# Patient Record
Sex: FEMALE | Race: WHITE | NOT HISPANIC OR LATINO | Employment: OTHER | URBAN - METROPOLITAN AREA
[De-identification: names, ages, dates, MRNs, and addresses within clinical notes are randomized per-mention and may not be internally consistent; named-entity substitution may affect disease eponyms.]

---

## 2017-01-16 ENCOUNTER — GENERIC CONVERSION - ENCOUNTER (OUTPATIENT)
Dept: OTHER | Facility: OTHER | Age: 62
End: 2017-01-16

## 2017-01-20 ENCOUNTER — ALLSCRIPTS OFFICE VISIT (OUTPATIENT)
Dept: OTHER | Facility: OTHER | Age: 62
End: 2017-01-20

## 2017-01-20 DIAGNOSIS — E78.5 HYPERLIPIDEMIA: ICD-10-CM

## 2017-01-20 DIAGNOSIS — K21.9 GASTRO-ESOPHAGEAL REFLUX DISEASE WITHOUT ESOPHAGITIS: ICD-10-CM

## 2017-01-20 DIAGNOSIS — M15.0 PRIMARY GENERALIZED (OSTEO)ARTHRITIS: ICD-10-CM

## 2017-01-20 DIAGNOSIS — Z51.81 ENCOUNTER FOR THERAPEUTIC DRUG LEVEL MONITORING: ICD-10-CM

## 2017-01-20 DIAGNOSIS — M48.50XA COLLAPSED VERTEBRA, NOT ELSEWHERE CLASSIFIED, SITE UNSPECIFIED, INITIAL ENCOUNTER FOR FRACTURE (HCC): ICD-10-CM

## 2017-01-20 DIAGNOSIS — M81.0 AGE-RELATED OSTEOPOROSIS WITHOUT CURRENT PATHOLOGICAL FRACTURE: ICD-10-CM

## 2017-01-23 ENCOUNTER — GENERIC CONVERSION - ENCOUNTER (OUTPATIENT)
Dept: OTHER | Facility: OTHER | Age: 62
End: 2017-01-23

## 2017-01-26 ENCOUNTER — HOSPITAL ENCOUNTER (OUTPATIENT)
Dept: RADIOLOGY | Facility: MEDICAL CENTER | Age: 62
Discharge: HOME | End: 2017-01-26
Payer: COMMERCIAL

## 2017-01-26 DIAGNOSIS — Z12.31 OTHER SCREENING MAMMOGRAM: ICD-10-CM

## 2017-01-26 PROCEDURE — G0202 SCR MAMMO BI INCL CAD: HCPCS

## 2017-01-27 ENCOUNTER — TRANSCRIBE ORDERS (OUTPATIENT)
Dept: ADMINISTRATIVE | Facility: HOSPITAL | Age: 62
End: 2017-01-27

## 2017-01-27 DIAGNOSIS — Z12.31 SCREENING MAMMOGRAM FOR HIGH-RISK PATIENT: Primary | ICD-10-CM

## 2017-03-06 ENCOUNTER — GENERIC CONVERSION - ENCOUNTER (OUTPATIENT)
Dept: OTHER | Facility: OTHER | Age: 62
End: 2017-03-06

## 2017-03-20 ENCOUNTER — GENERIC CONVERSION - ENCOUNTER (OUTPATIENT)
Dept: OTHER | Facility: OTHER | Age: 62
End: 2017-03-20

## 2017-03-27 ENCOUNTER — GENERIC CONVERSION - ENCOUNTER (OUTPATIENT)
Dept: OTHER | Facility: OTHER | Age: 62
End: 2017-03-27

## 2017-04-03 ENCOUNTER — GENERIC CONVERSION - ENCOUNTER (OUTPATIENT)
Dept: OTHER | Facility: OTHER | Age: 62
End: 2017-04-03

## 2017-05-01 ENCOUNTER — GENERIC CONVERSION - ENCOUNTER (OUTPATIENT)
Dept: OTHER | Facility: OTHER | Age: 62
End: 2017-05-01

## 2017-05-22 ENCOUNTER — GENERIC CONVERSION - ENCOUNTER (OUTPATIENT)
Dept: OTHER | Facility: OTHER | Age: 62
End: 2017-05-22

## 2017-06-12 ENCOUNTER — GENERIC CONVERSION - ENCOUNTER (OUTPATIENT)
Dept: OTHER | Facility: OTHER | Age: 62
End: 2017-06-12

## 2017-07-20 ENCOUNTER — ALLSCRIPTS OFFICE VISIT (OUTPATIENT)
Dept: OTHER | Facility: OTHER | Age: 62
End: 2017-07-20

## 2017-07-20 DIAGNOSIS — M15.0 PRIMARY GENERALIZED (OSTEO)ARTHRITIS: ICD-10-CM

## 2017-07-20 DIAGNOSIS — M81.0 AGE-RELATED OSTEOPOROSIS WITHOUT CURRENT PATHOLOGICAL FRACTURE: ICD-10-CM

## 2017-07-20 DIAGNOSIS — M48.50XA COLLAPSED VERTEBRA, NOT ELSEWHERE CLASSIFIED, SITE UNSPECIFIED, INITIAL ENCOUNTER FOR FRACTURE (HCC): ICD-10-CM

## 2017-07-24 ENCOUNTER — GENERIC CONVERSION - ENCOUNTER (OUTPATIENT)
Dept: OTHER | Facility: OTHER | Age: 62
End: 2017-07-24

## 2017-08-01 ENCOUNTER — HOSPITAL ENCOUNTER (OUTPATIENT)
Dept: RADIOLOGY | Facility: MEDICAL CENTER | Age: 62
Discharge: HOME | End: 2017-08-01
Payer: COMMERCIAL

## 2017-08-01 DIAGNOSIS — M81.0 AGE-RELATED OSTEOPOROSIS WITHOUT CURRENT PATHOLOGICAL FRACTURE: ICD-10-CM

## 2017-08-01 DIAGNOSIS — M48.50XA COLLAPSED VERTEBRA, NOT ELSEWHERE CLASSIFIED, SITE UNSPECIFIED, INITIAL ENCOUNTER FOR FRACTURE (HCC): ICD-10-CM

## 2017-08-01 DIAGNOSIS — M15.0 PRIMARY GENERALIZED (OSTEO)ARTHRITIS: ICD-10-CM

## 2017-08-01 PROCEDURE — 77080 DXA BONE DENSITY AXIAL: CPT

## 2017-08-10 ENCOUNTER — ALLSCRIPTS OFFICE VISIT (OUTPATIENT)
Dept: OTHER | Facility: OTHER | Age: 62
End: 2017-08-10

## 2017-08-17 ENCOUNTER — ALLSCRIPTS OFFICE VISIT (OUTPATIENT)
Dept: OTHER | Facility: OTHER | Age: 62
End: 2017-08-17

## 2017-08-17 PROCEDURE — 87624 HPV HI-RISK TYP POOLED RSLT: CPT | Performed by: FAMILY MEDICINE

## 2017-08-17 PROCEDURE — G0145 SCR C/V CYTO,THINLAYER,RESCR: HCPCS | Performed by: FAMILY MEDICINE

## 2017-08-19 ENCOUNTER — LAB REQUISITION (OUTPATIENT)
Dept: LAB | Facility: HOSPITAL | Age: 62
End: 2017-08-19
Payer: COMMERCIAL

## 2017-08-19 DIAGNOSIS — Z01.419 ENCOUNTER FOR GYNECOLOGICAL EXAMINATION WITHOUT ABNORMAL FINDING: ICD-10-CM

## 2017-08-23 LAB
HPV RRNA GENITAL QL NAA+PROBE: NORMAL
LAB AP GYN PRIMARY INTERPRETATION: NORMAL
Lab: NORMAL

## 2017-08-31 ENCOUNTER — ALLSCRIPTS OFFICE VISIT (OUTPATIENT)
Dept: OTHER | Facility: OTHER | Age: 62
End: 2017-08-31

## 2017-09-18 ENCOUNTER — GENERIC CONVERSION - ENCOUNTER (OUTPATIENT)
Dept: OTHER | Facility: OTHER | Age: 62
End: 2017-09-18

## 2017-11-20 ENCOUNTER — GENERIC CONVERSION - ENCOUNTER (OUTPATIENT)
Dept: OTHER | Facility: OTHER | Age: 62
End: 2017-11-20

## 2017-11-23 DIAGNOSIS — E55.9 VITAMIN D DEFICIENCY: ICD-10-CM

## 2017-11-23 DIAGNOSIS — E78.5 HYPERLIPIDEMIA: ICD-10-CM

## 2017-11-24 ENCOUNTER — GENERIC CONVERSION - ENCOUNTER (OUTPATIENT)
Dept: OTHER | Facility: OTHER | Age: 62
End: 2017-11-24

## 2017-11-25 LAB
25(OH)D3 SERPL-MCNC: 52 NG/ML (ref 30–100)
A/G RATIO (HISTORICAL): 1.6 (ref 1.2–2.2)
ALBUMIN SERPL BCP-MCNC: 4.1 G/DL (ref 3.6–4.8)
ALP SERPL-CCNC: 64 IU/L (ref 39–117)
ALT SERPL W P-5'-P-CCNC: 15 IU/L (ref 0–32)
AST SERPL W P-5'-P-CCNC: 18 IU/L (ref 0–40)
BASOPHILS # BLD AUTO: 0.1 X10E3/UL (ref 0–0.2)
BASOPHILS # BLD AUTO: 2 %
BILIRUB SERPL-MCNC: 0.3 MG/DL (ref 0–1.2)
BUN SERPL-MCNC: 15 MG/DL (ref 8–27)
BUN/CREA RATIO (HISTORICAL): 16 (ref 12–28)
CALCIUM SERPL-MCNC: 9.5 MG/DL (ref 8.7–10.3)
CHLORIDE SERPL-SCNC: 104 MMOL/L (ref 96–106)
CHOLEST SERPL-MCNC: 201 MG/DL (ref 100–199)
CO2 SERPL-SCNC: 24 MMOL/L (ref 18–29)
CREAT SERPL-MCNC: 0.96 MG/DL (ref 0.57–1)
DEPRECATED RDW RBC AUTO: 13.4 % (ref 12.3–15.4)
EGFR AFRICAN AMERICAN (HISTORICAL): 73 ML/MIN/1.73
EGFR-AMERICAN CALC (HISTORICAL): 63 ML/MIN/1.73
EOSINOPHIL # BLD AUTO: 0.5 X10E3/UL (ref 0–0.4)
EOSINOPHIL # BLD AUTO: 7 %
GLUCOSE SERPL-MCNC: 87 MG/DL (ref 65–99)
HCT VFR BLD AUTO: 39 % (ref 34–46.6)
HDLC SERPL-MCNC: 53 MG/DL
HGB BLD-MCNC: 12.6 G/DL (ref 11.1–15.9)
IMM.GRANULOCYTES (CD4/8) (HISTORICAL): 0 %
IMM.GRANULOCYTES (CD4/8) (HISTORICAL): 0 X10E3/UL (ref 0–0.1)
LDLC SERPL CALC-MCNC: 124 MG/DL (ref 0–99)
LYMPHOCYTES # BLD AUTO: 2.5 X10E3/UL (ref 0.7–3.1)
LYMPHOCYTES # BLD AUTO: 32 %
MCH RBC QN AUTO: 29.3 PG (ref 26.6–33)
MCHC RBC AUTO-ENTMCNC: 32.3 G/DL (ref 31.5–35.7)
MCV RBC AUTO: 91 FL (ref 79–97)
MONOCYTES # BLD AUTO: 0.9 X10E3/UL (ref 0.1–0.9)
MONOCYTES (HISTORICAL): 12 %
NEUTROPHILS # BLD AUTO: 3.8 X10E3/UL (ref 1.4–7)
NEUTROPHILS # BLD AUTO: 47 %
PLATELET # BLD AUTO: 458 X10E3/UL (ref 150–379)
POTASSIUM SERPL-SCNC: 4.9 MMOL/L (ref 3.5–5.2)
RBC (HISTORICAL): 4.3 X10E6/UL (ref 3.77–5.28)
SODIUM SERPL-SCNC: 140 MMOL/L (ref 134–144)
TOT. GLOBULIN, SERUM (HISTORICAL): 2.5 G/DL (ref 1.5–4.5)
TOTAL PROTEIN (HISTORICAL): 6.6 G/DL (ref 6–8.5)
TRIGL SERPL-MCNC: 120 MG/DL (ref 0–149)
TSH SERPL DL<=0.05 MIU/L-ACNC: 2.64 UIU/ML (ref 0.45–4.5)
VLDLC SERPL CALC-MCNC: 24 MG/DL (ref 5–40)
WBC # BLD AUTO: 7.9 X10E3/UL (ref 3.4–10.8)

## 2017-12-16 ENCOUNTER — OFFICE VISIT (OUTPATIENT)
Dept: URGENT CARE | Facility: MEDICAL CENTER | Age: 62
End: 2017-12-16
Payer: COMMERCIAL

## 2017-12-16 PROCEDURE — 99213 OFFICE O/P EST LOW 20 MIN: CPT

## 2017-12-19 NOTE — PROGRESS NOTES
Assessment  1  Acute URI (465 9) (J06 9)    Plan  Acute URI    · Bromfed DM 30-2-10 MG/5ML Oral Syrup; TAKE 10 ML Every 6 hours    Discussion/Summary  Discussion Summary:   Take bromfed as directed at bedtimeTake claritin as directed during the dayIncrease fluid intakeUse humidifier in bedroom  Medication Side Effects Reviewed: Possible side effects of new medications were reviewed with the patient/guardian today  Understands and agrees with treatment plan: The treatment plan was reviewed with the patient/guardian  The patient/guardian understands and agrees with the treatment plan   Counseling Documentation With Imm: The patient was counseled regarding diagnostic results,-- instructions for management,-- risk factor reductions,-- prognosis,-- patient and family education,-- risks and benefits of treatment options,-- importance of compliance with treatment  Follow Up Instructions: Follow Up with your Primary Care Provider in 1-2 days  If your symptoms worsen, go to the nearest [place] [address] Emergency Department  Chief Complaint  1  Cough  Chief Complaint Free Text Note Form: Pt with several week history of cough and cold symptom  No fever      History of Present Illness  Hospital Based Practices Required Assessment:  Pain Assessment  the patient states they have pain  The pain is located in the chest  The patient describes the pain as tightness  (on a scale of 0 to 10, the patient rates the pain at 5 )   Prefered Language is  english  Primary Language is  english  Readiness To Learn: Receptive  Barriers To Learning: none  Preferred Learning: verbal   Upper Respiratory Infection (Brief): The patient is being seen for an initial evaluation of this episode of an upper respiratory infection    Symptoms: sneezing,-- nasal congestion,-- runny nose,-- scratchy throat,-- productive cough,-- colored sputum-- and-- facial pressure, but-- no sore throat,-- no dry cough,-- no clear sputum,-- no facial pain-- and-- no hoarseness  Review of Systems  Focused-Female:  Constitutional: No fever, no chills, feels well, no tiredness, no recent weight gain or loss  ENT: as noted in HPI  Cardiovascular: no complaints of slow or fast heart rate, no chest pain, no palpitations, no leg claudication or lower extremity edema  Respiratory: no complaints of shortness of breath, no wheezing, no dyspnea on exertion, no orthopnea or PND  Breasts: no complaints of breast pain, breast lump or nipple discharge  Gastrointestinal: no complaints of abdominal pain, no constipation, no nausea or diarrhea, no vomiting, no bloody stools  Genitourinary: no complaints of dysuria, no incontinence, no pelvic pain, no dysmenorrhea, no vaginal discharge or abnormal vaginal bleeding  Musculoskeletal: no complaints of arthralgia, no myalgia, no joint swelling or stiffness, no limb pain or swelling  Integumentary: no complaints of skin rash or lesion, no itching or dry skin, no skin wounds  Neurological: no complaints of headache, no confusion, no numbness or tingling, no dizziness or fainting  Active Problems  1  Abnormal blood chemistry (790 6) (R79 9)   2  Acute exacerbation of chronic low back pain (724 2,338 19,338 29) (M54 5,G89 29)   3  Acute lumbar back pain (724 2) (M54 5)   4  Acute rhinitis (460) (J00)   5  Adjustment disorder with anxiety (309 24) (F43 22)   6  Backache (724 5) (M54 9)   7  Bilateral ovarian cysts (620 2) (N83 201,N83 202)   8  Bronchitis (490) (J40)   9  Calf pain (729 5) (M79 669)   10  Compression fracture of spine (805 8) (M48 50XA)   11  Degenerative lumbar disc (722 52) (M51 36)   12  Dysfunction of Eustachian tube, unspecified laterality (381 81) (H69 80)   13  Encounter for monitoring teriparatide therapy (V58 83,V58 69) (Z51 81,Z79 899)   14  Esophageal reflux (530 81) (K21 9)   15  Flu vaccine need (V04 81) (Z23)   16  Hyperlipidemia (272 4) (E78 5)   17   Insomnia (780 52) (G47 00)   18  Jaw pain (784 92) (R68 84)   19  Joint pain (719 40) (M25 50)   20  Knee injury, right, initial encounter (959 7) (S89 91XA)   21  Lumbar arthropathy (721 3) (M46 96)   22  Mammogram abnormal (793 80) (R92 8)   23  Need for shingles vaccine (V04 89) (Z23)   24  Osteoporosis (733 00) (M81 0)   25  Other acute sinusitis (461 8) (J01 80)   26  Primary generalized (osteo)arthritis (715 09) (M15 0)   27  Sacroiliitis (720 2) (M46 1)   28  Thrombocytosis (238 71) (D47 3)   29  Trochanteric bursitis of right hip (726 5) (M70 61)   30  Vaginitis, atrophic (627 3) (N95 2)   31  Visit for routine gyn exam (V72 31) (Z01 419)   32  Vitamin D deficiency (268 9) (E55 9)   33  Weight loss, non-intentional (783 21) (R63 4)    Past Medical History  1  History of Age At First Period 14 Years Old (Menarche)   2  History of Age At First Pregnancy 28 Years Old   3  History of Arthritis (V13 4)   4  History of ASCUS favor benign (796 9)   5  History of Breast cancer screening (V76 10) (Z12 39)   6  History of Colonoscopy (Fiberoptic)   7  History of Encounter for screening colonoscopy (V76 51) (Z12 11)   8  History of Encounter for screening mammogram for malignant neoplasm of breast (V76 12) (Z12 31)   9  History Of 2  Previous Pregnancies (V61 5)   10  History of gastroesophageal reflux (GERD) (V12 79) (Z87 19)   11  History of Menopause (627 2) (Z78 0)   12  History of Ovarian cyst (620 2) (N83 20)   13  History of Postmenopausal bleeding (627 1) (N95 0)   14  History of Previous Pregnancies Resulted In 2  Live Birth(S)   15  History of Rheumatic fever (390) (I00)  Active Problems And Past Medical History Reviewed: The active problems and past medical history were reviewed and updated today  Family History  Mother    1  Family history of Arthritis (V17 7)   2  Family history of Diabetes Mellitus (V18 0)   3  Family history of Family Health Status Of Mother - Alive   4  Family history of osteoporosis (V17 81) (Z82 62)  Father    5  Family history of Diabetes Mellitus (V18 0)   6  Family history of Family Health Status Of Father - Alive   7  Family history of Heart Disease (V17 49)   8  Family history of Prostate Cancer (V16 42)  Family History    9  Denied: Family history of colitis   10  Denied: Family history of Crohn's disease   6  Denied: Family history of psoriasis   12  Denied: Family history of rheumatoid arthritis   13  Denied: Family history of systemic lupus erythematosus  Family History Reviewed: The family history was reviewed and updated today  Social History   · Denied: History of Being A Social Drinker   · Caffeine Use   · Never a smoker   · Occasional alcohol use   · Denied: History of Tobacco Use   · Two children  Social History Reviewed: The social history was reviewed and updated today  Surgical History  1  History of Arthrodesis Thumb Carpometacarpal Joint   2  History of Diagnostic Esophagogastroduodenoscopy   3  History of Dilation And Curettage Of Cervical Stump   4  History of Ear Surgery   5  History of Endometrial Biopsy By Suction   6  History of Knee Arthroscopy With Medial Meniscectomy   7  History of Knee Surgery   8  History of Tubal Ligation  Surgical History Reviewed: The surgical history was reviewed and updated today  Current Meds   1  Calcium 600 MG Oral Tablet; TAKE 1 TABLET DAILY; Therapy: 45Nrk2500 to (Evaluate:75Nxc4980); Last Rx:55Qxb7606 Ordered   2  GNP Calcium 600 +D TABS; Take 1 tablet daily; Therapy: (Recorded:04Jun2015) to Recorded   3  Ibuprofen 200 MG Oral Tablet; TAKE 2 TABLET Daily PRN pain; Therapy: (0660 303 88 06) to Recorded   4  Omeprazole 40 MG Oral Capsule Delayed Release; Take 1 capsule by mouth  daily; Therapy: 79Dhf6489 to (Evaluate:38Xmi5274)  Requested for: 15FQC3108; Last Rx:14Nov2017 Ordered   5  Premarin 0 625 MG/GM Vaginal Cream; INSERT 1 GRAM INTRAVAGINALLY  DAILY;  Therapy: 21Apr2011 to (Kane Watkins)  Requested for: 62GUN5252; Last Rx:24Hdx8731 Ordered   6  Vitamin D 2000 UNIT Oral Tablet; Take 1 tablet daily; Therapy: (Recorded:86Mky8430) to Recorded  Medication List Reviewed: The medication list was reviewed and updated today  Allergies  1  Penicillins    Vitals  Signs   Recorded: 09PBN1492 01:27PM   Temperature: 98 4 F  Heart Rate: 85  Respiration: 18  Systolic: 008  Diastolic: 86  O2 Saturation: 100  Pain Scale: 5    Physical Exam   Constitutional  General appearance: No acute distress, well appearing and well nourished  Eyes  Conjunctiva and lids: No swelling, erythema or discharge  Pupils and irises: Equal, round and reactive to light  Ears, Nose, Mouth, and Throat  External inspection of ears and nose: Normal    Otoscopic examination: Tympanic membranes translucent with normal light reflex  Canals patent without erythema  Nasal mucosa, septum, and turbinates: Normal without edema or erythema  Oropharynx: Abnormal  -- Mild erythema posterior pharynx, +PND  Pulmonary  Respiratory effort: No increased work of breathing or signs of respiratory distress  Auscultation of lungs: Clear to auscultation  Cardiovascular  Palpation of heart: Normal PMI, no thrills  Auscultation of heart: Normal rate and rhythm, normal S1 and S2, without murmurs     Examination of extremities for edema and/or varicosities: Normal    Psychiatric  Orientation to person, place, and time: Normal    Mood and affect: Normal        Future Appointments    Date/Time Provider Specialty Site   02/28/2018 03:00 PM Zuleika Leung, [facility] Rheumatology [address]   Electronically signed by : Manpreet Fontanez [facility]; Dec 16 2017  1:44PM EST                       (Author)    Electronically signed by : NIDA Barber ; Dec 18 2017  3:36PM EST                       (Co-author)

## 2018-01-13 VITALS
HEIGHT: 65 IN | RESPIRATION RATE: 16 BRPM | WEIGHT: 132.38 LBS | SYSTOLIC BLOOD PRESSURE: 118 MMHG | BODY MASS INDEX: 22.06 KG/M2 | DIASTOLIC BLOOD PRESSURE: 72 MMHG | HEART RATE: 90 BPM

## 2018-01-13 NOTE — PROGRESS NOTES
Assessment    1  Osteoporosis (733 00) (M81 0)   2  Compression fracture of spine (805 8) (M48 50XA)   3  Primary generalized (osteo)arthritis (715 09) (M15 0)   4  Encounter for monitoring teriparatide therapy (V58 83,V58 69) (Z51 81,Z79 899)   5  Esophageal reflux (530 81) (K21 9)   6  Hyperlipidemia (272 4) (E78 5)    Plan    1  Calcium 600 MG Oral Tablet; TAKE 1 TABLET DAILY    2  (1) CBC/PLT/DIFF; Status:Active; Requested for:22Rka5631;    3  (1) COMPREHENSIVE METABOLIC PANEL; Status:Active; Requested for:69Jhx3900;    4  (1) PTH N-TERMINAL (INTACT); Status:Active; Requested for:66Bej2917;    5  (1) TSH; Status:Active; Requested for:94Rsg9305;    6  (1) VITAMIN D 25-HYDROXY; Status:Active; Requested for:45Far7309;    7  Follow-up visit in 6 months Evaluation and Treatment  Follow-up  Status: Complete    Done: 36TEP9846    8  Forteo 600 MCG/2 4ML Subcutaneous Solution; Inject subcutaneously 20mcg   (0 08ml) every day (Discard 28 days afterfirst use)    9  Call [phone] if: The pain seems worse ; Status:Complete;   Done: 67ZAJ3526   10  Call [phone] if: You have questions or concerns about your problem ;    Status:Complete;   Done: 78HWT7149   11  Call [phone] if: Your ankle swelling is getting worse ; Status:Complete;   Done:    36PFB2375    12  BD Pen Needle Short U/F 31G X 8 MM Miscellaneous; Use 1 needle daily as    direcdted to inject Forteo    Discussion/Summary    This is a 16-year-old female presenting today for follow-up for osteoporosis  Patient states that she's been doing pretty good since last appointment  She did undergo right knee meniscal repair with a physician out of [facility]  She states that the surgery went well however she is currently having right knee discomfort and is receiving cortisone injections for this  She states that the injections do provide relief however will need a knee replacement eventually   The patient states that she has no other joint pain at this time however does notice swelling in both ankles mostly related to the warmer weather  She has no morning stiffness and denies any difficulty with sleep  She occasionally has nonrestorative sleep  She's been injecting Forteo daily for the last year and continues to utilize vitamin D  The patient states that she has stopped calcium however and is uncertain why  In addition she is active and also completed physical therapy for her knee as well  On physical exam, patient does not have any synovitis  She does have tenderness with palpation of the lumbar spine as well as bilateral greater trochanteric bursae  She also experiences some discomfort with palpation of the right CMC  Patient does have some restriction of the right knee with range of motion secondary to pain as well as crepitus in bilateral knees  Patient's labs including a CBC, CMP, TSH, and parathyroid hormone were all within normal range  At this time patient's history and physical is most consistent with osteoporosis which appears to be stable at this time  Patient will continue with Forteo injections daily  Patient will also continue with vitamin D and was encouraged to restart her calcium supplement  In addition she will remain active and continue with exercise on a routine basis  Patient will continue to follow-up with her orthopedic for further cortisone injections into the right knee  We will see patient back in 6 months time and we'll repeat a CBC, CMP, TSH, PTH, and vitamin D level  Patient will call in the interim if she has any further questions or concerns  The patient, patient's family was counseled regarding diagnostic results, instructions for management, prognosis, patient and family education, risks and benefits of treatment options, importance of compliance with treatment  Chief Complaint  F/U OP   Patient is here today for follow up of chronic conditions described in HPI        History of Present Illness  Patient is in the office today for follow up for OP  Feeling pretty good  Right knee surgery with Dr Lydia Delatorre for torn meniscus  Went well but will need a knee replacement, receiving cortisone injections at this time  injections are helping  No other joint pain  Swelling in both ankles with the heat more so  No AM stiffness  No difficulty sleep  +Occasional non restorative sleep  No fatigue  Still do Forteo x 1 year  Vitamin D 2000 IU  Not taking Ca at this time  Activity not consistent basis and did PT for knee  RAPID3: 4 7 /30      Review of Systems    Constitutional: no fever, no recent weight gain, fatigue, no chills, no recent weight loss and no anorexia  HEENT: no blurred vision, no double vision, no amaurosis fugax, no dryness of the eyes, no eye pain, no erythema eye(s), no dryness mouth, no mouth sores, not feeling congested and no sore throat  Cardiovascular: swelling in the arms or legs, but no chest pain or pressure and no dyspnea on exertion  Respiratory: no unusual or persistent cough, no shortness of breath and no pleurisy  Gastrointestinal: no abdominal pain, no nausea, no vomiting, no heartburn, no diarrhea, no constipation, no melena and no BRBPR  Genitourinary: No foamy urine, but no dysuria and no hematuria  Musculoskeletal: as noted in HPI  Integumentary no rash, no Raynaud's, no alopecia, no nail changes and no photosensitivity  Endocrine no polyuria and no polydipsia  Hematologic/Lymphatic: no unusual bleeding and no tendency for easy bruising  Neurological: no headache, no vertigo or dizziness, no tingling and no weakness  Psychiatric: no insomnia and no non-restorative sleep  Active Problems    1  Abnormal blood chemistry (790 6) (R79 9)   2  Acute exacerbation of chronic low back pain (724 2,338 19,338 29) (M54 5,G89 29)   3  Acute lumbar back pain (724 2) (M54 5)   4  Acute rhinitis (460) (J00)   5  Adjustment disorder with anxiety (309 24) (F43 22)   6   Backache (724 5) (M54 9) 7  Bilateral ovarian cysts (620 2) (N83 20)   8  Bronchitis (490) (J40)   9  Calf pain (729 5) (M79 669)   10  Compression fracture of spine (805 8) (M48 50XA)   11  Degenerative lumbar disc (722 52) (M51 36)   12  Dysfunction of eustachian tube, unspecified laterality (381 81) (H69 80)   13  Encounter for monitoring teriparatide therapy (V58 83,V58 69) (Z51 81,Z79 899)   14  Esophageal reflux (530 81) (K21 9)   15  Hyperlipidemia (272 4) (E78 5)   16  Insomnia (780 52) (G47 00)   17  Jaw pain (784 92) (R68 84)   18  Joint pain (719 40) (M25 50)   19  Knee injury, right, initial encounter (959 7) (S89 91XA)   20  Lumbar arthropathy (721 3) (M46 96)   21  Mammogram abnormal (793 80) (R92 8)   22  Osteoporosis (733 00) (M81 0)   23  Other acute sinusitis (461 8) (J01 80)   24  Primary generalized (osteo)arthritis (715 09) (M15 0)   25  Sacroiliitis (720 2) (M46 1)   26  Thrombocytosis (238 71) (D47 3)   27  Vaginitis, atrophic (627 3) (N95 2)   28  Visit for routine gyn exam (V72 31) (Z01 419)   29  Weight loss, non-intentional (783 21) (R63 4)    Past Medical History    1  History of Age At First Period 14 Years Old (Menarche)   2  History of Age At First Pregnancy 28 Years Old   3  History of Arthritis (V13 4)   4  History of ASCUS favor benign (796 9)   5  History of Colonoscopy (Fiberoptic)   6  History of Encounter for screening colonoscopy (V76 51) (Z12 11)   7  History of Encounter for screening mammogram for malignant neoplasm of breast   (V76 12) (Z12 31)   8  History Of 2  Previous Pregnancies (V61 5)   9  History of gastroesophageal reflux (GERD) (V12 79) (Z87 19)   10  History of Menopause (627 2) (Z78 0)   11  History of Ovarian cyst (620 2) (N83 20)   12  History of Postmenopausal bleeding (627 1) (N95 0)   13  History of Previous Pregnancies Resulted In 2  Live Birth(S)   14  History of Rheumatic fever (390) (I00)    The active problems and past medical history were reviewed and updated today  Surgical History    1  History of Arthrodesis Thumb Carpometacarpal Joint   2  History of Diagnostic Esophagogastroduodenoscopy   3  History of Dilation And Curettage Of Cervical Stump   4  History of Ear Surgery   5  History of Endometrial Biopsy By Suction   6  History of Knee Arthroscopy With Medial Meniscectomy   7  History of Knee Surgery   8  History of Tubal Ligation    The surgical history was reviewed and updated today  Family History  Mother    1  Family history of Arthritis (V17 7)   2  Family history of Diabetes Mellitus (V18 0)   3  Family history of Family Health Status Of Mother - Alive   4  Family history of osteoporosis (V17 81) (Z82 62)  Father    5  Family history of Diabetes Mellitus (V18 0)   6  Family history of Family Health Status Of Father - Alive   7  Family history of Heart Disease (V17 49)   8  Family history of Prostate Cancer (V16 42)  Family History    9  Denied: Family history of colitis   10  Denied: Family history of Crohn's disease   6  Denied: Family history of psoriasis   12  Denied: Family history of rheumatoid arthritis   13  Denied: Family history of systemic lupus erythematosus    The family history was reviewed and updated today  Social History    · Denied: History of Being A Social Drinker   · Caffeine Use   · Never A Smoker   · Occasional alcohol use   · Denied: History of Tobacco Use   · Two children  The social history was reviewed and updated today  The social history was reviewed and is unchanged  Current Meds   1  BD Pen Needle Short U/F 31G X 8 MM Miscellaneous; Use 1 needle daily as direcdted to   inject Forteo; Therapy: 11KTQ0319 to (Velia Lat)  Requested for: 78NMY3049; Last   Rx:06Jan2016 Ordered   2  Forteo 600 MCG/2 4ML Subcutaneous Solution; Inject subcutaneously 20mcg (0 08ml)   every day (Discard 28 days afterfirst use); Therapy: 34EUY6185 to (Shilpa Fruit)  Requested for: 18Apr2016; Last   Rx:07Apr2016 Ordered   3   [address] Calcium 600 +D TABS; Take 1 tablet daily; Therapy: (Recorded:2015) to Recorded   4  Ibuprofen 200 MG Oral Tablet; TAKE 2 TABLET Daily PRN pain; Therapy: (Rodo Aranda) to Recorded   5  OxyCODONE HCl - 5 MG Oral Tablet; TAKE 1 TABLET EVERY 4 HOURS AS NEEDED   FOR PAIN;   Therapy: (Recorded:2016) to Recorded   6  Premarin 0 625 MG/GM Vaginal Cream; INSERT 1 GRAM INTRAVAGINALLY  DAILY; Therapy: 2011 to (Last P57LVU5842)  Requested for: 43PJC7090 Ordered   7  PriLOSEC 40 MG Oral Capsule Delayed Release; TAKE 1 CAPSULE DAILY; Therapy: (Recorded:2015) to Recorded   8  Vitamin D 2000 UNIT Oral Tablet; Take 1 tablet daily; Therapy: (Recorded:42Vjy1806) to Recorded    The medication list was reviewed and updated today  Immunizations  Tdap --- [place]: 02-May-2013     Allergies    1  Penicillins    Vitals  Signs   Recorded: 64ARJ4243 68:51AG   Systolic: 751  Diastolic: 82  Heart Rate: 80  Weight: 136 lb   BMI Calculated: 22 63  BSA Calculated: 1 68    Physical Exam    Constitutional   General appearance: No acute distress, well appearing and well nourished  Eyes   Conjunctiva and lids: No swelling, erythema or discharge  Pupils and irises: Equal, round and reactive to light  Ears, Nose, Mouth, and Throat   External inspection of ears and nose: Normal     Oropharynx: Normal with no erythema, edema, exudate lesions, or ulcers  Pulmonary   Respiratory effort: No increased work of breathing or signs of respiratory distress  Auscultation of lungs: Clear to auscultation  Cardiovascular   Auscultation of heart: Normal rate and rhythm, normal S1 and S2, without murmurs  Examination of extremities for edema and/or varicosities: Normal     Lymphatic   Palpation of lymph nodes in neck: No lymphadenopathy  Psychiatric   Orientation to person, place, and time: Normal     Mood and affect: Normal       Right thumb CMC tenderness  Right knee restricted ROM     Right hip tenderness  Left hip tenderness  Right Upper Extremity: Normal ROM  Right Hand: Right Hand Appearance: Blair's node at the all PIPs digit and Heberden's nodule at the all DIPs digit  Right Wrist: Right Elbow: Right Shoulder:   Left Upper Extremity: Normal ROM  Left Hand: Appearance: all PIPs PIP Blair's node(s) and all DIPs digit(s) Heberden's Node(s)  Left Wrist: Left Elbow: Left Shoulder:   Right Lower Extremity: Abnormal ROM  +Discomfort with extension of right knee  Left Lower Extremity: Normal examination  Normal ROM  Musculoskeletal - Joints, bones, and muscles: Abnormal  Palpation - lower mid-lumbar tenderness and bilateral ankle crepitus  Health Management  History of Encounter for screening colonoscopy   COLONOSCOPY; every 10 years; Last 52CQJ0838; Next Due: 24GAH1633; Active    Attending Note  Collaborating Physician: I did not interview and examine the patient, I discussed the case with the Advanced Practitioner and reviewed the note and I agree with the Advanced Practitioner note  Future Appointments    Date/Time Provider Specialty Site   08/04/2016 06:30 PM TAHMINA Bernard   [address] [facility] [employer]   01/20/2017 03:20 PM YAA Bey [facility]umatology [facility] [address] [facility]     Signatures   Electronically signed by : YAA Castellon; Jul 22 2016  3:49PM EST                       (Author)    Electronically signed by : Jessica Aquino DO; Jul 22 2016  3:56PM EST                       (Author)

## 2018-01-13 NOTE — PROGRESS NOTES
Assessment    1  Never a smoker   2  Encounter for preventive health examination (V70 0) (Z00 00)   3  Hyperlipidemia (272 4) (E78 5)   4  Bilateral ovarian cysts (620 2) (N83 20)   5  Osteoporosis (733 00) (M81 0)   6  Visit for routine gyn exam (V72 31) (Z01 419)    Plan  Visit for routine gyn exam    · (LC) Pap Lb, rfx HPV ASCU; Status:Active - Retrospective By Protocol Authorization; Requested for:[id number]; Check pelvic US, mammogram, BW  Rx for Zostavax  Discussion/Summary    1  -updated immunizations  Mammogram, BW, colonoscopy up to date  2  History of ovarian cysts-annual US was advised  3  Osteoporosis-per rheum; on Forteo  4  Routine Gyn exam  5  Dyspareunia-does well with Premarin cream       History of Present Illness  , Adult Female: The patient is being seen for a gynecology evaluation  General Health:   Lifestyle:  She consumes a diverse and healthy diet  (Eats a lot of fruits and vegeatbles, whole grains  Dietary calcium several daily  Not much caffeine  )   She does not have any weight concerns  She exercises regularly  [facility] daily with the dog and swims  Goes about 30 minutes  Has trouble with exercise due to her knee pain  )   She does not use tobacco  She consumes alcohol  She reports occasional alcohol use  Screening:   HPI: Ganesh Carmona had knee surgery and is getting hydrocortisone shots  Will probably need a knee replacement   notices her head shakes a little bit  No hand tremor  Father was very shaky  Review of Systems    Genitourinary: Ues Premarin cream 1 gram twice a week  Helps pain with intercourse , but no dysuria, no pelvic pain, no incontinence and no unexplained vaginal bleeding  Over the past 2 weeks, how often have you been bothered by the following problems? 1 ) Little interest or pleasure in doing things? Not at all    2 ) Feeling down, depressed or hopeless? Not at all    3 ) Trouble falling asleep or sleeping too much?  Not at all    4 ) Feeling tired or having little energy? Not at all    5 ) Poor appetite or overeating? Not at all    6 ) Feeling bad about yourself, or that you are a failure, or have let yourself or your family down? Not at all    7 ) Trouble concentrating on things, such as reading a newspaper or watching television? Not at all    8 ) Moving or speaking so slowly that other people could have noticed, or the opposite, moving or speaking faster than usual? Not at all    9 ) Thoughts that you would be better off dead or of hurting yourself in some way? Not at all  Score 0      Active Problems    1  Abnormal blood chemistry (790 6) (R79 9)   2  Acute exacerbation of chronic low back pain (724 2,338 19,338 29) (M54 5,G89 29)   3  Acute lumbar back pain (724 2) (M54 5)   4  Acute rhinitis (460) (J00)   5  Adjustment disorder with anxiety (309 24) (F43 22)   6  Backache (724 5) (M54 9)   7  Bilateral ovarian cysts (620 2) (N83 20)   8  Bronchitis (490) (J40)   9  Calf pain (729 5) (M79 669)   10  Compression fracture of spine (805 8) (M48 50XA)   11  Degenerative lumbar disc (722 52) (M51 36)   12  Dysfunction of eustachian tube, unspecified laterality (381 81) (H69 80)   13  Encounter for monitoring teriparatide therapy (V58 83,V58 69) (Z51 81,Z79 899)   14  Esophageal reflux (530 81) (K21 9)   15  Hyperlipidemia (272 4) (E78 5)   16  Insomnia (780 52) (G47 00)   17  Jaw pain (784 92) (R68 84)   18  Joint pain (719 40) (M25 50)   19  Knee injury, right, initial encounter (959 7) (S89 91XA)   20  Lumbar arthropathy (721 3) (M46 96)   21  Mammogram abnormal (793 80) (R92 8)   22  Osteoporosis (733 00) (M81 0)   23  Other acute sinusitis (461 8) (J01 80)   24  Primary generalized (osteo)arthritis (715 09) (M15 0)   25  Sacroiliitis (720 2) (M46 1)   26  Thrombocytosis (238 71) (D47 3)   27  Vaginitis, atrophic (627 3) (N95 2)   28  Visit for routine gyn exam (V72 31) (Z01 419)   29   Weight loss, non-intentional (783 21) (R63 4)    Past Medical History    · History of Age At First Period 14 Years Old (Menarche)   · History of Age At First Pregnancy 28 Years Old   · History of Arthritis (V13 4)   · History of ASCUS favor benign (796 9)   · History of Colonoscopy (Fiberoptic)   · History of Encounter for screening colonoscopy (V76 51) (Z12 11)   · History of Encounter for screening mammogram for malignant neoplasm of breast  (V76 12) (Z12 31)   · History Of 2  Previous Pregnancies (V61 5)   · History of gastroesophageal reflux (GERD) (V12 79) (Z87 19)   · History of Menopause (627 2) (Z78 0)   · History of Ovarian cyst (620 2) (N83 20)   · History of Postmenopausal bleeding (627 1) (N95 0)   · History of Previous Pregnancies Resulted In 2  Live Birth(S)   · History of Rheumatic fever (390) (I00)    Surgical History    · History of Arthrodesis Thumb Carpometacarpal Joint   · History of Diagnostic Esophagogastroduodenoscopy   · History of Dilation And Curettage Of Cervical Stump   · History of Ear Surgery   · History of Endometrial Biopsy By Suction   · History of Knee Arthroscopy With Medial Meniscectomy   · History of Knee Surgery   · History of Tubal Ligation    Family History  Mother    · Family history of Arthritis (V17 7)   · Family history of Diabetes Mellitus (V18 0)   · Family history of Family Health Status Of Mother - Alive   · Family history of osteoporosis (V17 81) (Z80 61)  Father    · Family history of Diabetes Mellitus (V18 0)   · Family history of Family Health Status Of Father - Alive   · Family history of Heart Disease (V17 49)   · Family history of Prostate Cancer (V16 42)  Family History    · Denied: Family history of colitis   · Denied: Family history of Crohn's disease   · Denied: Family history of psoriasis   · Denied: Family history of rheumatoid arthritis   · Denied: Family history of systemic lupus erythematosus    Social History    · Denied: History of Being A Social Drinker   · Caffeine Use   · Never a smoker   · Occasional alcohol use   · 2 drinks/month   · Denied: History of Tobacco Use   · Two children    Current Meds   1  BD Pen Needle Short U/F 31G X 8 MM Miscellaneous; Use 1 needle daily as direcdted to   inject Forteo; Therapy: 71GRK7219 to (Josie Maurer)  Requested for: 44Kmo4254; Last   Rx:06Jan2016 Ordered   2  Calcium 600 MG Oral Tablet; TAKE 1 TABLET DAILY; Therapy: 22Jul2016 to (Evaluate:16Fky3588); Last Rx:22Jul2016 Ordered   3  Forteo 600 MCG/2 4ML Subcutaneous Solution; Inject subcutaneously 20mcg (0 08ml)   every day (Discard 28 days afterfirst use); Therapy: 32MCV4576 to (Jun Brooke)  Requested for: 22Jul2016; Last   Rx:07Apr2016 Ordered   4  GNP Calcium 600 +D TABS; Take 1 tablet daily; Therapy: (Recorded:04Jun2015) to Recorded   5  Ibuprofen 200 MG Oral Tablet; TAKE 2 TABLET Daily PRN pain; Therapy: (Leonardo Vital) to Recorded   6  OxyCODONE HCl - 5 MG Oral Tablet; TAKE 1 TABLET EVERY 4 HOURS AS NEEDED   FOR PAIN;   Therapy: (Recorded:22Jan2016) to Recorded   7  Premarin 0 625 MG/GM Vaginal Cream; INSERT 1 GRAM INTRAVAGINALLY  DAILY; Therapy: 21Apr2011 to (Last RD:42VTT7864)  Requested for: 46GAA2489 Ordered   8  PriLOSEC 40 MG Oral Capsule Delayed Release; TAKE 1 CAPSULE DAILY; Therapy: (Recorded:04Jun2015) to Recorded   9  Vitamin D 2000 UNIT Oral Tablet; Take 1 tablet daily; Therapy: (Recorded:37Gkw7806) to Recorded    Allergies    1  Penicillins    Vitals   Recorded: 79TNA5225 70:40KQ   Systolic 663   Diastolic 60   Heart Rate 80   Respiration 18   LMP May-1999   Height 5 ft 5 8 in   Weight 139 lb 4 00 oz   BMI Calculated 22 61   BSA Calculated 1 71     Physical Exam    Constitutional   General appearance: No acute distress, well appearing and well nourished  Neck   Neck: Supple, symmetric, trachea midline, no masses  Thyroid: Normal, no thyromegaly  Pulmonary   Respiratory effort: No increased work of breathing or signs of respiratory distress      Auscultation of lungs: Clear to auscultation  Cardiovascular   Auscultation of heart: Normal rate and rhythm, normal S1 and S2, no murmurs  Carotid pulses: 2+ bilaterally  Abdominal aorta: Normal     Femoral pulses: 2+ bilaterally  Pedal pulses: 2+ bilaterally  Peripheral vascular exam: Normal     Examination of extremities for edema and/or varicosities: Normal     Genitourinary   External genitalia and vagina: Normal, no lesions appreciated  Urethra: Normal, no discharge  Cervix: Normal, no lesions  Uterus: Normal size, no tenderness, no masses  Adnexa/Parametria: Normal, no masses or tenderness  Lymphatic   Palpation of lymph nodes in neck: No lymphadenopathy  Palpation of lymph nodes in groin: No lymphadenopathy  Musculoskeletal   Gait and station: Normal     Psychiatric   Mood and affect: Normal        Health Management  History of Encounter for screening colonoscopy   COLONOSCOPY; every 10 years; Last 10LNY1079; Next Due: 85ASS2173;  Active    Future Appointments    Date/Time Provider Specialty Site   01/20/2017 03:20 PM YAA Boggs [facility]     Signatures   Electronically signed by : TAHMINA Rodrigez ; Aug  4 2016  7:04PM EST                       (Author)

## 2018-01-14 NOTE — PROGRESS NOTES
Assessment    1  Hyperlipidemia (272 4) (E78 5)   2  Encounter for preventive health examination (V70 0) (Z00 00)   3  Osteoporosis (733 00) (M81 0)   4  Thrombocytosis (238 71) (D47 3)    Plan  Hyperlipidemia    · (1) CBC/PLT/DIFF; Status:Active - Retrospective Authorization; Requested  for:23Nov2017;    · (1) COMPREHENSIVE METABOLIC PANEL; Status:Active - Retrospective Authorization; Requested for:23Nov2017;    · (1) LIPID PANEL, FASTING; Status:Active - Retrospective Authorization; Requested  for:23Nov2017;    · (1) TSH; Status:Active - Retrospective Authorization; Requested for:23Nov2017;   PMH: Encounter for screening mammogram for malignant neoplasm of breast    · * MAMMO SCREENING BILATERAL W CAD; Status:Active - Retrospective By Protocol  Authorization; Requested NJV:[id number];   Vitamin D deficiency    · (1) VITAMIN D 25-HYDROXY; Status:Active - Retrospective Authorization; Requested  for:23Nov2017;     BW in November  RV for Gyn exam      Discussion/Summary    1  HM-up to date  Will RV for Gyn exam as she forgot to take her antibiotic prophylaxis  2  Osteoporosis-  3  Thrombocytosis-has had a workup which was negative  4  S/P Right TKR     History of Present Illness  HM, Adult Female: The patient is being seen for a health maintenance evaluation  General Health: The patient's health since the last visit is described as good  She complains of vision problems  Vision care includes an eye examination within the last year and Dr Jennifer Olivia  Lifestyle:  She consumes a diverse and healthy diet  (Her diet is good  Eats fruits and vegetables , whole grains  Caffeine 0-1 daily  )   She does not have any weight concerns  (She lost weight from her surgery, job les, etc)   She exercises regularly  (Walking regularly)   She consumes alcohol  She reports occasional alcohol use  Screening:   HPI: Ana Price got a new job working at [employer] in [employer]  Full time Just started this week    She is feeling well  She had her right TKR and it went well  She saw hematology preop for her thrombocytosis  Concerned about hypercoagulable state  and risk for DVT  SAUL mutation negative  No treatment or f/u needed  She is seeing rheumatology for her osteoporosis   Took Forteo for 2 years  Had recent Dexa scan  Active Problems    1  Abnormal blood chemistry (790 6) (R79 9)   2  Acute exacerbation of chronic low back pain (724 2,338 19,338 29) (M54 5,G89 29)   3  Acute lumbar back pain (724 2) (M54 5)   4  Acute rhinitis (460) (J00)   5  Adjustment disorder with anxiety (309 24) (F43 22)   6  Backache (724 5) (M54 9)   7  Bilateral ovarian cysts (620 2) (N83 201,N83 202)   8  Bronchitis (490) (J40)   9  Calf pain (729 5) (M79 669)   10  Compression fracture of spine (805 8) (M48 50XA)   11  Degenerative lumbar disc (722 52) (M51 36)   12  Dysfunction of eustachian tube, unspecified laterality (381 81) (H69 80)   13  Encounter for monitoring teriparatide therapy (V58 83,V58 69) (Z51 81,Z79 899)   14  Esophageal reflux (530 81) (K21 9)   15  Flu vaccine need (V04 81) (Z23)   16  Hyperlipidemia (272 4) (E78 5)   17  Insomnia (780 52) (G47 00)   18  Jaw pain (784 92) (R68 84)   19  Joint pain (719 40) (M25 50)   20  Knee injury, right, initial encounter (959 7) (S89 91XA)   21  Lumbar arthropathy (721 3) (M46 96)   22  Mammogram abnormal (793 80) (R92 8)   23  Need for shingles vaccine (V04 89) (Z23)   24  Osteoporosis (733 00) (M81 0)   25  Other acute sinusitis (461 8) (J01 80)   26  Primary generalized (osteo)arthritis (715 09) (M15 0)   27  Sacroiliitis (720 2) (M46 1)   28  Thrombocytosis (238 71) (D47 3)   29  Trochanteric bursitis of right hip (726 5) (M70 61)   30  Vaginitis, atrophic (627 3) (N95 2)   31  Visit for routine gyn exam (V72 31) (Z01 419)   32  Vitamin D deficiency (268 9) (E55 9)   33   Weight loss, non-intentional (783 21) (R63 4)    Past Medical History    · History of Age At [address] 14 Years Old (Menarche)   · History of Age At First Pregnancy 28 Years Old   · History of Arthritis (V13 4)   · History of ASCUS favor benign (796 9)   · History of Breast cancer screening (V76 10) (Z12 39)   · History of Colonoscopy (Fiberoptic)   · History of Encounter for screening colonoscopy (V76 51) (Z12 11)   · History of Encounter for screening mammogram for malignant neoplasm of breast  (V76 12) (Z12 31)   · History Of 2  Previous Pregnancies (V61 5)   · History of gastroesophageal reflux (GERD) (V12 79) (Z87 19)   · History of Menopause (627 2) (Z78 0)   · History of Ovarian cyst (620 2) (N83 20)   · History of Postmenopausal bleeding (627 1) (N95 0)   · History of Previous Pregnancies Resulted In 2  Live Birth(S)   · History of Rheumatic fever (390) (I00)    Surgical History    · History of Arthrodesis Thumb Carpometacarpal Joint   · History of Diagnostic Esophagogastroduodenoscopy   · History of Dilation And Curettage Of Cervical Stump   · History of Ear Surgery   · History of Endometrial Biopsy By Suction   · History of Knee Arthroscopy With Medial Meniscectomy   · History of Knee Surgery   · History of Tubal Ligation    Family History  Mother    · Family history of Arthritis (V17 7)   · Family history of Diabetes Mellitus (V18 0)   · Family history of Family Health Status Of Mother - Alive   · Family history of osteoporosis (V17 81) (Z80 61)  Father    · Family history of Diabetes Mellitus (V18 0)   · Family history of Family Health Status Of Father - Alive   · Family history of Heart Disease (V17 49)   · Family history of Prostate Cancer (V16 42)  Family History    · Denied: Family history of colitis   · Denied: Family history of Crohn's disease   · Denied: Family history of psoriasis   · Denied: Family history of rheumatoid arthritis   · Denied: Family history of systemic lupus erythematosus    Social History    · Denied: History of Being A Social Drinker   · Caffeine Use   · Never a smoker   · Occasional alcohol use   · 2 drinks/month   · Denied: History of Tobacco Use   · Two children    Current Meds   1  Calcium 600 MG Oral Tablet; TAKE 1 TABLET DAILY; Therapy: 85Lug7360 to (Evaluate:21Aug2016); Last Rx:98Ejx4895 Ordered   2  Clindamycin HCl - 300 MG Oral Capsule; TAKE 2 CAPSULES 1 HOUR PRIOR TO   PROCEDURE; Therapy: 02Aug2017 to (Last Rx:02Aug2017)  Requested for: 88Sgj6329 Ordered   3  GNP Calcium 600 +D TABS; Take 1 tablet daily; Therapy: (Recorded:04Jun2015) to Recorded   4  Ibuprofen 200 MG Oral Tablet; TAKE 2 TABLET Daily PRN pain; Therapy: (Cara Felix) to Recorded   5  Omeprazole 40 MG Oral Capsule Delayed Release; TAKE 1 CAPSULE Daily  Requested   for: 23Apr2017; Last Rx:21Apr2017 Ordered   6  Premarin 0 625 MG/GM Vaginal Cream; INSERT 1 GRAM INTRAVAGINALLY  DAILY; Therapy: 21Apr2011 to (Evaluate:20Nov2017)  Requested for: 31Pvl7167; Last   Rx:57Lwi3271 Ordered   7  Vitamin D 2000 UNIT Oral Tablet; Take 1 tablet daily; Therapy: (Recorded:20Jan2017) to Recorded    Allergies    1  Penicillins    Vitals   Recorded: 10Aug2017 05:59PM   Heart Rate 80   Respiration 16   Systolic 900   Diastolic 70   Height 5 ft 5 in   Weight 133 lb    BMI Calculated 22 13   BSA Calculated 1 66   LMP 1999     Physical Exam    Constitutional   General appearance: No acute distress, well appearing and well nourished  Head and Face   Head and face: Normal     Eyes   Conjunctiva and lids: No swelling, erythema or discharge  Pupils and irises: Equal, round, reactive to light  Ears, Nose, Mouth, and Throat   Otoscopic examination: Tympanic membranes translucent with normal light reflex  Canals patent without erythema  Oropharynx: Normal with no erythema, edema, exudate or lesions  Neck   Neck: Supple, symmetric, trachea midline, no masses  Thyroid: Normal, no thyromegaly  Pulmonary   Respiratory effort: No increased work of breathing or signs of respiratory distress  Auscultation of lungs: Clear to auscultation  Cardiovascular   Auscultation of heart: Normal rate and rhythm, normal S1 and S2, no murmurs  Carotid pulses: 2+ bilaterally  Abdominal aorta: Normal     Femoral pulses: 2+ bilaterally  Pedal pulses: 2+ bilaterally  Peripheral vascular exam: Normal     Examination of extremities for edema and/or varicosities: Normal     Abdomen   Abdomen: Non-tender, no masses  Liver and spleen: No hepatomegaly or splenomegaly  Lymphatic   Palpation of lymph nodes in neck: No lymphadenopathy  Palpation of lymph nodes in axillae: No lymphadenopathy  Palpation of lymph nodes in groin: No lymphadenopathy  Musculoskeletal   Gait and station: Normal     Psychiatric   Judgment and insight: Normal     Mood and affect: Normal        Health Management  History of Encounter for screening colonoscopy   COLONOSCOPY; every 10 years; Last 75VFR1661; Next Due: 03EOL2152; Active    Future Appointments    Date/Time Provider Specialty Site   08/17/2017 08:30 PM TAHMINA Hi   [address] [facility]     Signatures   Electronically signed by : TAHMINA Sage ; Aug 10 2017 10:30PM EST                       (Author)

## 2018-01-16 NOTE — PROGRESS NOTES
Assessment    1  Osteoporosis (733 00) (M81 0)   2  Compression fracture of spine (805 8) (M48 50XA)   3  Primary generalized (osteo)arthritis (715 09) (M15 0)   4  Encounter for monitoring teriparatide therapy (V58 83,V58 69) (Z51 81,Z79 899)   5  Esophageal reflux (530 81) (K21 9)   6  Hyperlipidemia (272 4) (E78 5)    Plan    1  (1) CBC/PLT/DIFF; Status:Active; Requested for:47Cvr0828;    2  (1) COMPREHENSIVE METABOLIC PANEL; Status:Active; Requested for:24Ckt3189;    3  (1) PTH N-TERMINAL (INTACT); Status:Active; Requested for:44Qyc2500;    4  (1) TSH; Status:Active; Requested for:50Wdx0719;    5  (1) VITAMIN D 25-HYDROXY; Status:Active; Requested for:29Ciw0183;    6  * DXA BONE DENSITY SPINE HIP AND PELVIS; Status:Hold For - Scheduling; Requested   for:12Bbl6933;    7  Follow-up visit in 6 months Evaluation and Treatment  Follow-up  Status: Hold For -   Scheduling  Requested for: 14Lkg8650   8  Call [phone] if: The pain seems worse ; Status:Complete;   Done: 33NKJ9168   9  Call [phone] if: The symptoms seem worse ; Status:Complete;   Done:   72JUI1653   10  Call [phone] if: You have questions or concerns about your problem ;    Status:Complete;   Done: 07IAH0815    Discussion/Summary    This is a 23-year-old female presenting today for follow-up for osteoporosis  The patient states that she will finish her 24 month cycle of Forteo in mid August  She states that she did have a right total knee replacement in May with significant relief  She has completed physical therapy  The patient states she is feeling much better  She denies any further joint pain and has no other obvious joint swelling aside from occasional hand and foot swelling with weather changes  She has no morning stiffness and denies difficulty with sleep  She does have some nonrestorative sleep and does describe fatigue on some days  Prior to [facility] she did treat with Reclast as well as Boniva for years   On physical examination, there is no active synovitis  Patient has normal passive range of motion of both upper and lower extremities  She has a total knee replacement of the right with crepitus of the left knee  At this time patient's history and physical examination is most consistent with osteoporosis which appears to be stable with the use of Forteo  Patient will complete her last dose in mid August  Patient was advised to continue with her vitamin D and calcium supplementation  When she has completed Forteo she will get an updated DEXA scan  We will begin prior authorization for Prolia at this time and she was provided with information in regards to Prolia  Patient was sent for lab work including a CBC, CMP, vitamin D level, TSH, and PTH  We will begin prior authorization for Prolia at this time and we will contact the patient once it is approved for her first injection  Counseling  Rheumatology Counseling Documentation: The patient was counseled regarding instructions for management, prognosis, patient and family education, risks and benefits of treatment options and importance of compliance with treatment  The following educational handouts were provided: Prolia  Chief Complaint  F/U OP   Patient is here today for follow up of chronic conditions described in HPI  History of Present Illness  Patient is in the office today for follow up for OP  Finished with Forteo in mid August  Right knee replacement in May with Dr Neil Singh  Feeling much better at this time  No other joint pain  No obvious joint swelling aside from occasion hand and foot swelling with weather changes  No Am stiffness  No difficulty with sleep  +Some non restorative sleep  +Fatigue some days  Was on Reclast and Boniva x few years  RAPID3: /30      Review of Systems    Constitutional: fatigue and anorexia, but no fever, no recent weight gain, no chills and no recent weight loss     HEENT: no blurred vision, no double vision, no dryness of the eyes, no dryness mouth, not feeling congested and no sore throat  Cardiovascular: swelling in the arms or legs, but no chest pain or pressure and no dyspnea on exertion  Respiratory: no unusual or persistent cough, no shortness of breath and no pleurisy  Gastrointestinal: no abdominal pain, no nausea, no vomiting, no heartburn, no diarrhea, no constipation, no melena and no BRBPR  Genitourinary: no dysuria and no hematuria  Musculoskeletal: as noted in HPI  Integumentary no rash, no alopecia and no nail changes  Endocrine no polyuria and no polydipsia  Hematologic/Lymphatic: no unusual bleeding and no tendency for easy bruising  Neurological: no headache, no vertigo or dizziness, no tingling and no weakness  Psychiatric: non-restorative sleep, but no insomnia  Active Problems    1  Abnormal blood chemistry (790 6) (R79 9)   2  Acute exacerbation of chronic low back pain (724 2,338 19,338 29) (M54 5,G89 29)   3  Acute lumbar back pain (724 2) (M54 5)   4  Acute rhinitis (460) (J00)   5  Adjustment disorder with anxiety (309 24) (F43 22)   6  Backache (724 5) (M54 9)   7  Bilateral ovarian cysts (620 2) (N83 201,N83 202)   8  Bronchitis (490) (J40)   9  Calf pain (729 5) (M79 669)   10  Compression fracture of spine (805 8) (M48 50XA)   11  Degenerative lumbar disc (722 52) (M51 36)   12  Dysfunction of eustachian tube, unspecified laterality (381 81) (H69 80)   13  Encounter for monitoring teriparatide therapy (V58 83,V58 69) (Z51 81,Z79 899)   14  Esophageal reflux (530 81) (K21 9)   15  Flu vaccine need (V04 81) (Z23)   16  Hyperlipidemia (272 4) (E78 5)   17  Insomnia (780 52) (G47 00)   18  Jaw pain (784 92) (R68 84)   19  Joint pain (719 40) (M25 50)   20  Knee injury, right, initial encounter (959 7) (S89 91XA)   21  Lumbar arthropathy (721 3) (M46 96)   22  Mammogram abnormal (793 80) (R92 8)   23  Need for shingles vaccine (V04 89) (Z23)   24   Osteoporosis (733 00) (M81 0) 25  Other acute sinusitis (461 8) (J01 80)   26  Primary generalized (osteo)arthritis (715 09) (M15 0)   27  Sacroiliitis (720 2) (M46 1)   28  Thrombocytosis (238 71) (D47 3)   29  Trochanteric bursitis of right hip (726 5) (M70 61)   30  Vaginitis, atrophic (627 3) (N95 2)   31  Visit for routine gyn exam (V72 31) (Z01 419)   32  Vitamin D deficiency (268 9) (E55 9)   33  Weight loss, non-intentional (783 21) (R63 4)    Past Medical History    1  History of Age At First Period 14 Years Old (Menarche)   2  History of Age At First Pregnancy 28 Years Old   3  History of Arthritis (V13 4)   4  History of ASCUS favor benign (796 9)   5  History of Breast cancer screening (V76 10) (Z12 39)   6  History of Colonoscopy (Fiberoptic)   7  History of Encounter for screening colonoscopy (V76 51) (Z12 11)   8  History of Encounter for screening mammogram for malignant neoplasm of breast   (V76 12) (Z12 31)   9  History Of 2  Previous Pregnancies (V61 5)   10  History of gastroesophageal reflux (GERD) (V12 79) (Z87 19)   11  History of Menopause (627 2) (Z78 0)   12  History of Ovarian cyst (620 2) (N83 20)   13  History of Postmenopausal bleeding (627 1) (N95 0)   14  History of Previous Pregnancies Resulted In 2  Live Birth(S)   15  History of Rheumatic fever (390) (I00)    The active problems and past medical history were reviewed and updated today  Surgical History    1  History of Arthrodesis Thumb Carpometacarpal Joint   2  History of Diagnostic Esophagogastroduodenoscopy   3  History of Dilation And Curettage Of Cervical Stump   4  History of Ear Surgery   5  History of Endometrial Biopsy By Suction   6  History of Knee Arthroscopy With Medial Meniscectomy   7  History of Knee Surgery   8  History of Tubal Ligation    The surgical history was reviewed and updated today  Family History  Mother    1  Family history of Arthritis (V17 7)   2  Family history of Diabetes Mellitus (V18 0)   3   Family history of Family Health Status Of Mother - Alive   4  Family history of osteoporosis (V17 81) (Z82 62)  Father    5  Family history of Diabetes Mellitus (V18 0)   6  Family history of Family Health Status Of Father - Alive   7  Family history of Heart Disease (V17 49)   8  Family history of Prostate Cancer (V16 42)  Family History    9  Denied: Family history of colitis   10  Denied: Family history of Crohn's disease   6  Denied: Family history of psoriasis   12  Denied: Family history of rheumatoid arthritis   13  Denied: Family history of systemic lupus erythematosus    The family history was reviewed and updated today  Social History    · Denied: History of Being A Social Drinker   · Caffeine Use   · Never a smoker   · Occasional alcohol use   · Denied: History of Tobacco Use   · Two children  The social history was reviewed and updated today  The social history was reviewed and is unchanged  Current Meds   1  Calcium 600 MG Oral Tablet; TAKE 1 TABLET DAILY; Therapy: 29Khr3753 to (Evaluate:94Liw6112); Last Rx:27Smb7429 Ordered   2  Forteo 600 MCG/2 4ML Subcutaneous Solution; INJECT 20 MCG  SUBCUTANEOUSLY   ONCE DAILY AS DIRECTED; Therapy: ([phone]) to Recorded   3  GNP Calcium 600 +D TABS; Take 1 tablet daily; Therapy: (Recorded:2015) to Recorded   4  Ibuprofen 200 MG Oral Tablet; TAKE 2 TABLET Daily PRN pain; Therapy: (Daron Alfonso) to Recorded   5  Omeprazole 40 MG Oral Capsule Delayed Release; TAKE 1 CAPSULE Daily  Requested   for: 2017; Last Rx:2017 Ordered   6  Premarin 0 625 MG/GM Vaginal Cream; INSERT 1 GRAM INTRAVAGINALLY  DAILY; Therapy: 2011 to (Evaluate:2017)  Requested for: 56Jcu1210; Last   Rx:[id number] Ordered   7  Vitamin D 2000 UNIT Oral Tablet; Take 1 tablet daily; Therapy: (Recorded:2017) to Recorded    The medication list was reviewed and updated today        Immunizations  Influenza --- Camille Cho: 2017   Tdap --- Camille Cho: 02-May-2013   Zoster --- [place] Chancy: 02-Feb-2017     Allergies    1  Penicillins    Vitals  Signs   Recorded: 40Nhv0778 10:00AM   Heart Rate: 88  Systolic: 843  Diastolic: 66  Height: 5 ft 5 in  Weight: 141 lb   BMI Calculated: 23 46  BSA Calculated: 1 71    Physical Exam    Constitutional   General appearance: No acute distress, well appearing and well nourished  Eyes   Conjunctiva and lids: No swelling, erythema or discharge  Pupils and irises: Equal, round and reactive to light  Ears, Nose, Mouth, and Throat   External inspection of ears and nose: Normal     Oropharynx: Normal with no erythema, edema, exudate lesions, or ulcers  Pulmonary   Respiratory effort: No increased work of breathing or signs of respiratory distress  Auscultation of lungs: Clear to auscultation  Cardiovascular   Auscultation of heart: Normal rate and rhythm, normal S1 and S2, without murmurs  Examination of extremities for edema and/or varicosities: Normal     Lymphatic   Palpation of lymph nodes in neck: No lymphadenopathy  Psychiatric   Orientation to person, place, and time: Normal     Mood and affect: Normal         Right Upper Extremity: Normal ROM  Left Upper Extremity: Normal ROM  Right Lower Extremity: Normal ROM  Left Lower Extremity: Normal ROM  Musculoskeletal - Joints, bones, and muscles: Abnormal  Palpation - left knee crepitus (R TKA)  Right hand: All MCP, PIP and DIP joints are normal  Left Hand: All MCP, PIP and DIP joints are normal    Right foot: All MTP, PIP and DIP joints are normal  Left foot: All MTP, PIP and DIP joints are normal       Health Management  History of Encounter for screening colonoscopy   COLONOSCOPY; every 10 years; Last 54CRH3080; Next Due: 44YXY5698; Active    Attending Note  Collaborating Physician: I did not interview and examine the patient, I discussed the case with the Advanced Practitioner and reviewed the note and I agree with the Advanced Practitioner note  Future Appointments    Date/Time Provider Specialty Site   08/10/2017 06:00 PM TAHMINA [place]   [address] [facility] [employer]     Signatures   Electronically signed by : Aminah Prater [facility]; Jul 20 2017 10:22AM EST                       (Author)    Electronically signed by : Juliocesar Agudelo DO; Jul 20 2017 10:25AM EST                       (Author)

## 2018-01-17 NOTE — PROGRESS NOTES
Assessment    1  Osteoporosis (733 00) (M81 0)   2  Compression fracture of spine (805 8) (M48 50XA)   3  Primary generalized (osteo)arthritis (715 09) (M15 0)   4  Encounter for monitoring teriparatide therapy (V58 83,V58 69) (Z51 81,Z79 899)   5  Esophageal reflux (530 81) (K21 9)   6  Hyperlipidemia (272 4) (E78 5)    Plan    1  Calcium 600 MG Oral Tablet; TAKE 1 TABLET DAILY    2  (1) CBC/PLT/DIFF; Status:Active; Requested HOY:49KEC4203;    3  (1) COMPREHENSIVE METABOLIC PANEL; Status:Active; Requested SXC:28SEO0532;    4  (1) TSH; Status:Active; Requested HANS:42UCM3816;    5  (1) VITAMIN D 25-HYDROXY; Status:Active; Requested QDU:58ROB7322;    6  Follow-up visit in 6 months Evaluation and Treatment  Follow-up  Status: Hold For -   Scheduling  Requested for: 20Jan2017   7  Call [phone] if: The pain seems worse ; Status:Complete;   Done: 16SUD9635   8  Call [phone] if: The symptoms seem worse ; Status:Complete;   Done:   54SLG6970   9  Call [phone] if: You have questions or concerns about your problem ;   Status:Complete;   Done: 93RQG0240    10  Forteo 600 MCG/2 4ML Subcutaneous Solution; INJECT 20 MCG  SUBCUTANEOUSLY ONCE DAILY AS DIRECTED   11  Vitamin D 2000 UNIT Oral Tablet; Take 1 tablet daily    Discussion/Summary    This is a 71-year-old female presenting today for follow-up for osteoporosis  Patient states that she's been doing well  She continues to have right knee pain he did have an MRI completed today  She does see [facility] for treatment of her knee where she recently received cortisone injection with some relief  She states she also has low back pain as well as right hip pain  She denies any numbness or weakness in the legs at this time  She denies any obvious joint swelling but does describe morning stiffness lasting a few minutes  She denies difficulty with sleep however does have occasional nonrestorative sleep  She denies fatigue   She is utilizing ibuprofen as needed for pain with some relief  She states that she has been completing Forteo injections daily since August 2015  She still utilizes calcium 600 mg daily and does try to have at least one dairy products a day  She also utilizes vitamin D 2000 units daily  On physical examination there is no synovitis  Patient does have tenderness of the right side of lumbar spine as well as the right greater trochanteric hip bursa  She also has discomfort with internal and external rotation of the right hip  She also has tenderness of the right knee  There is crepitus of bilateral knees  Patient's most recent labs performed by her primary care physician including a CBC, CMP, TSH, and vitamin D level were essentially within normal range however patient's vitamin D level is slightly low at 29  At this time patient's history and physical examination is most consistent with osteoporosis which is currently stable with the use of Forteo injections daily  Patient was told to continue with her vitamin D calcium supplementation  Patient will obtain a CBC, CMP, TSH, and vitamin D level before her next follow-up in 6 months time  We will plan to obtain a DEXA scan at the next appointment and discuss further options once her 2 year cycle of Forteo is complete  Counseling  Rheumatology Counseling Documentation: The patient and patient's family was counseled regarding diagnostic results, instructions for management, prognosis, patient and family education, risks and benefits of treatment options and importance of compliance with treatment  Chief Complaint  F/U OP   Patient is here today for follow up of chronic conditions described in HPI  History of Present Illness  Patient is in the office today for follow up for OP  Feeling okay  Still with right knee pain and had MRI today  Seeing [facility] for the knee  Pain in the low back pain in the right hip  No numbness or weakness  No obvious joint swelling   +Am stiffness x few minutes  No difficulty with sleep  +Occasional non restorative sleep  No fatigue  Taking Ibuprofen as needed for pain with some relief  Still on the Forteo started in August 2015  Still with Ca 600mg daily and drinking milk  Taking vitamin D 2000 Iu daily  RAPID3: 5 7 /30      Review of Systems    Constitutional: no fever, no recent weight gain, fatigue, no chills, no recent weight loss and no anorexia  HEENT: no blurred vision, no double vision, no amaurosis fugax, no dryness of the eyes, no dryness mouth, not feeling congested and no sore throat  Cardiovascular: no chest pain or pressure, no dyspnea on exertion and no swelling in the arms or legs  Respiratory: no unusual or persistent cough, no shortness of breath and no pleurisy  Gastrointestinal: no abdominal pain, no nausea, no vomiting, no heartburn, no diarrhea, no constipation, no melena and no BRBPR  Genitourinary: No foamy urine, but no dysuria and no hematuria  Musculoskeletal: as noted in HPI  Integumentary no rash, no Raynaud's and no alopecia  Endocrine no polyuria and no polydipsia  Hematologic/Lymphatic: no unusual bleeding and no tendency for easy bruising  Neurological: no headache, no vertigo or dizziness, no tingling and no weakness  Psychiatric: insomnia and non-restorative sleep  Active Problems    1  Abnormal blood chemistry (790 6) (R79 9)   2  Acute exacerbation of chronic low back pain (724 2,338 19,338 29) (M54 5,G89 29)   3  Acute lumbar back pain (724 2) (M54 5)   4  Acute rhinitis (460) (J00)   5  Adjustment disorder with anxiety (309 24) (F43 22)   6  Backache (724 5) (M54 9)   7  Bilateral ovarian cysts (620 2) (N83 201,N83 202)   8  Bronchitis (490) (J40)   9  Calf pain (729 5) (M79 669)   10  Compression fracture of spine (805 8) (M48 50XA)   11  Degenerative lumbar disc (722 52) (M51 36)   12  Dysfunction of eustachian tube, unspecified laterality (381 81) (H69 80)   13   Encounter for monitoring teriparatide therapy (V58 83,V58 69) (Z51 81,Z79 899)   14  Esophageal reflux (530 81) (K21 9)   15  Hyperlipidemia (272 4) (E78 5)   16  Insomnia (780 52) (G47 00)   17  Jaw pain (784 92) (R68 84)   18  Joint pain (719 40) (M25 50)   19  Knee injury, right, initial encounter (959 7) (S89 91XA)   20  Lumbar arthropathy (721 3) (M46 96)   21  Mammogram abnormal (793 80) (R92 8)   22  Osteoporosis (733 00) (M81 0)   23  Other acute sinusitis (461 8) (J01 80)   24  Primary generalized (osteo)arthritis (715 09) (M15 0)   25  Sacroiliitis (720 2) (M46 1)   26  Thrombocytosis (238 71) (D47 3)   27  Trochanteric bursitis of right hip (726 5) (M70 61)   28  Vaginitis, atrophic (627 3) (N95 2)   29  Visit for routine gyn exam (V72 31) (Z01 419)   30  Vitamin D deficiency (268 9) (E55 9)   31  Weight loss, non-intentional (783 21) (R63 4)    Past Medical History    1  History of Age At First Period 14 Years Old (Menarche)   2  History of Age At First Pregnancy 28 Years Old   3  History of Arthritis (V13 4)   4  History of ASCUS favor benign (796 9)   5  History of Breast cancer screening (V76 10) (Z12 39)   6  History of Colonoscopy (Fiberoptic)   7  History of Encounter for screening colonoscopy (V76 51) (Z12 11)   8  History of Encounter for screening mammogram for malignant neoplasm of breast   (V76 12) (Z12 31)   9  History Of 2  Previous Pregnancies (V61 5)   10  History of gastroesophageal reflux (GERD) (V12 79) (Z87 19)   11  History of Menopause (627 2) (Z78 0)   12  History of Ovarian cyst (620 2) (N83 20)   13  History of Postmenopausal bleeding (627 1) (N95 0)   14  History of Previous Pregnancies Resulted In 2  Live Birth(S)   15  History of Rheumatic fever (390) (I00)    The active problems and past medical history were reviewed and updated today  Surgical History    1  History of Arthrodesis Thumb Carpometacarpal Joint   2  History of Diagnostic Esophagogastroduodenoscopy   3   History of Dilation And Curettage Of Cervical Stump   4  History of Ear Surgery   5  History of Endometrial Biopsy By Suction   6  History of Knee Arthroscopy With Medial Meniscectomy   7  History of Knee Surgery   8  History of Tubal Ligation    The surgical history was reviewed and updated today  Family History  Mother    1  Family history of Arthritis (V17 7)   2  Family history of Diabetes Mellitus (V18 0)   3  Family history of Family Health Status Of Mother - Alive   4  Family history of osteoporosis (V17 81) (Z82 62)  Father    5  Family history of Diabetes Mellitus (V18 0)   6  Family history of Family Health Status Of Father - Alive   7  Family history of Heart Disease (V17 49)   8  Family history of Prostate Cancer (V16 42)  Family History    9  Denied: Family history of colitis   10  Denied: Family history of Crohn's disease   6  Denied: Family history of psoriasis   12  Denied: Family history of rheumatoid arthritis   13  Denied: Family history of systemic lupus erythematosus    The family history was reviewed and updated today  Social History    · Denied: History of Being A Social Drinker   · Caffeine Use   · Never a smoker   · Occasional alcohol use   · Denied: History of Tobacco Use   · Two children  The social history was reviewed and updated today  The social history was reviewed and is unchanged  Current Meds   1  Calcium 600 MG Oral Tablet; TAKE 1 TABLET DAILY; Therapy: 51Vjy4004 to (Evaluate:43Rwl9233); Last Rx:16Rys0944 Ordered   2  Forteo 600 MCG/2 4ML Subcutaneous Solution; INJECT 20 MCG  SUBCUTANEOUSLY   ONCE DAILY AS DIRECTED; Therapy: (Ronal Chester) to Recorded   3  GNP Calcium 600 +D TABS; Take 1 tablet daily; Therapy: (Recorded:04Jun2015) to Recorded   4  Ibuprofen 200 MG Oral Tablet; TAKE 2 TABLET Daily PRN pain; Therapy: (Virgilio Yu) to Recorded   5  PriLOSEC 40 MG CPDR; TAKE 1 CAPSULE DAILY; Therapy: (Recorded:04Jun2015) to Recorded   6   Vitamin D 2000 UNIT Oral Tablet; Take 1 tablet daily; Therapy: (Recorded:20Jan2017) to Recorded    The medication list was reviewed and updated today  Immunizations  Tdap --- Geno Mj: 02-May-2013     Allergies    1  Penicillins    Vitals  Signs   Recorded: 20Jan2017 03:18PM   Heart Rate: 954  Systolic: 764  Diastolic: 86  Weight: 884 lb   BMI Calculated: 22 9  BSA Calculated: 1 72    Physical Exam    Constitutional   General appearance: No acute distress, well appearing and well nourished  Eyes   Conjunctiva and lids: No swelling, erythema or discharge  Pupils and irises: Equal, round and reactive to light  Ears, Nose, Mouth, and Throat   External inspection of ears and nose: Normal     Oropharynx: Normal with no erythema, edema, exudate lesions, or ulcers  Pulmonary   Respiratory effort: No increased work of breathing or signs of respiratory distress  Auscultation of lungs: Clear to auscultation  Cardiovascular   Auscultation of heart: Normal rate and rhythm, normal S1 and S2, without murmurs  Examination of extremities for edema and/or varicosities: Normal     Lymphatic   Palpation of lymph nodes in neck: No lymphadenopathy  Psychiatric   Orientation to person, place, and time: Normal     Mood and affect: Normal         Right knee tenderness  Right hip tenderness  Right Lower Extremity: Abnormal ROM  Discomfort with internal and external rotation of right hip  Musculoskeletal - Joints, bones, and muscles: Abnormal  Palpation - right lower lumbar tenderness and bilateral knee crepitus  Right hand: All MCP, PIP and DIP joints are normal  Left Hand: All MCP, PIP and DIP joints are normal    Right foot: All MTP, PIP and DIP joints are normal  Left foot: All MTP, PIP and DIP joints are normal       Health Management  History of Encounter for screening colonoscopy   COLONOSCOPY; every 10 years; Last 49ISP2319; Next Due: 33EKW0451;  Active    Attending Note  Collaborating Physician: I did not interview and examine the patient, I discussed the case with the Advanced Practitioner and reviewed the note and I agree with the Advanced Practitioner note  Future Appointments    Date/Time Provider Specialty Site   08/10/2017 06:00 PM TAHMINA Spencer   [facility] [address]   07/20/2017 03:00 PM Marissa Justice [facility] [address] [facility]     Signatures   Electronically signed by : Marissa Dempsey; Jan 20 2017  3:42PM EST                       (Author)    Electronically signed by : Burak Coates DO; Jan 20 2017  4:34PM EST                       (Author)

## 2018-01-18 NOTE — PROGRESS NOTES
Assessment    1  Osteoporosis (733 00) (M81 0)    Plan    1  Prolia 60 MG/ML Subcutaneous Solution    Discussion/Summary    Patient was seen in the office today for her first Prolia injection  Prolia 60 mg was injected subcutaneously into her left upper extremity  Patient tolerated the procedure left without complication  Patient did have an updated DEXA scan that did reveal a T score of -1 5 at the left femoral neck and a T score of -3 3 at the lumbar spine  Patient will plan to return to the office in 6 months time for her next injection however contact the office in the interim if she has any further questions or concerns  Counseling  Rheumatology Counseling Documentation: The patient was counseled regarding diagnostic results, instructions for management, prognosis, patient and family education, risks and benefits of treatment options and importance of compliance with treatment  Chief Complaint  F/U OP   Patient is here today for follow up of chronic conditions described in HPI  History of Present Illness  Patient is in the office for a Prolia injection for her OP  Active Problems    1  Abnormal blood chemistry (790 6) (R79 9)   2  Acute exacerbation of chronic low back pain (724 2,338 19,338 29) (M54 5,G89 29)   3  Acute lumbar back pain (724 2) (M54 5)   4  Acute rhinitis (460) (J00)   5  Adjustment disorder with anxiety (309 24) (F43 22)   6  Backache (724 5) (M54 9)   7  Bilateral ovarian cysts (620 2) (N83 201,N83 202)   8  Bronchitis (490) (J40)   9  Calf pain (729 5) (M79 669)   10  Compression fracture of spine (805 8) (M48 50XA)   11  Degenerative lumbar disc (722 52) (M51 36)   12  Dysfunction of eustachian tube, unspecified laterality (381 81) (H69 80)   13  Encounter for monitoring teriparatide therapy (V58 83,V58 69) (Z51 81,Z79 899)   14  Esophageal reflux (530 81) (K21 9)   15  Flu vaccine need (V04 81) (Z23)   16  Hyperlipidemia (272 4) (E78 5)   17   Insomnia (780 52) (G47 00)   18  Jaw pain (784 92) (R68 84)   19  Joint pain (719 40) (M25 50)   20  Knee injury, right, initial encounter (959 7) (S89 91XA)   21  Lumbar arthropathy (721 3) (M46 96)   22  Mammogram abnormal (793 80) (R92 8)   23  Need for shingles vaccine (V04 89) (Z23)   24  Osteoporosis (733 00) (M81 0)   25  Other acute sinusitis (461 8) (J01 80)   26  Primary generalized (osteo)arthritis (715 09) (M15 0)   27  Sacroiliitis (720 2) (M46 1)   28  Thrombocytosis (238 71) (D47 3)   29  Trochanteric bursitis of right hip (726 5) (M70 61)   30  Vaginitis, atrophic (627 3) (N95 2)   31  Visit for routine gyn exam (V72 31) (Z01 419)   32  Vitamin D deficiency (268 9) (E55 9)   33  Weight loss, non-intentional (783 21) (R63 4)    Past Medical History    1  History of Age At First Period 14 Years Old (Menarche)   2  History of Age At First Pregnancy 28 Years Old   3  History of Arthritis (V13 4)   4  History of ASCUS favor benign (796 9)   5  History of Breast cancer screening (V76 10) (Z12 39)   6  History of Colonoscopy (Fiberoptic)   7  History of Encounter for screening colonoscopy (V76 51) (Z12 11)   8  History of Encounter for screening mammogram for malignant neoplasm of breast   (V76 12) (Z12 31)   9  History Of 2  Previous Pregnancies (V61 5)   10  History of gastroesophageal reflux (GERD) (V12 79) (Z87 19)   11  History of Menopause (627 2) (Z78 0)   12  History of Ovarian cyst (620 2) (N83 20)   13  History of Postmenopausal bleeding (627 1) (N95 0)   14  History of Previous Pregnancies Resulted In 2  Live Birth(S)   15  History of Rheumatic fever (390) (I00)    Surgical History    1  History of Arthrodesis Thumb Carpometacarpal Joint   2  History of Diagnostic Esophagogastroduodenoscopy   3  History of Dilation And Curettage Of Cervical Stump   4  History of Ear Surgery   5  History of Endometrial Biopsy By Suction   6  History of Knee Arthroscopy With Medial Meniscectomy   7  History of Knee Surgery   8  History of Tubal Ligation    Family History  Mother    1  Family history of Arthritis (V17 7)   2  Family history of Diabetes Mellitus (V18 0)   3  Family history of Family Health Status Of Mother - Alive   4  Family history of osteoporosis (V17 81) (Z82 62)  Father    5  Family history of Diabetes Mellitus (V18 0)   6  Family history of Family Health Status Of Father - Alive   7  Family history of Heart Disease (V17 49)   8  Family history of Prostate Cancer (V16 42)  Family History    9  Denied: Family history of colitis   10  Denied: Family history of Crohn's disease   6  Denied: Family history of psoriasis   12  Denied: Family history of rheumatoid arthritis   13  Denied: Family history of systemic lupus erythematosus    Social History    · Denied: History of Being A Social Drinker   · Caffeine Use   · Never a smoker   · Occasional alcohol use   · Denied: History of Tobacco Use   · Two children    Current Meds   1  Calcium 600 MG Oral Tablet; TAKE 1 TABLET DAILY; Therapy: 29Orp7404 to (Evaluate:21Aug2016); Last Rx:56Wwo1854 Ordered   2  Clindamycin HCl - 300 MG Oral Capsule; TAKE 2 CAPSULES 1 HOUR PRIOR TO   PROCEDURE; Therapy: 02Aug2017 to (Last Rx:02Aug2017)  Requested for: 94Unz3218 Ordered   3  GNP Calcium 600 +D TABS; Take 1 tablet daily; Therapy: (Recorded:04Jun2015) to Recorded   4  Ibuprofen 200 MG Oral Tablet; TAKE 2 TABLET Daily PRN pain; Therapy: (Dustin Puga) to Recorded   5  Omeprazole 40 MG Oral Capsule Delayed Release; TAKE 1 CAPSULE Daily  Requested   for: 23Apr2017; Last Rx:21Apr2017 Ordered   6  Premarin 0 625 MG/GM Vaginal Cream; INSERT 1 GRAM INTRAVAGINALLY  DAILY; Therapy: 21Apr2011 to (Evaluate:20Nov2017)  Requested for: 74Ftm1703; Last   Rx:72Kju1445 Ordered   7  Vitamin D 2000 UNIT Oral Tablet; Take 1 tablet daily;    Therapy: (515 334 909) to Recorded    Immunizations  Influenza --- Pedro Barron: 02-Feb-2017   Tdap --- Pedro Barron: 02-May-2013   Zoster --- Pedro Barron: 02-Feb-2017     Allergies    1  Penicillins    Vitals  Signs   Recorded: 31Aug2017 04:03PM   Heart Rate: 86  Systolic: 056  Diastolic: 70  Height: 5 ft 3 in  Weight: 132 lb   BMI Calculated: 23 38  BSA Calculated: 1 62    Results/Data  * DXA BONE DENSITY SPINE HIP AND PELVIS 01Aug2017 10:49AM Seb Rome    Order Number: [id number]   Performing Comments: History of OP with treatment on Forteo  Please evaluate  Thank you   - Patient Instructions: To schedule this appointment, please contact Central Scheduling at [phone]  Test Name Result Flag Reference   DXA BONE DENSITY SPINE HIP AND PELVIS (Report)     CENTRAL DXA SCAN     CLINICAL HISTORY:  60year old post-menopausal  female with history of use of antireflux medication  The patient walks and takes calcium and vitamin D supplements  The patient takes Forteo and took [place] and Reclast in the past  There is a    stated loss in height of 2 inches  TECHNIQUE: Bone densitometry was performed using a Hologic Horizon A bone densitometer  Regions of interest appear properly placed  There are degenerative changes of the lumbar spine, which can artificially elevate bone mineral density results  The BMD   of L1 and L2 are elevated, compared to the BMD of L3 and L4 and L1 and L2 were excluded from calculation of lumbar spine BMD         COMPARISON: The patient had a prior study at a different site  Studies performed on different pieces of equipment cannot be accurately compared without cross-calibration of the equipment  Thus, this is considered a new baseline study  RESULTS:    LUMBAR SPINE: L3-L4:   BMD 0 740 gm/cm2   T-score -3 3   Z-score -1 6     LEFT TOTAL HIP:   BMD 0 788 gm/cm2   T-score -1 3   Z-score -0 1     LEFT FEMORAL NECK:   BMD 0 679 gm/cm2   T-score -1 5   Z-score -0 1             IMPRESSION:   1  Based on the [facility] classification, the T-score of -3 3 in the lumbar spine is consistent with osteoporosis     2  As the patient's prior study was performed on a different piece of equipment, accurate comparison cannot be performed with the current study  Thus, I cannot determine if the patient's bone mineral density is stable, improved on therapy or whether she   continues to lose bone  In cases such as this, serum and/or urinary markers of bone resorption can be of benefit to evaluate for any active bone loss  3  With the stated loss in height of 2 inches, consideration to obtaining thoraco-lumbar spine films may be helpful to exclude silent vertebral fractures, which can increase the risk for future fracture  4  A daily intake of at least 1200 mg Calcium and 800 to 1000 IU of Vitamin D, as well as weight bearing and muscle strengthening exercise, fall prevention and avoidance of tobacco and excessive alcohol intake as basic preventive measures are    suggested  5  Repeat DXA in 18 - 24 months, on the same machine, as clinically indicated  WHO CLASSIFICATION:   Normal (a T-score of -1 0 or higher)   Low bone mineral density (a T-score of less than -1 0 but higher than -2 5)   Osteoporosis (a T-score of -2 5 or less)   Severe osteoporosis (a T-score of -2 5 or less with a fragility fracture)             Workstation performed: [id number]     Signed by:   Dina Carlisle MD   8/1/17       Health Management  History of Encounter for screening colonoscopy   COLONOSCOPY; every 10 years; Last 33TTR7254; Next Due: 87FVV3474; Active    Attending Note  Collaborating Physician: I did not interview and examine the patient, I discussed the case with the Advanced Practitioner and reviewed the note, I did not supervise the procedure performed by the Advanced Practitioner and I agree with the Advanced Practitioner note  Future Appointments    Date/Time Provider Specialty Site   02/28/2018 03:00 PM Erasto Rogers, [facility]     Signatures   Electronically signed by : Farida Marquez, [facility];  Aug 31 2017 4:10PM EST                       (Author)    Electronically signed by : Jessica Aquino DO; Aug 31 2017  4:33PM EST                       (Author)

## 2018-01-22 VITALS
BODY MASS INDEX: 23.49 KG/M2 | DIASTOLIC BLOOD PRESSURE: 66 MMHG | SYSTOLIC BLOOD PRESSURE: 110 MMHG | WEIGHT: 141 LBS | HEART RATE: 88 BPM | HEIGHT: 65 IN

## 2018-01-22 VITALS
WEIGHT: 141 LBS | DIASTOLIC BLOOD PRESSURE: 86 MMHG | SYSTOLIC BLOOD PRESSURE: 132 MMHG | BODY MASS INDEX: 22.9 KG/M2 | HEART RATE: 100 BPM

## 2018-01-22 VITALS
RESPIRATION RATE: 16 BRPM | SYSTOLIC BLOOD PRESSURE: 112 MMHG | DIASTOLIC BLOOD PRESSURE: 70 MMHG | BODY MASS INDEX: 22.16 KG/M2 | HEIGHT: 65 IN | HEART RATE: 80 BPM | WEIGHT: 133 LBS

## 2018-01-22 VITALS
SYSTOLIC BLOOD PRESSURE: 122 MMHG | BODY MASS INDEX: 23.39 KG/M2 | DIASTOLIC BLOOD PRESSURE: 70 MMHG | WEIGHT: 132 LBS | HEART RATE: 86 BPM | HEIGHT: 63 IN

## 2018-01-23 VITALS
DIASTOLIC BLOOD PRESSURE: 86 MMHG | OXYGEN SATURATION: 100 % | TEMPERATURE: 98.4 F | RESPIRATION RATE: 18 BRPM | SYSTOLIC BLOOD PRESSURE: 142 MMHG | HEART RATE: 85 BPM

## 2018-01-24 DIAGNOSIS — K21.9 GASTROESOPHAGEAL REFLUX DISEASE WITHOUT ESOPHAGITIS: Primary | ICD-10-CM

## 2018-01-24 RX ORDER — PANTOPRAZOLE SODIUM 40 MG/1
40 TABLET, DELAYED RELEASE ORAL DAILY
Qty: 30 TABLET | Refills: 2 | Status: SHIPPED | OUTPATIENT
Start: 2018-01-24 | End: 2018-04-12 | Stop reason: SDUPTHER

## 2018-01-24 NOTE — TELEPHONE ENCOUNTER
Patient called   Would like to try the Pantoprazole 40mg that is now on her formulary  Prilosec is not working   If ok, please order new medication to [facility]

## 2018-02-01 ENCOUNTER — HOSPITAL ENCOUNTER (OUTPATIENT)
Dept: RADIOLOGY | Facility: MEDICAL CENTER | Age: 63
Discharge: HOME | End: 2018-02-01
Payer: COMMERCIAL

## 2018-02-01 DIAGNOSIS — Z12.31 ENCOUNTER FOR SCREENING MAMMOGRAM FOR HIGH-RISK PATIENT: ICD-10-CM

## 2018-02-01 PROCEDURE — 77067 SCR MAMMO BI INCL CAD: CPT

## 2018-02-14 ENCOUNTER — TELEPHONE (OUTPATIENT)
Dept: FAMILY MEDICINE CLINIC | Facility: MEDICAL CENTER | Age: 63
End: 2018-02-14

## 2018-02-14 NOTE — TELEPHONE ENCOUNTER
Patient left a vm asking to refill her venlafaxine  She has been off of it for a while but feels she needs to restart it, do you want to see her?

## 2018-02-15 ENCOUNTER — TRANSCRIBE ORDERS (OUTPATIENT)
Dept: ADMINISTRATIVE | Facility: HOSPITAL | Age: 63
End: 2018-02-15

## 2018-02-15 DIAGNOSIS — Z12.31 ENCOUNTER FOR SCREENING MAMMOGRAM FOR MALIGNANT NEOPLASM OF BREAST: Primary | ICD-10-CM

## 2018-02-15 DIAGNOSIS — F43.22 ADJUSTMENT DISORDER WITH ANXIETY: Primary | ICD-10-CM

## 2018-02-15 RX ORDER — VENLAFAXINE HYDROCHLORIDE 75 MG/1
75 CAPSULE, EXTENDED RELEASE ORAL DAILY
Qty: 30 CAPSULE | Refills: 5 | Status: SHIPPED | OUTPATIENT
Start: 2018-02-15 | End: 2018-05-07 | Stop reason: SDUPTHER

## 2018-03-16 ENCOUNTER — OFFICE VISIT (OUTPATIENT)
Dept: RHEUMATOLOGY | Facility: CLINIC | Age: 63
End: 2018-03-16
Payer: COMMERCIAL

## 2018-03-16 VITALS
HEIGHT: 65 IN | HEART RATE: 80 BPM | WEIGHT: 135 LBS | SYSTOLIC BLOOD PRESSURE: 122 MMHG | BODY MASS INDEX: 22.49 KG/M2 | DIASTOLIC BLOOD PRESSURE: 66 MMHG

## 2018-03-16 DIAGNOSIS — M81.8 OTHER OSTEOPOROSIS WITHOUT CURRENT PATHOLOGICAL FRACTURE: Primary | ICD-10-CM

## 2018-03-16 PROCEDURE — 96372 THER/PROPH/DIAG INJ SC/IM: CPT | Performed by: PHYSICIAN ASSISTANT

## 2018-03-16 PROCEDURE — 99212 OFFICE O/P EST SF 10 MIN: CPT | Performed by: PHYSICIAN ASSISTANT

## 2018-03-16 RX ORDER — IBUPROFEN 600 MG/1
1 TABLET ORAL DAILY PRN
Refills: 3 | COMMUNITY
Start: 2018-03-02 | End: 2019-08-16 | Stop reason: ALTCHOICE

## 2018-03-16 RX ORDER — MULTIVIT-MIN/IRON/FOLIC ACID/K 18-600-40
1 CAPSULE ORAL DAILY
COMMUNITY

## 2018-03-16 RX ORDER — IBUPROFEN 200 MG
1 CAPSULE ORAL DAILY
COMMUNITY
Start: 2016-07-22

## 2018-03-16 NOTE — LETTER
March 16, 2018     Patient: Carolyn Albarado   YOB: 1955   Date of Visit: 3/16/2018       To Whom it May Concern:    Lucie Vasquez is under my professional care  She was seen in my office on 3/16/2018  She may return to work on 3/19/2018       If you have any questions or concerns, please don't hesitate to call           Sincerely,          Cj Harmon PA-C        CC: No Recipients

## 2018-03-16 NOTE — PROGRESS NOTES
Plan:   1  Osteoporosis (733 00) (M81 0)    Patient was seen in the office today for her first Prolia injection  Prolia 60 mg was injected subcutaneously into her left upper extremity  Patient tolerated the procedure left without complication  Pt encouraged to perform regular weight bearing exercise, continue calcium and Vitamin D supplementation  Patient will plan to return to the office in 6 months time for her next injection however contact the office in the interim if she has any further questions or concerns  Diagnoses and all orders for this visit:    Other osteoporosis without current pathological fracture    Other orders  -     Cholecalciferol (VITAMIN D) 2000 units CAPS; Take 1 tablet by mouth daily  -     ibuprofen (MOTRIN) 600 mg tablet; Take 1 tablet by mouth daily as needed  -     Calcium 600 MG tablet; Take 1 tablet by mouth daily      Subjective:      Patient ID: Denisha Randle is a 60 y o   female    HPI:    Patient presents today for Prolia injection  This is her second injection  Doing well since last visit, tolerated first one well  No changes in symptoms  Labs and Vitamin D level have been stable and in normal range  Patient denies any other changes to her medication, past medical history and surgical history  RAPID 3 Score: 0 7/30      The following portions of the patient's history were reviewed and updated as appropriate:   She  has no past medical history on file    She   Patient Active Problem List    Diagnosis Date Noted    Vitamin D deficiency 08/04/2016    Primary generalized (osteo)arthritis 01/22/2016    Degenerative lumbar disc 05/29/2015    Lumbar arthropathy ([facility] [address] ) 05/29/2015    Compression fracture of spine ([facility] [address] ) 05/14/2015    Bilateral ovarian cysts 09/08/2014    Mammogram abnormal 01/15/2014    Adjustment disorder with anxiety 10/24/2013    Insomnia 05/02/2013    Dysfunction of eustachian tube 04/08/2013    Esophageal reflux 03/21/2013    Hyperlipidemia 03/21/2013    Osteoporosis 03/21/2013    Sacroiliitis ([address] ) 08/06/2012     She  has no past surgical history on file  Her family history is not on file  She  reports that she has never smoked  She has never used smokeless tobacco  She reports that she drinks alcohol  She reports that she does not use drugs  Current Outpatient Prescriptions   Medication Sig Dispense Refill    Calcium 600 MG tablet Take 1 tablet by mouth daily      Cholecalciferol (VITAMIN D) 2000 units CAPS Take 1 tablet by mouth daily      ibuprofen (MOTRIN) 600 mg tablet Take 1 tablet by mouth daily as needed  3    pantoprazole (PROTONIX) 40 mg tablet Take 1 tablet by mouth daily 30 tablet 2    venlafaxine (EFFEXOR-XR) 75 mg 24 hr capsule Take 1 capsule (75 mg total) by mouth daily 30 capsule 5     No current facility-administered medications for this visit  Current Outpatient Prescriptions on File Prior to Visit   Medication Sig    pantoprazole (PROTONIX) 40 mg tablet Take 1 tablet by mouth daily    venlafaxine (EFFEXOR-XR) 75 mg 24 hr capsule Take 1 capsule (75 mg total) by mouth daily     No current facility-administered medications on file prior to visit  She is allergic to penicillins     Review of Systems    Objective:    Physical Exam   Constitutional: She is oriented to person, place, and time  She appears well-developed and well-nourished  No distress  HENT:   Head: Normocephalic and atraumatic  Eyes: Conjunctivae are normal    Neck: No tracheal deviation present  Pulmonary/Chest: No stridor  No respiratory distress  She has no wheezes  Neurological: She is alert and oriented to person, place, and time  Skin: Skin is warm and dry  No erythema  Psychiatric: She has a normal mood and affect       Vitals:    03/16/18 0902   BP: 122/66   Pulse: 80         Physical Exam

## 2018-04-12 DIAGNOSIS — K21.9 GASTROESOPHAGEAL REFLUX DISEASE WITHOUT ESOPHAGITIS: ICD-10-CM

## 2018-04-13 ENCOUNTER — APPOINTMENT (EMERGENCY)
Dept: CT IMAGING | Facility: HOSPITAL | Age: 63
End: 2018-04-13
Payer: COMMERCIAL

## 2018-04-13 ENCOUNTER — HOSPITAL ENCOUNTER (EMERGENCY)
Facility: HOSPITAL | Age: 63
Discharge: HOME | End: 2018-04-13
Attending: EMERGENCY MEDICINE
Payer: COMMERCIAL

## 2018-04-13 VITALS
OXYGEN SATURATION: 100 % | DIASTOLIC BLOOD PRESSURE: 72 MMHG | WEIGHT: 130 LBS | HEIGHT: 64 IN | RESPIRATION RATE: 16 BRPM | HEART RATE: 89 BPM | TEMPERATURE: 98 F | SYSTOLIC BLOOD PRESSURE: 153 MMHG | BODY MASS INDEX: 22.2 KG/M2

## 2018-04-13 DIAGNOSIS — K21.9 HIATAL HERNIA WITH GERD: ICD-10-CM
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
## 2018-04-13 DIAGNOSIS — S22.000A COMPRESSION FRACTURE OF BODY OF THORACIC VERTEBRA (HCC): ICD-10-CM

## 2018-04-13 DIAGNOSIS — T14.8XXA MUSCLE CONTUSION: ICD-10-CM

## 2018-04-13 DIAGNOSIS — N83.209 OVARIAN CYST: ICD-10-CM

## 2018-04-13 DIAGNOSIS — K44.9 HIATAL HERNIA WITH GERD: ICD-10-CM

## 2018-04-13 LAB
BACTERIA UR QL AUTO: ABNORMAL /HPF
BILIRUB UR QL STRIP: NEGATIVE
CLARITY UR: CLEAR
COLOR UR: YELLOW
GLUCOSE UR STRIP-MCNC: NEGATIVE MG/DL
HGB UR QL STRIP.AUTO: ABNORMAL
KETONES UR STRIP-MCNC: NEGATIVE MG/DL
LEUKOCYTE ESTERASE UR QL STRIP: NEGATIVE
NITRITE UR QL STRIP: NEGATIVE
NON-SQ EPI CELLS URNS QL MICRO: ABNORMAL /HPF
PH UR STRIP.AUTO: 7.5 [PH] (ref 4.5–8)
PROT UR STRIP-MCNC: NEGATIVE MG/DL
RBC #/AREA URNS AUTO: ABNORMAL /HPF
SP GR UR STRIP.AUTO: 1.02 (ref 1–1.03)
UROBILINOGEN UR QL STRIP.AUTO: 0.2 E.U./DL
WBC #/AREA URNS AUTO: ABNORMAL /HPF

## 2018-04-13 PROCEDURE — 81001 URINALYSIS AUTO W/SCOPE: CPT

## 2018-04-13 PROCEDURE — 71250 CT THORAX DX C-: CPT

## 2018-04-13 PROCEDURE — 99284 EMERGENCY DEPT VISIT MOD MDM: CPT

## 2018-04-13 PROCEDURE — 74176 CT ABD & PELVIS W/O CONTRAST: CPT

## 2018-04-13 RX ORDER — CYCLOBENZAPRINE HCL 5 MG
10 TABLET ORAL 3 TIMES DAILY PRN
Qty: 30 TABLET | Refills: 0 | Status: SHIPPED | OUTPATIENT
Start: 2018-04-13 | End: 2018-04-19 | Stop reason: SDUPTHER

## 2018-04-13 RX ORDER — IBUPROFEN 600 MG/1
600 TABLET ORAL ONCE
Status: COMPLETED | OUTPATIENT
Start: 2018-04-13 | End: 2018-04-13

## 2018-04-13 RX ORDER — PANTOPRAZOLE SODIUM 40 MG/1
40 TABLET, DELAYED RELEASE ORAL DAILY
Qty: 90 TABLET | Refills: 1 | Status: SHIPPED | OUTPATIENT
Start: 2018-04-13 | End: 2018-10-05 | Stop reason: SDUPTHER

## 2018-04-13 RX ADMIN — IBUPROFEN 600 MG: 600 TABLET ORAL at 09:31

## 2018-04-13 NOTE — DISCHARGE INSTRUCTIONS
Vertebral Compression Fracture   WHAT YOU NEED TO KNOW:   A vertebral compression fracture (VCF) is a break in a part of the vertebra  Vertebrae are the round, strong bones that form your spine  VCFs most often occur in the thoracic (middle) and lumbar (lower) areas of your spine  Fractures may be mild to severe  DISCHARGE INSTRUCTIONS:   Medicines: You may need any of the following:  · NSAIDs , such as ibuprofen, help decrease swelling, pain, and fever  This medicine is available with or without a doctor's order  NSAIDs can cause stomach bleeding or kidney problems in certain people  If you take blood thinner medicine, always ask if NSAIDs are safe for you  Always read the medicine label and follow directions  Do not give these medicines to children under 10months of age without direction from your child's healthcare provider  · Acetaminophen  decreases pain and fever  It is available without a doctor's order  Ask how much to take and how often to take it  Follow directions  Acetaminophen can cause liver damage if not taken correctly  · Prescription pain medicine  may be given  Ask your healthcare provider how to take this medicine safely  · Bisphosphonates and calcitonin  may be recommended to help your bones get stronger  They can decrease the pain of a VCF caused by osteoporosis, and decrease your risk for another fracture  · Take your medicine as directed  Contact your healthcare provider if you think your medicine is not helping or if you have side effects  Tell him or her if you are allergic to any medicine  Keep a list of the medicines, vitamins, and herbs you take  Include the amounts, and when and why you take them  Bring the list or the pill bottles to follow-up visits  Carry your medicine list with you in case of an emergency  Follow up with your healthcare provider as directed: You may need to return for x-rays or other tests   Write down your questions so you remember to ask them during your visits  Heat and ice:   · Apply ice  on your back for 15 to 20 minutes every hour or as directed  Use an ice pack, or put crushed ice in a plastic bag  Cover it with a towel  Ice helps prevent tissue damage and decreases swelling and pain  · Apply heat  on your back for 20 to 30 minutes every 2 hours for as many days as directed  Heat helps decrease pain and muscle spasms  Activity:   · Avoid activities that may make the pain worse, such as picking up heavy objects  When the pain decreases, begin normal, slow movements as directed by your healthcare provider  Your healthcare provider may have you do weight-bearing exercises such as walking  You may also do non-weight-bearing exercises such as swimming and bicycling  · You may need to use a walker or cane  Ask your healthcare provider for more information about how to use a cane or a walker  · When you  objects, bend at the hips and knees  Never bend from the waist only  Use bent knees and your leg muscles as you lift the object  While you lift the object, keep it close to your chest  Try not to twist or lift anything above your waist   Physical and occupational therapy:  Your healthcare provider may recommend physical and occupational therapy  A physical therapist teaches you exercises to help improve movement and strength, and to decrease pain  An occupational therapist teaches you skills to help with your daily activities  Manage pain during sleep:   · Do not sleep on a waterbed  Waterbeds do not provide good back support  · Sleep on a firm mattress  You may also put a ½ to 1-inch piece of plywood between the mattress and box spring  · Sleep on your back with a pillow under your knees  This will decrease pressure on your back  You may also sleep on your side with 1 or both of your knees bent and a pillow between them  It may also be helpful to sleep on your stomach with a pillow under you at waist level    Contact your healthcare provider if:   · You are not hungry, and you are losing weight  · You cannot sleep or rest because of back pain  · You have pain or swelling in your back that is getting worse, or does not go away  · You have questions or concerns about your condition or care  Return to the emergency department if:   · You feel lightheaded, short of breath, and have chest pain  · You cough up blood  · Your arm or leg feels warm, tender, and painful  It may look swollen and red  · You have new problems urinating or having bowel movements  · You have severe pain in your back after falling, bending forward, sneezing, or coughing strongly  · You suddenly cannot feel your legs  · You suddenly have trouble moving your arms or legs  © 2017 [address] Information is for End User's use only and may not be sold, redistributed or otherwise used for commercial purposes  All illustrations and images included in CareNotes® are the copyrighted property of [employer]  or [employer]  The above information is an  only  It is not intended as medical advice for individual conditions or treatments  Talk to your doctor, nurse or pharmacist before following any medical regimen to see if it is safe and effective for you  Contusion in Adults   WHAT YOU NEED TO KNOW:   A contusion is a bruise that appears on your skin after an injury  A bruise happens when small blood vessels tear but skin does not  When blood vessels tear, blood leaks into nearby tissue, such as soft tissue or muscle  DISCHARGE INSTRUCTIONS:   Return to the emergency department if:   · You have new trouble moving the injured area  · You have tingling or numbness in or near the injured area  · Your hand or foot below the bruise gets cold or turns pale  Contact your healthcare provider if:   · You find a new lump in the injured area      · Your symptoms do not improve with treatment after 4 to 5 days  · You have questions or concerns about your condition or care  Medicines: You may need any of the following:  · NSAIDs  help decrease swelling and pain or fever  This medicine is available with or without a doctor's order  NSAIDs can cause stomach bleeding or kidney problems in certain people  If you take blood thinner medicine, always ask your healthcare provider if NSAIDs are safe for you  Always read the medicine label and follow directions  · Prescription pain medicine  may be given  Do not wait until the pain is severe before you take your medicine  · Take your medicine as directed  Contact your healthcare provider if you think your medicine is not helping or if you have side effects  Tell him of her if you are allergic to any medicine  Keep a list of the medicines, vitamins, and herbs you take  Include the amounts, and when and why you take them  Bring the list or the pill bottles to follow-up visits  Carry your medicine list with you in case of an emergency  Follow up with your healthcare provider as directed: You may need to return within a week to check your injury again  Write down your questions so you remember to ask them during your visits  Help a contusion heal:   · Rest the injured area  or use it less than usual  If you bruised your leg or foot, you may need crutches or a cane to help you walk  This will help you keep weight off your injured body part  · Apply ice  to decrease swelling and pain  Ice may also help prevent tissue damage  Use an ice pack, or put crushed ice in a plastic bag  Cover it with a towel and place it on your bruise for 15 to 20 minutes every hour or as directed  · Use compression  to support the area and decrease swelling  Wrap an elastic bandage around the area over the bruised muscle  Make sure the bandage is not too tight  You should be able to fit 1 finger between the bandage and your skin      · Elevate (raise) your injured body part  above the level of your heart to help decrease pain and swelling  Use pillows, blankets, or rolled towels to elevate the area as often as you can  · Do not drink alcohol  as directed  Alcohol may slow healing  · Do not stretch injured muscles  right after your injury  Ask your healthcare provider when and how you may safely stretch after your injury  Gentle stretches can help increase your flexibility  · Do not massage the area or put heating pads  on the bruise right after your injury  Heat and massage may slow healing  Your healthcare provider may tell you to apply heat after several days  At that time, heat will start to help the injury heal   Prevent another contusion:   · Stretch and warm up before you play sports or exercise  · Wear protective gear when you play sports  Examples are shin guards and padding  · If you begin a new physical activity, start slowly to give your body a chance to adjust   © 2017 [address] Information is for End User's use only and may not be sold, redistributed or otherwise used for commercial purposes  All illustrations and images included in CareNotes® are the copyrighted property of [employer]  or Jason Davis  The above information is an  only  It is not intended as medical advice for individual conditions or treatments  Talk to your doctor, nurse or pharmacist before following any medical regimen to see if it is safe and effective for you
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
## 2018-04-19 ENCOUNTER — OFFICE VISIT (OUTPATIENT)
Dept: FAMILY MEDICINE CLINIC | Facility: MEDICAL CENTER | Age: 63
End: 2018-04-19
Payer: COMMERCIAL

## 2018-04-19 VITALS
SYSTOLIC BLOOD PRESSURE: 142 MMHG | WEIGHT: 136.8 LBS | BODY MASS INDEX: 23.48 KG/M2 | HEART RATE: 76 BPM | RESPIRATION RATE: 16 BRPM | DIASTOLIC BLOOD PRESSURE: 60 MMHG

## 2018-04-19 DIAGNOSIS — N83.202 CYST OF LEFT OVARY: ICD-10-CM

## 2018-04-19 DIAGNOSIS — T14.8XXA MUSCLE CONTUSION: Primary | ICD-10-CM
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
## 2018-04-19 PROCEDURE — 99213 OFFICE O/P EST LOW 20 MIN: CPT | Performed by: FAMILY MEDICINE

## 2018-04-19 RX ORDER — CYCLOBENZAPRINE HCL 5 MG
5 TABLET ORAL 3 TIMES DAILY PRN
Qty: 30 TABLET | Refills: 0 | Status: SHIPPED | OUTPATIENT
Start: 2018-04-19 | End: 2018-05-07 | Stop reason: SDUPTHER
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
## 2018-05-07 DIAGNOSIS — F43.22 ADJUSTMENT DISORDER WITH ANXIETY: ICD-10-CM
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
## 2018-05-07 RX ORDER — CYCLOBENZAPRINE HCL 5 MG
5 TABLET ORAL 3 TIMES DAILY PRN
Qty: 90 TABLET | Refills: 0 | Status: SHIPPED | OUTPATIENT
Start: 2018-05-07 | End: 2018-06-03 | Stop reason: SDUPTHER

## 2018-05-07 RX ORDER — VENLAFAXINE HYDROCHLORIDE 75 MG/1
75 CAPSULE, EXTENDED RELEASE ORAL DAILY
Qty: 90 CAPSULE | Refills: 0 | Status: SHIPPED | OUTPATIENT
Start: 2018-05-07 | End: 2018-09-13 | Stop reason: ALTCHOICE

## 2018-05-10 ENCOUNTER — HOSPITAL ENCOUNTER (OUTPATIENT)
Dept: RADIOLOGY | Facility: MEDICAL CENTER | Age: 63
Discharge: HOME | End: 2018-05-10
Payer: COMMERCIAL

## 2018-05-10 DIAGNOSIS — N83.202 CYST OF LEFT OVARY: ICD-10-CM

## 2018-05-10 PROCEDURE — 76830 TRANSVAGINAL US NON-OB: CPT

## 2018-05-10 PROCEDURE — 76856 US EXAM PELVIC COMPLETE: CPT

## 2018-05-17 ENCOUNTER — TELEPHONE (OUTPATIENT)
Dept: FAMILY MEDICINE CLINIC | Facility: MEDICAL CENTER | Age: 63
End: 2018-05-17

## 2018-05-20 ENCOUNTER — OFFICE VISIT (OUTPATIENT)
Dept: URGENT CARE | Facility: MEDICAL CENTER | Age: 63
End: 2018-05-20
Payer: COMMERCIAL

## 2018-05-20 ENCOUNTER — APPOINTMENT (OUTPATIENT)
Dept: RADIOLOGY | Facility: MEDICAL CENTER | Age: 63
End: 2018-05-20
Payer: COMMERCIAL

## 2018-05-20 VITALS
OXYGEN SATURATION: 100 % | HEART RATE: 101 BPM | TEMPERATURE: 100.3 F | RESPIRATION RATE: 20 BRPM | WEIGHT: 130 LBS | BODY MASS INDEX: 22.2 KG/M2 | SYSTOLIC BLOOD PRESSURE: 140 MMHG | HEIGHT: 64 IN | DIASTOLIC BLOOD PRESSURE: 80 MMHG

## 2018-05-20 DIAGNOSIS — R07.81 RIB PAIN ON LEFT SIDE: Primary | ICD-10-CM

## 2018-05-20 DIAGNOSIS — R07.81 RIB PAIN ON LEFT SIDE: ICD-10-CM

## 2018-05-20 PROCEDURE — 71101 X-RAY EXAM UNILAT RIBS/CHEST: CPT

## 2018-05-20 PROCEDURE — 99213 OFFICE O/P EST LOW 20 MIN: CPT | Performed by: PHYSICIAN ASSISTANT
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
## 2018-05-21 ENCOUNTER — TELEPHONE (OUTPATIENT)
Dept: FAMILY MEDICINE CLINIC | Facility: MEDICAL CENTER | Age: 63
End: 2018-05-21

## 2018-05-21 NOTE — TELEPHONE ENCOUNTER
----- Message from Nahed Stoddard MD sent at 5/20/2018  2:08 PM EDT -----  Notify pelvic US shows resolved ovarian cyst
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
## 2018-06-03 RX ORDER — CYCLOBENZAPRINE HCL 5 MG
TABLET ORAL
Qty: 90 TABLET | Refills: 0 | Status: SHIPPED | OUTPATIENT
Start: 2018-06-03 | End: 2018-06-30 | Stop reason: SDUPTHER
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
## 2018-07-01 RX ORDER — CYCLOBENZAPRINE HCL 5 MG
TABLET ORAL
Qty: 90 TABLET | Refills: 0 | Status: SHIPPED | OUTPATIENT
Start: 2018-07-01 | End: 2018-08-19 | Stop reason: SDUPTHER

## 2018-07-05 ENCOUNTER — HOSPITAL ENCOUNTER (OUTPATIENT)
Dept: CT IMAGING | Facility: HOSPITAL | Age: 63
Discharge: HOME | End: 2018-07-05
Payer: COMMERCIAL

## 2018-07-05 ENCOUNTER — TRANSCRIBE ORDERS (OUTPATIENT)
Dept: ADMINISTRATIVE | Facility: HOSPITAL | Age: 63
End: 2018-07-05

## 2018-07-05 ENCOUNTER — TELEPHONE (OUTPATIENT)
Dept: FAMILY MEDICINE CLINIC | Facility: MEDICAL CENTER | Age: 63
End: 2018-07-05

## 2018-07-05 ENCOUNTER — OFFICE VISIT (OUTPATIENT)
Dept: FAMILY MEDICINE CLINIC | Facility: MEDICAL CENTER | Age: 63
End: 2018-07-05
Payer: COMMERCIAL

## 2018-07-05 VITALS
DIASTOLIC BLOOD PRESSURE: 64 MMHG | TEMPERATURE: 98.7 F | RESPIRATION RATE: 16 BRPM | BODY MASS INDEX: 24.58 KG/M2 | HEART RATE: 72 BPM | WEIGHT: 143.2 LBS | SYSTOLIC BLOOD PRESSURE: 110 MMHG

## 2018-07-05 DIAGNOSIS — R10.31 RIGHT LOWER QUADRANT ABDOMINAL PAIN: ICD-10-CM

## 2018-07-05 DIAGNOSIS — R10.31 RIGHT LOWER QUADRANT ABDOMINAL PAIN: Primary | ICD-10-CM

## 2018-07-05 LAB
SL AMB  POCT GLUCOSE, UA: NORMAL
SL AMB LEUKOCYTE ESTERASE,UA: NORMAL
SL AMB POCT BILIRUBIN,UA: NORMAL
SL AMB POCT BLOOD,UA: NORMAL
SL AMB POCT CLARITY,UA: CLEAR
SL AMB POCT COLOR,UA: YELLOW
SL AMB POCT KETONES,UA: NORMAL
SL AMB POCT NITRITE,UA: NORMAL
SL AMB POCT PH,UA: 6
SL AMB POCT SPECIFIC GRAVITY,UA: 1.01
SL AMB POCT URINE PROTEIN: NORMAL
SL AMB POCT UROBILINOGEN: 0.2

## 2018-07-05 PROCEDURE — 99213 OFFICE O/P EST LOW 20 MIN: CPT | Performed by: FAMILY MEDICINE

## 2018-07-05 PROCEDURE — 81002 URINALYSIS NONAUTO W/O SCOPE: CPT | Performed by: FAMILY MEDICINE

## 2018-07-05 PROCEDURE — 74177 CT ABD & PELVIS W/CONTRAST: CPT

## 2018-07-05 RX ADMIN — IOHEXOL 85 ML: 350 INJECTION, SOLUTION INTRAVENOUS at 18:41

## 2018-07-05 RX ADMIN — IOHEXOL 50 ML: 240 INJECTION, SOLUTION INTRATHECAL; INTRAVASCULAR; INTRAVENOUS; ORAL at 18:41

## 2018-07-05 NOTE — TELEPHONE ENCOUNTER
ARMINDAI: She says the pain is keeping her awake at night  Movement seems to make it worse, not sure if she didn't just pull something in that area  No other symptoms, she is at work  Given appt  At 3:45 today

## 2018-07-05 NOTE — TELEPHONE ENCOUNTER
Pt left a voice mail that shes been having right lower abdominal pain since Monday and its getting worse
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
## 2018-07-06 ENCOUNTER — HOSPITAL ENCOUNTER (EMERGENCY)
Facility: HOSPITAL | Age: 63
Discharge: HOME | End: 2018-07-06
Attending: EMERGENCY MEDICINE | Admitting: EMERGENCY MEDICINE
Payer: COMMERCIAL

## 2018-07-06 VITALS
OXYGEN SATURATION: 99 % | RESPIRATION RATE: 16 BRPM | DIASTOLIC BLOOD PRESSURE: 79 MMHG | TEMPERATURE: 98.9 F | HEART RATE: 75 BPM | BODY MASS INDEX: 24.41 KG/M2 | SYSTOLIC BLOOD PRESSURE: 146 MMHG | WEIGHT: 142.2 LBS

## 2018-07-06 DIAGNOSIS — S76.011A STRAIN OF FLEXOR MUSCLE OF RIGHT HIP, INITIAL ENCOUNTER: Primary | ICD-10-CM

## 2018-07-06 PROCEDURE — 96372 THER/PROPH/DIAG INJ SC/IM: CPT

## 2018-07-06 PROCEDURE — 99283 EMERGENCY DEPT VISIT LOW MDM: CPT

## 2018-07-06 RX ORDER — METHOCARBAMOL 500 MG/1
1000 TABLET, FILM COATED ORAL ONCE
Status: COMPLETED | OUTPATIENT
Start: 2018-07-06 | End: 2018-07-06

## 2018-07-06 RX ORDER — KETOROLAC TROMETHAMINE 30 MG/ML
30 INJECTION, SOLUTION INTRAMUSCULAR; INTRAVENOUS ONCE
Status: COMPLETED | OUTPATIENT
Start: 2018-07-06 | End: 2018-07-06

## 2018-07-06 RX ORDER — NAPROXEN 500 MG/1
500 TABLET ORAL 2 TIMES DAILY WITH MEALS
Qty: 20 TABLET | Refills: 0 | Status: SHIPPED | OUTPATIENT
Start: 2018-07-06 | End: 2018-09-13 | Stop reason: ALTCHOICE

## 2018-07-06 RX ORDER — METHOCARBAMOL 500 MG/1
1000 TABLET, FILM COATED ORAL 3 TIMES DAILY PRN
Qty: 40 TABLET | Refills: 0 | Status: SHIPPED | OUTPATIENT
Start: 2018-07-06 | End: 2018-09-13 | Stop reason: ALTCHOICE

## 2018-07-06 RX ADMIN — METHOCARBAMOL 1000 MG: 500 TABLET ORAL at 18:13

## 2018-07-06 RX ADMIN — KETOROLAC TROMETHAMINE 30 MG: 30 INJECTION, SOLUTION INTRAMUSCULAR at 18:13

## 2018-07-06 NOTE — TELEPHONE ENCOUNTER
Pt aware- Hold for her call Friday    Per Dr Iwona Tenorio , ct scan looks normal  Apply heat to area and take ibuprofen  Call us tomorrow around 10 am with an update , then we need to mack Dr Iwona Tenorio for further instructions

## 2018-07-06 NOTE — TELEPHONE ENCOUNTER
santino-   Dr Jeyson Patel will see her at 12:30   Will finish at the hospital and meet her at the office  Called patient   She has no car and can not make the appt   at work and can not leave  I advised she go to the Er then when her  gets home to take her as advised by Dr Stas Bass
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
## 2018-07-06 NOTE — TELEPHONE ENCOUNTER
S/w patient, same today  Still with significant RLQ pain  Per Dr Sherry Feldman see if we can get her in with Alicia Campbell and Valentino Pina office  Spoke with Salo Hughes, she is paging Dr Valentino Pina to see if he will come into the office and see her

## 2018-07-06 NOTE — DISCHARGE INSTRUCTIONS
Muscle Strain   WHAT YOU NEED TO KNOW:   A muscle strain is a twist, pull, or tear of a muscle or tendon  A tendon is a strong elastic tissue that connects a muscle to a bone  Signs of a strained muscle include bruising and swelling over the area, pain with movement, and loss of strength  DISCHARGE INSTRUCTIONS:   Return to the emergency department if:   · You suddenly cannot feel or move your injured muscle  Contact your healthcare provider if:   · Your pain and swelling worsen or do not go away  · You have questions or concerns about your condition or care  Medicines:   · NSAIDs  help decrease swelling and pain or fever  This medicine is available with or without a doctor's order  NSAIDs can cause stomach bleeding or kidney problems in certain people  If you take blood thinner medicine, always ask your healthcare provider if NSAIDs are safe for you  Always read the medicine label and follow directions  · Muscle relaxers  help decrease pain and muscle spasms  · Take your medicine as directed  Contact your healthcare provider if you think your medicine is not helping or if you have side effects  Tell him of her if you are allergic to any medicine  Keep a list of the medicines, vitamins, and herbs you take  Include the amounts, and when and why you take them  Bring the list or the pill bottles to follow-up visits  Carry your medicine list with you in case of an emergency  Follow up with your healthcare provider as directed: Your healthcare provider may suggest that you have a follow-up visit before you go back to your usual activity  Write down your questions so you remember to ask them during your visits  Self-care:   · 3 to 7 days after the injury:  Use Rest, Ice, Compression, and Elevation (RICE) to help stop bruising and decrease pain and swelling  ¨ Rest:  Rest your muscle to allow your injury to heal  When the pain decreases, begin normal, slow movements   For mild and moderate muscle strains, you should rest your muscles for about 2 days  However, if you have a severe muscle strain, you should rest for 10 to 14 days  You may need to use crutches to walk if your muscle strain is in your legs or lower body  ¨ Ice:  Put an ice pack on the injured area  Put a towel between the ice pack and your skin  Do not put the ice pack directly on your skin  You can use a package of frozen peas instead of an ice pack  ¨ Compression:  You may need to wrap an elastic bandage around the area to decrease swelling  It should be tight enough for you to feel support  Do not wrap it too tightly  ¨ Elevation:  Keep the injured muscle raised above your heart if possible  For example if you have a strain of your lower leg muscle, lie down and prop your leg up on pillows  This helps decrease pain and swelling  · 3 to 21 days after the injury:  Start to slowly and regularly exercise your muscle  This will help it heal  If you feel pain, decrease how hard you are exercising  · 1 to 6 weeks after the injury:  Stretch the injured muscle  Hold the stretch for about 30 seconds  Do this 4 times a day  You may stretch the muscle until you feel a slight pull  Stop stretching if you feel pain  · 2 weeks to 6 months after the injury:  The goal of this phase is to return to the activity you were doing before the injury happened, without hurting the muscle again  · 3 weeks to 6 months after the injury:  Keep stretching and strengthening your muscles to avoid injury  Slowly increase the time and distance that you exercise  You may have signs and symptoms of muscle strain 6 months after the injury, even if you do things to help it heal  In this case, you may need surgery on the muscle  © 2017 [employer] Information is for End User's use only and may not be sold, redistributed or otherwise used for commercial purposes   All illustrations and images included in CareNotes® are the copyrighted property of [employer]  or [employer]  The above information is an  only  It is not intended as medical advice for individual conditions or treatments  Talk to your doctor, nurse or pharmacist before following any medical regimen to see if it is safe and effective for you

## 2018-07-10 ENCOUNTER — TELEPHONE (OUTPATIENT)
Dept: FAMILY MEDICINE CLINIC | Facility: MEDICAL CENTER | Age: 63
End: 2018-07-10

## 2018-07-10 NOTE — TELEPHONE ENCOUNTER
Pt left a message on voicemail  She wants to discontinue her Venlafaxine 75mg she takes 1 capsule daily  She is asking how to wean off of this medication  Please advise
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
## 2018-08-20 RX ORDER — CYCLOBENZAPRINE HCL 5 MG
TABLET ORAL
Qty: 90 TABLET | Refills: 0 | Status: SHIPPED | OUTPATIENT
Start: 2018-08-20 | End: 2018-09-16 | Stop reason: SDUPTHER

## 2018-09-13 ENCOUNTER — ANNUAL EXAM (OUTPATIENT)
Dept: FAMILY MEDICINE CLINIC | Facility: MEDICAL CENTER | Age: 63
End: 2018-09-13
Payer: COMMERCIAL

## 2018-09-13 ENCOUNTER — TELEPHONE (OUTPATIENT)
Dept: FAMILY MEDICINE CLINIC | Facility: MEDICAL CENTER | Age: 63
End: 2018-09-13

## 2018-09-13 VITALS
HEIGHT: 63 IN | RESPIRATION RATE: 16 BRPM | SYSTOLIC BLOOD PRESSURE: 124 MMHG | HEART RATE: 88 BPM | DIASTOLIC BLOOD PRESSURE: 88 MMHG | WEIGHT: 143.2 LBS | BODY MASS INDEX: 25.37 KG/M2

## 2018-09-13 DIAGNOSIS — E78.01 FAMILIAL HYPERCHOLESTEROLEMIA: ICD-10-CM

## 2018-09-13 DIAGNOSIS — Z12.39 SCREENING FOR BREAST CANCER: Primary | ICD-10-CM

## 2018-09-13 DIAGNOSIS — M81.8 OTHER OSTEOPOROSIS WITHOUT CURRENT PATHOLOGICAL FRACTURE: ICD-10-CM

## 2018-09-13 DIAGNOSIS — K21.9 GASTROESOPHAGEAL REFLUX DISEASE, ESOPHAGITIS PRESENCE NOT SPECIFIED: ICD-10-CM

## 2018-09-13 DIAGNOSIS — E55.9 VITAMIN D DEFICIENCY: ICD-10-CM

## 2018-09-13 DIAGNOSIS — M21.611 BUNION OF RIGHT FOOT: ICD-10-CM

## 2018-09-13 PROCEDURE — 99396 PREV VISIT EST AGE 40-64: CPT | Performed by: FAMILY MEDICINE

## 2018-09-13 RX ORDER — CLINDAMYCIN HYDROCHLORIDE 300 MG/1
CAPSULE ORAL
COMMUNITY
Start: 2018-09-12 | End: 2018-12-09

## 2018-09-13 RX ORDER — OMEPRAZOLE 20 MG/1
20 CAPSULE, DELAYED RELEASE ORAL DAILY
Qty: 90 CAPSULE | Refills: 1 | Status: SHIPPED | OUTPATIENT
Start: 2018-09-13 | End: 2019-03-05 | Stop reason: SDUPTHER

## 2018-09-13 NOTE — PROGRESS NOTES
Josh Treadwell is here for [facility] checkup  See notes last Julyl  Seen in ER  Had right hip flexor strain  Gettiing better  She says she is getting  more reflux with Protonix  Did well with Rx omeprazole   Zantac, OTC Prilosec did not work either  Gets severe burning in her chest     She complains of a painful bunion on her right foot  Difficulty wearing shoes  HH: Eatiing fruits and vegetables  Exercises with walking at lunch and stationery bike   Caffeiine 2 coffee   No soda  Alcohol occ  Dietary calcium several daily  FH: unchanged  SH: [facility] in [facility]    Review of Systems   Genitourinary: Negative for difficulty urinating, dyspareunia, dysuria, urgency, vaginal bleeding, vaginal discharge and vaginal pain  No breast complaints  No incontinence    Pelvic US 5/18  Left ovarian cyst resolved  Right ovary not visualized  O: /88 (Cuff Size: Standard)   Pulse 88   Resp 16   Ht 5' 2 75" (1 594 m)   Wt 65 kg (143 lb 3 2 oz)   BMI 25 57 kg/m²   BP by me 120/78  Physical Exam   Constitutional: She appears well-developed and well-nourished  No distress  Neck: Carotid bruit is not present  No thyromegaly present  Cardiovascular: Normal rate, regular rhythm, normal heart sounds and intact distal pulses  Pulmonary/Chest: Effort normal and breath sounds normal  Respiratory distress: the    Abdominal: Soft  Bowel sounds are normal  She exhibits no mass  There is no hepatomegaly  There is no tenderness  Musculoskeletal: She exhibits no edema  Lymphadenopathy:     She has no cervical adenopathy  Breasts no masses or discharge  External genitalia normal  Vagina normal  Cervix normal no lesions  Uterus normal size shape and consistency  Adnexae non palpable  Rectal exam no masses stool  heme negative    Assessment  1  Health maintenance-due for mammogram, blood work  Immunizations updated  Colonoscopy up-to-date  Declines new pneumonia shots until next year  Discussed Shingrix     2  Postmenopausal female normal exam  3  Left ovarian cyst-resolved  4  Bunion right foot-will give Podiatry referral  5  Esophageal reflux-has done well with omeprazole in the past   Will renew  All; may need prior auth  6  Right flexor strain of the hip-resolving  7  Osteoporosis-on Prolia       Plan  Check BW, mammogram    Podiatry referral
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
## 2018-09-13 NOTE — TELEPHONE ENCOUNTER
Pt was prescribed omeprazole by Dr Becky Mcleod at her visit due to [facility] not being effective  It may need prior auth  We are to try to get it approved since we have documentation the Protonix was tried and failed   Watch for form from the pharmacy ,at least by Friday   I can call the pharmacy to check also if went through
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
## 2018-09-16 RX ORDER — CYCLOBENZAPRINE HCL 5 MG
TABLET ORAL
Qty: 90 TABLET | Refills: 0 | Status: SHIPPED | OUTPATIENT
Start: 2018-09-16 | End: 2018-10-13 | Stop reason: SDUPTHER

## 2018-09-17 ENCOUNTER — OFFICE VISIT (OUTPATIENT)
Dept: RHEUMATOLOGY | Facility: CLINIC | Age: 63
End: 2018-09-17
Payer: COMMERCIAL

## 2018-09-17 VITALS
HEIGHT: 64 IN | WEIGHT: 140 LBS | HEART RATE: 80 BPM | SYSTOLIC BLOOD PRESSURE: 110 MMHG | DIASTOLIC BLOOD PRESSURE: 64 MMHG | BODY MASS INDEX: 23.9 KG/M2

## 2018-09-17 DIAGNOSIS — IMO0001 COMPRESSION FRACTURE OF VERTEBRA, SEQUELA: ICD-10-CM

## 2018-09-17 DIAGNOSIS — M81.8 OTHER OSTEOPOROSIS WITHOUT CURRENT PATHOLOGICAL FRACTURE: Primary | ICD-10-CM

## 2018-09-17 PROCEDURE — 99213 OFFICE O/P EST LOW 20 MIN: CPT | Performed by: PHYSICIAN ASSISTANT

## 2018-10-05 DIAGNOSIS — K21.9 GASTROESOPHAGEAL REFLUX DISEASE WITHOUT ESOPHAGITIS: ICD-10-CM

## 2018-10-07 RX ORDER — PANTOPRAZOLE SODIUM 40 MG/1
40 TABLET, DELAYED RELEASE ORAL DAILY
Qty: 90 TABLET | Refills: 1 | Status: SHIPPED | OUTPATIENT
Start: 2018-10-07 | End: 2019-02-18 | Stop reason: CLARIF
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
## 2018-10-14 RX ORDER — CYCLOBENZAPRINE HCL 5 MG
TABLET ORAL
Qty: 90 TABLET | Refills: 0 | Status: SHIPPED | OUTPATIENT
Start: 2018-10-14 | End: 2018-11-14 | Stop reason: SDUPTHER
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
## 2018-11-15 RX ORDER — CYCLOBENZAPRINE HCL 5 MG
TABLET ORAL
Qty: 90 TABLET | Refills: 0 | Status: SHIPPED | OUTPATIENT
Start: 2018-11-15 | End: 2018-12-14 | Stop reason: SDUPTHER

## 2018-11-27 LAB
25(OH)D3+25(OH)D2 SERPL-MCNC: 53.1 NG/ML (ref 30–100)
ALBUMIN SERPL-MCNC: 4.5 G/DL (ref 3.6–4.8)
ALBUMIN/GLOB SERPL: 1.9 {RATIO} (ref 1.2–2.2)
ALP SERPL-CCNC: 56 IU/L (ref 39–117)
ALT SERPL-CCNC: 13 IU/L (ref 0–32)
AST SERPL-CCNC: 18 IU/L (ref 0–40)
BASOPHILS # BLD AUTO: 0.1 X10E3/UL (ref 0–0.2)
BASOPHILS NFR BLD AUTO: 1 %
BILIRUB SERPL-MCNC: 0.5 MG/DL (ref 0–1.2)
BUN SERPL-MCNC: 14 MG/DL (ref 8–27)
BUN/CREAT SERPL: 14 (ref 12–28)
CALCIUM SERPL-MCNC: 10.8 MG/DL (ref 8.7–10.3)
CHLORIDE SERPL-SCNC: 101 MMOL/L (ref 96–106)
CHOLEST SERPL-MCNC: 217 MG/DL (ref 100–199)
CO2 SERPL-SCNC: 25 MMOL/L (ref 20–29)
CREAT SERPL-MCNC: 1 MG/DL (ref 0.57–1)
EOSINOPHIL # BLD AUTO: 0.6 X10E3/UL (ref 0–0.4)
EOSINOPHIL NFR BLD AUTO: 7 %
ERYTHROCYTE [DISTWIDTH] IN BLOOD BY AUTOMATED COUNT: 12.8 % (ref 12.3–15.4)
GLOBULIN SER-MCNC: 2.4 G/DL (ref 1.5–4.5)
GLUCOSE SERPL-MCNC: 80 MG/DL (ref 65–99)
HCT VFR BLD AUTO: 40.6 % (ref 34–46.6)
HDLC SERPL-MCNC: 48 MG/DL
HGB BLD-MCNC: 13.3 G/DL (ref 11.1–15.9)
IMM GRANULOCYTES # BLD: 0 X10E3/UL (ref 0–0.1)
IMM GRANULOCYTES NFR BLD: 0 %
LDLC SERPL CALC-MCNC: 123 MG/DL (ref 0–99)
LYMPHOCYTES # BLD AUTO: 3.5 X10E3/UL (ref 0.7–3.1)
LYMPHOCYTES NFR BLD AUTO: 39 %
MCH RBC QN AUTO: 30 PG (ref 26.6–33)
MCHC RBC AUTO-ENTMCNC: 32.8 G/DL (ref 31.5–35.7)
MCV RBC AUTO: 91 FL (ref 79–97)
MONOCYTES # BLD AUTO: 0.9 X10E3/UL (ref 0.1–0.9)
MONOCYTES NFR BLD AUTO: 10 %
NEUTROPHILS # BLD AUTO: 3.9 X10E3/UL (ref 1.4–7)
NEUTROPHILS NFR BLD AUTO: 43 %
PLATELET # BLD AUTO: 423 X10E3/UL (ref 150–379)
POTASSIUM SERPL-SCNC: 4.6 MMOL/L (ref 3.5–5.2)
PROT SERPL-MCNC: 6.9 G/DL (ref 6–8.5)
RBC # BLD AUTO: 4.44 X10E6/UL (ref 3.77–5.28)
SL AMB EGFR AFRICAN AMERICAN: 69 ML/MIN/1.73
SL AMB EGFR NON AFRICAN AMERICAN: 60 ML/MIN/1.73
SL AMB VLDL CHOLESTEROL CALC: 46 MG/DL (ref 5–40)
SODIUM SERPL-SCNC: 139 MMOL/L (ref 134–144)
TRIGL SERPL-MCNC: 232 MG/DL (ref 0–149)
TSH SERPL DL<=0.005 MIU/L-ACNC: 3.19 UIU/ML (ref 0.45–4.5)
WBC # BLD AUTO: 9 X10E3/UL (ref 3.4–10.8)

## 2018-11-29 ENCOUNTER — TELEPHONE (OUTPATIENT)
Dept: FAMILY MEDICINE CLINIC | Facility: MEDICAL CENTER | Age: 63
End: 2018-11-29

## 2018-11-29 NOTE — TELEPHONE ENCOUNTER
----- Message from Sara Toledo MD sent at 11/29/2018  9:59 AM EST -----  Notify blood work results  All good except for mildly elevated cholesterol and triglycerides  Will review at her appointment  Chemistries are good except that her calcium is elevated  How much supplement is she taking?

## 2018-11-30 DIAGNOSIS — E83.52 HYPERCALCEMIA: Primary | ICD-10-CM

## 2018-12-09 ENCOUNTER — OFFICE VISIT (OUTPATIENT)
Dept: URGENT CARE | Facility: MEDICAL CENTER | Age: 63
End: 2018-12-09
Payer: COMMERCIAL

## 2018-12-09 VITALS
OXYGEN SATURATION: 100 % | DIASTOLIC BLOOD PRESSURE: 78 MMHG | SYSTOLIC BLOOD PRESSURE: 136 MMHG | WEIGHT: 137.8 LBS | HEIGHT: 64 IN | HEART RATE: 98 BPM | BODY MASS INDEX: 23.52 KG/M2 | TEMPERATURE: 99.4 F | RESPIRATION RATE: 18 BRPM

## 2018-12-09 DIAGNOSIS — L03.011 CELLULITIS OF RIGHT THUMB: Primary | ICD-10-CM

## 2018-12-09 PROCEDURE — 99213 OFFICE O/P EST LOW 20 MIN: CPT | Performed by: PHYSICIAN ASSISTANT

## 2018-12-09 RX ORDER — CLINDAMYCIN HYDROCHLORIDE 300 MG/1
300 CAPSULE ORAL 3 TIMES DAILY
Qty: 21 CAPSULE | Refills: 0 | Status: SHIPPED | OUTPATIENT
Start: 2018-12-09 | End: 2018-12-16

## 2018-12-09 RX ORDER — SULFAMETHOXAZOLE AND TRIMETHOPRIM 800; 160 MG/1; MG/1
1 TABLET ORAL EVERY 12 HOURS SCHEDULED
Qty: 14 TABLET | Refills: 0 | Status: SHIPPED | OUTPATIENT
Start: 2018-12-09 | End: 2018-12-16

## 2018-12-09 NOTE — PATIENT INSTRUCTIONS
Cellulitis   AMBULATORY CARE:   Cellulitis  is a skin infection caused by bacteria  Cellulitis usually appears on the legs and feet, arms and hands, or face  Common signs and symptoms include the following:   · A red, warm, swollen area on your skin    · Pain when the area is touched    · Bumps or blisters (abscess) that may drain pus    · Bumpy, raised skin that feels like an orange peel  Call 911 if:   · You have sudden trouble breathing or chest pain  Seek care immediately if:   · Your wound gets larger and more painful  · You feel a crackling under your skin when you touch it  · You have purple dots or bumps on your skin, or you see bleeding under your skin  · You have new swelling and pain in your legs  · The red, warm, swollen area gets larger  · You see red streaks coming from the infected area  Contact your healthcare provider if:   · You have a fever  · Your fever or pain does not go away or gets worse  · The area does not get smaller after 2 days of antibiotics  · Your skin is flaking or peeling off  · You have questions or concerns about your condition or care  Treatment for cellulitis  may decrease symptoms, stop the infection from spreading, and cure the infection  Treatment depends on how severe your cellulitis is  Cellulitis may go away on its own  You may  instead need antibiotics to help treat the bacterial infection and medicines for pain  Your healthcare provider may draw a [employer] around the edges of your cellulitis  If your cellulitis spreads, your healthcare provider will see it outside of the [employer]  Manage your symptoms:   · Elevate the area above the level of your heart  as often as you can  This will help decrease swelling and pain  Prop the area on pillows or blankets to keep it elevated comfortably  · Clean the area daily until the wound scabs over  Gently wash the area with soap and water  Pat dry  Use dressings as directed       · Place cool or warm, wet cloths on the area as directed  Use clean cloths and clean water  Leave it on the area until the cloth is room temperature  Pat the area dry with a clean, dry cloth  The cloths may help decrease pain  Prevent cellulitis:   · Do not scratch bug bites or areas of injury  You increase your risk for cellulitis by scratching these areas  · Do not share personal items, such as towels, clothing, and razors  · Clean exercise equipment  with germ-killing  before and after you use it  · Wash your hands often  Use soap and water  Wash your hands after you use the bathroom, change a child's diapers, or sneeze  Wash your hands before you prepare or eat food  Use lotion to prevent dry, cracked skin  · Wear pressure stockings as directed  You may be told to wear the stockings if you have peripheral edema  The stockings improve blood flow and decrease swelling  · Treat athlete's foot  This can help prevent the spread of a bacterial skin infection  Follow up with your healthcare provider within 3 days, or as directed: Your healthcare provider will check if your cellulitis is getting better  You may need different medicine  Write down your questions so you remember to ask them during your visits  © 2017 [employer]  Information is for End User's use only and may not be sold, redistributed or otherwise used for commercial purposes  All illustrations and images included in CareNotes® are the copyrighted property of [employer]  or [employer]  The above information is an  only  It is not intended as medical advice for individual conditions or treatments  Talk to your doctor, nurse or pharmacist before following any medical regimen to see if it is safe and effective for you

## 2018-12-12 ENCOUNTER — APPOINTMENT (EMERGENCY)
Dept: RADIOLOGY | Facility: HOSPITAL | Age: 63
End: 2018-12-12
Payer: COMMERCIAL

## 2018-12-12 ENCOUNTER — TELEPHONE (OUTPATIENT)
Dept: FAMILY MEDICINE CLINIC | Facility: MEDICAL CENTER | Age: 63
End: 2018-12-12

## 2018-12-12 ENCOUNTER — HOSPITAL ENCOUNTER (EMERGENCY)
Facility: HOSPITAL | Age: 63
Discharge: HOME | End: 2018-12-12
Attending: EMERGENCY MEDICINE | Admitting: EMERGENCY MEDICINE
Payer: COMMERCIAL

## 2018-12-12 VITALS
SYSTOLIC BLOOD PRESSURE: 138 MMHG | RESPIRATION RATE: 16 BRPM | BODY MASS INDEX: 24.37 KG/M2 | TEMPERATURE: 98.2 F | DIASTOLIC BLOOD PRESSURE: 89 MMHG | WEIGHT: 141.98 LBS | HEART RATE: 90 BPM | OXYGEN SATURATION: 96 %

## 2018-12-12 DIAGNOSIS — M17.11 OSTEOARTHRITIS OF RIGHT KNEE, UNSPECIFIED OSTEOARTHRITIS TYPE: ICD-10-CM

## 2018-12-12 DIAGNOSIS — M77.9 BONE SPUR: Primary | ICD-10-CM

## 2018-12-12 PROCEDURE — 99283 EMERGENCY DEPT VISIT LOW MDM: CPT

## 2018-12-12 PROCEDURE — 73564 X-RAY EXAM KNEE 4 OR MORE: CPT

## 2018-12-12 RX ORDER — LIDOCAINE 50 MG/G
1 PATCH TOPICAL ONCE
Status: DISCONTINUED | OUTPATIENT
Start: 2018-12-12 | End: 2018-12-12 | Stop reason: HOSPADM

## 2018-12-12 RX ADMIN — LIDOCAINE 1 PATCH: 50 PATCH CUTANEOUS at 18:13

## 2018-12-12 NOTE — TELEPHONE ENCOUNTER
Spoke with Rhetta Brittle, she has pain below just below her knee  On the "inside" per the patient  No redness but there is swelling  She said she cannot put weight on that leg  I advised that we would need to have her go to the ER for imaging to rule out a clot  She said she did not think it was a clot but agreed to go have it evaluated at the ER   AMISHA

## 2018-12-12 NOTE — DISCHARGE INSTRUCTIONS
Osteoarthritis   WHAT YOU NEED TO KNOW:   Osteoarthritis (OA) occurs when cartilage (tissue that cushions a joint) wears away slowly and causes the bones to rub together  OA is a long-term condition that often affects the hands, neck, lower back, knees, and hips  OA is also called arthrosis or degenerative joint disease  DISCHARGE INSTRUCTIONS:   Return to the emergency department if:   · You have severe pain  · You cannot move your joint  Contact your healthcare provider if:   · You have a fever  · Your joint is red and tender  · You have questions or concerns about your condition or care  Medicines:   · Acetaminophen  is used to decrease pain  It is available without a doctor's order  Ask how much to take and how often to take it  Follow directions  Acetaminophen can cause liver damage if not taken correctly  · NSAIDs , such as ibuprofen, help decrease swelling, pain, and fever  This medicine is available with or without a doctor's order  NSAIDs can cause stomach bleeding or kidney problems in certain people  If you take blood thinner medicine, always ask your healthcare provider if NSAIDs are safe for you  Always read the medicine label and follow directions  · Prescription pain medicine  may be given to decrease severe pain if other medicines do not work  Take the medicine as directed  Do not wait until the pain is severe before you take your medicine  · Take your medicine as directed  Contact your healthcare provider if you think your medicine is not helping or if you have side effects  Tell him or her if you are allergic to any medicine  Keep a list of the medicines, vitamins, and herbs you take  Include the amounts, and when and why you take them  Bring the list or the pill bottles to follow-up visits  Carry your medicine list with you in case of an emergency  Follow up with your healthcare provider as directed:  Write down your questions so you remember to ask them during your visits  Go to physical therapy as directed:  A physical therapist teaches you exercises to help improve movement and strength, and to decrease pain in your joints  The exercises also help lower your risk for loss of function  Manage your OA:   · Stay active  Physical activity may reduce your pain and improve your ability to do daily activities  Avoid activities that cause pain  Ask your healthcare provider what type of exercise would be best for you  · Maintain a healthy weight  This helps decrease the strain on the joints in your back, hips, knees, ankles, and feet  Ask your healthcare provider how much you should weigh  Ask him to help you create a weight loss plan if you are overweight  · Use heat or ice  on your joints as directed  Heat and ice help decrease pain, swelling, and muscle spasms  Use a heating pad on a low setting or take a warm bath  Use an ice pack, or put crushed ice in a plastic bag  Cover it with a towel  · Massage  the muscles around the joint to relieve pain and stiffness  · Use a cane, crutches, or a walker  to protect and relieve pressure on your ankle, knee, and hip joints  You may also be prescribed shoe inserts to decrease pressure in your joints  · Wear flat or low-heeled shoes  This will help decrease pain and reduce pressure on your ankle, knee, and hip joints  © 2017 [employer]  Information is for End User's use only and may not be sold, redistributed or otherwise used for commercial purposes  All illustrations and images included in CareNotes® are the copyrighted property of [employer]  or [employer]  The above information is an  only  It is not intended as medical advice for individual conditions or treatments  Talk to your doctor, nurse or pharmacist before following any medical regimen to see if it is safe and effective for you                Muscle Strain   WHAT YOU NEED TO KNOW:   A muscle strain is a twist, pull, or tear of a muscle or tendon  A tendon is a strong elastic tissue that connects a muscle to a bone  Signs of a strained muscle include bruising and swelling over the area, pain with movement, and loss of strength  DISCHARGE INSTRUCTIONS:   Return to the emergency department if:   · You suddenly cannot feel or move your injured muscle  Contact your healthcare provider if:   · Your pain and swelling worsen or do not go away  · You have questions or concerns about your condition or care  Medicines:   · NSAIDs  help decrease swelling and pain or fever  This medicine is available with or without a doctor's order  NSAIDs can cause stomach bleeding or kidney problems in certain people  If you take blood thinner medicine, always ask your healthcare provider if NSAIDs are safe for you  Always read the medicine label and follow directions  · Muscle relaxers  help decrease pain and muscle spasms  · Take your medicine as directed  Contact your healthcare provider if you think your medicine is not helping or if you have side effects  Tell him of her if you are allergic to any medicine  Keep a list of the medicines, vitamins, and herbs you take  Include the amounts, and when and why you take them  Bring the list or the pill bottles to follow-up visits  Carry your medicine list with you in case of an emergency  Follow up with your healthcare provider as directed: Your healthcare provider may suggest that you have a follow-up visit before you go back to your usual activity  Write down your questions so you remember to ask them during your visits  Self-care:   · 3 to 7 days after the injury:  Use Rest, Ice, Compression, and Elevation (RICE) to help stop bruising and decrease pain and swelling  ¨ Rest:  Rest your muscle to allow your injury to heal  When the pain decreases, begin normal, slow movements  For mild and moderate muscle strains, you should rest your muscles for about 2 days   However, if you have a severe muscle strain, you should rest for 10 to 14 days  You may need to use crutches to walk if your muscle strain is in your legs or lower body  ¨ Ice:  Put an ice pack on the injured area  Put a towel between the ice pack and your skin  Do not put the ice pack directly on your skin  You can use a package of frozen peas instead of an ice pack  ¨ Compression:  You may need to wrap an elastic bandage around the area to decrease swelling  It should be tight enough for you to feel support  Do not wrap it too tightly  ¨ Elevation:  Keep the injured muscle raised above your heart if possible  For example if you have a strain of your lower leg muscle, lie down and prop your leg up on pillows  This helps decrease pain and swelling  · 3 to 21 days after the injury:  Start to slowly and regularly exercise your muscle  This will help it heal  If you feel pain, decrease how hard you are exercising  · 1 to 6 weeks after the injury:  Stretch the injured muscle  Hold the stretch for about 30 seconds  Do this 4 times a day  You may stretch the muscle until you feel a slight pull  Stop stretching if you feel pain  · 2 weeks to 6 months after the injury:  The goal of this phase is to return to the activity you were doing before the injury happened, without hurting the muscle again  · 3 weeks to 6 months after the injury:  Keep stretching and strengthening your muscles to avoid injury  Slowly increase the time and distance that you exercise  You may have signs and symptoms of muscle strain 6 months after the injury, even if you do things to help it heal  In this case, you may need surgery on the muscle  © 2017 [facility] Information is for End User's use only and may not be sold, redistributed or otherwise used for commercial purposes  All illustrations and images included in CareNotes® are the copyrighted property of [employer]  or Jason Davis    The above information is an  only  It is not intended as medical advice for individual conditions or treatments  Talk to your doctor, nurse or pharmacist before following any medical regimen to see if it is safe and effective for you

## 2018-12-12 NOTE — TELEPHONE ENCOUNTER
Per Dr Lisa Rios offered to see her this am, but advised we may still have to send for additional testing  Patient refused appt    She could not come in this am
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
## 2018-12-18 RX ORDER — CYCLOBENZAPRINE HCL 5 MG
TABLET ORAL
Qty: 90 TABLET | Refills: 0 | Status: SHIPPED | OUTPATIENT
Start: 2018-12-18 | End: 2019-01-14 | Stop reason: SDUPTHER

## 2018-12-27 ENCOUNTER — TELEPHONE (OUTPATIENT)
Dept: FAMILY MEDICINE CLINIC | Facility: MEDICAL CENTER | Age: 63
End: 2018-12-27

## 2018-12-27 NOTE — TELEPHONE ENCOUNTER
Patient called the office stating FMLA papers will be faxed to our office tomorrow for the patient to take care of her mother in Hospice  Routed to Clinical to advise
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
## 2019-01-16 RX ORDER — CYCLOBENZAPRINE HCL 5 MG
TABLET ORAL
Qty: 90 TABLET | Refills: 0 | Status: SHIPPED | OUTPATIENT
Start: 2019-01-16 | End: 2019-02-12 | Stop reason: SDUPTHER

## 2019-02-05 ENCOUNTER — HOSPITAL ENCOUNTER (OUTPATIENT)
Dept: RADIOLOGY | Facility: MEDICAL CENTER | Age: 64
Discharge: HOME | End: 2019-02-05
Payer: COMMERCIAL

## 2019-02-05 VITALS — HEIGHT: 64 IN | WEIGHT: 141 LBS | BODY MASS INDEX: 24.07 KG/M2

## 2019-02-05 DIAGNOSIS — Z12.31 ENCOUNTER FOR SCREENING MAMMOGRAM FOR MALIGNANT NEOPLASM OF BREAST: ICD-10-CM

## 2019-02-05 PROCEDURE — 77063 BREAST TOMOSYNTHESIS BI: CPT

## 2019-02-05 PROCEDURE — 77067 SCR MAMMO BI INCL CAD: CPT
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
## 2019-02-13 ENCOUNTER — OFFICE VISIT (OUTPATIENT)
Dept: URGENT CARE | Facility: MEDICAL CENTER | Age: 64
End: 2019-02-13
Payer: COMMERCIAL

## 2019-02-13 VITALS
TEMPERATURE: 98.6 F | DIASTOLIC BLOOD PRESSURE: 80 MMHG | HEART RATE: 90 BPM | WEIGHT: 136.6 LBS | HEIGHT: 64 IN | BODY MASS INDEX: 23.32 KG/M2 | SYSTOLIC BLOOD PRESSURE: 118 MMHG | RESPIRATION RATE: 20 BRPM | OXYGEN SATURATION: 99 %

## 2019-02-13 DIAGNOSIS — J06.9 VIRAL URI: ICD-10-CM

## 2019-02-13 DIAGNOSIS — R05.9 COUGH: Primary | ICD-10-CM

## 2019-02-13 PROCEDURE — 99213 OFFICE O/P EST LOW 20 MIN: CPT | Performed by: PHYSICIAN ASSISTANT

## 2019-02-13 RX ORDER — PROMETHAZINE HYDROCHLORIDE AND CODEINE PHOSPHATE 6.25; 1 MG/5ML; MG/5ML
5 SYRUP ORAL EVERY 4 HOURS PRN
Qty: 120 ML | Refills: 0 | Status: SHIPPED | OUTPATIENT
Start: 2019-02-13 | End: 2019-02-19 | Stop reason: SDUPTHER

## 2019-02-13 NOTE — PATIENT INSTRUCTIONS
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
## 2019-02-14 RX ORDER — CYCLOBENZAPRINE HCL 5 MG
TABLET ORAL
Qty: 90 TABLET | Refills: 0 | Status: SHIPPED | OUTPATIENT
Start: 2019-02-14 | End: 2019-03-20 | Stop reason: SDUPTHER

## 2019-02-18 ENCOUNTER — TELEPHONE (OUTPATIENT)
Dept: FAMILY MEDICINE CLINIC | Facility: MEDICAL CENTER | Age: 64
End: 2019-02-18

## 2019-02-18 ENCOUNTER — OFFICE VISIT (OUTPATIENT)
Dept: FAMILY MEDICINE CLINIC | Facility: MEDICAL CENTER | Age: 64
End: 2019-02-18
Payer: COMMERCIAL

## 2019-02-18 VITALS
WEIGHT: 136 LBS | TEMPERATURE: 99 F | SYSTOLIC BLOOD PRESSURE: 138 MMHG | HEART RATE: 100 BPM | RESPIRATION RATE: 16 BRPM | BODY MASS INDEX: 23.34 KG/M2 | DIASTOLIC BLOOD PRESSURE: 80 MMHG

## 2019-02-18 DIAGNOSIS — J32.9 RHINOSINUSITIS: Primary | ICD-10-CM

## 2019-02-18 DIAGNOSIS — J31.0 RHINOSINUSITIS: Primary | ICD-10-CM

## 2019-02-18 PROCEDURE — 99213 OFFICE O/P EST LOW 20 MIN: CPT | Performed by: FAMILY MEDICINE

## 2019-02-18 PROCEDURE — 1036F TOBACCO NON-USER: CPT | Performed by: FAMILY MEDICINE

## 2019-02-18 RX ORDER — CEFPROZIL 500 MG/1
500 TABLET, FILM COATED ORAL 2 TIMES DAILY
Qty: 20 TABLET | Refills: 0 | Status: SHIPPED | OUTPATIENT
Start: 2019-02-18 | End: 2019-02-28

## 2019-02-18 NOTE — TELEPHONE ENCOUNTER
Please triage -----Called late Wed for appt for chest cold  We were already finishing up and she was told to go to the   Calling now that she feels worse

## 2019-02-19 DIAGNOSIS — R05.9 COUGH: ICD-10-CM

## 2019-02-19 RX ORDER — PROMETHAZINE HYDROCHLORIDE AND CODEINE PHOSPHATE 6.25; 1 MG/5ML; MG/5ML
5 SYRUP ORAL EVERY 4 HOURS PRN
Qty: 120 ML | Refills: 0 | Status: SHIPPED | OUTPATIENT
Start: 2019-02-19 | End: 2019-03-21 | Stop reason: SDUPTHER

## 2019-02-19 NOTE — PROGRESS NOTES
Has had cold symptoms and cough runny nose for a little bit over a week  She was seen in urgent care  She is no better  She has had some fevers  Up to about 102  They do respond to ibuprofen  When she has these fevers she has a bit of a headache as well  /80   Pulse 100   Temp 99 °F (37 2 °C)   Resp 16   Wt 61 7 kg (136 lb)   BMI 23 34 kg/m²     ENT was normal except for red nasal turbinates and postnasal drip  Some lymphoid hyperplasia in the pharynx  Mild cervical lymphadenopathy chest was completely clear to percussion auscultation cardiac exam was normal    Rhinosinusitis  Continue Phenergan with codeine    Plenty of fluids, rest

## 2019-02-21 ENCOUNTER — TELEPHONE (OUTPATIENT)
Dept: FAMILY MEDICINE CLINIC | Facility: MEDICAL CENTER | Age: 64
End: 2019-02-21

## 2019-02-21 NOTE — TELEPHONE ENCOUNTER
Aviva Pope not feeling well enough to return to work tomorrow and would like a new note to extend her absence one more day  See previous note written on 2/18/19  She can return on Monday 2/25/2019  Please write her a new note and call her to  or where we can fax

## 2019-03-05 DIAGNOSIS — K21.9 GASTROESOPHAGEAL REFLUX DISEASE, ESOPHAGITIS PRESENCE NOT SPECIFIED: ICD-10-CM

## 2019-03-07 RX ORDER — OMEPRAZOLE 20 MG/1
CAPSULE, DELAYED RELEASE ORAL
Qty: 90 CAPSULE | Refills: 1 | Status: SHIPPED | OUTPATIENT
Start: 2019-03-07 | End: 2019-08-22 | Stop reason: SDUPTHER

## 2019-03-14 ENCOUNTER — TELEPHONE (OUTPATIENT)
Dept: RHEUMATOLOGY | Facility: CLINIC | Age: 64
End: 2019-03-14

## 2019-03-15 ENCOUNTER — TELEPHONE (OUTPATIENT)
Dept: FAMILY MEDICINE CLINIC | Facility: MEDICAL CENTER | Age: 64
End: 2019-03-15

## 2019-03-15 NOTE — TELEPHONE ENCOUNTER
Seen recently for cough and cold  Given a codeine cough syrup  Cold symptoms have all returned and cough is miserable at  Night and she can't sleep   Can you refill the codeine cough med to [facility]

## 2019-03-18 ENCOUNTER — TELEPHONE (OUTPATIENT)
Dept: OBGYN CLINIC | Facility: HOSPITAL | Age: 64
End: 2019-03-18
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
## 2019-03-21 ENCOUNTER — OFFICE VISIT (OUTPATIENT)
Dept: FAMILY MEDICINE CLINIC | Facility: MEDICAL CENTER | Age: 64
End: 2019-03-21
Payer: COMMERCIAL

## 2019-03-21 VITALS
DIASTOLIC BLOOD PRESSURE: 76 MMHG | BODY MASS INDEX: 23.22 KG/M2 | RESPIRATION RATE: 16 BRPM | WEIGHT: 135.25 LBS | SYSTOLIC BLOOD PRESSURE: 122 MMHG | OXYGEN SATURATION: 98 % | HEART RATE: 90 BPM | TEMPERATURE: 98.5 F

## 2019-03-21 DIAGNOSIS — J02.9 PHARYNGITIS, UNSPECIFIED ETIOLOGY: ICD-10-CM

## 2019-03-21 DIAGNOSIS — R05.9 COUGH: ICD-10-CM

## 2019-03-21 DIAGNOSIS — J40 BRONCHITIS: Primary | ICD-10-CM

## 2019-03-21 PROCEDURE — 99213 OFFICE O/P EST LOW 20 MIN: CPT | Performed by: FAMILY MEDICINE

## 2019-03-21 RX ORDER — PREDNISONE 20 MG/1
20 TABLET ORAL DAILY
Qty: 5 TABLET | Refills: 0 | Status: SHIPPED | OUTPATIENT
Start: 2019-03-21 | End: 2019-04-25 | Stop reason: ALTCHOICE

## 2019-03-21 RX ORDER — AZITHROMYCIN 250 MG/1
500 TABLET, FILM COATED ORAL EVERY 24 HOURS
Qty: 10 TABLET | Refills: 0 | Status: SHIPPED | OUTPATIENT
Start: 2019-03-21 | End: 2019-03-26

## 2019-03-21 RX ORDER — AMOXICILLIN 500 MG/1
500 CAPSULE ORAL EVERY 8 HOURS SCHEDULED
Status: CANCELLED | OUTPATIENT
Start: 2019-03-21

## 2019-03-21 RX ORDER — PROMETHAZINE HYDROCHLORIDE AND CODEINE PHOSPHATE 6.25; 1 MG/5ML; MG/5ML
5 SYRUP ORAL EVERY 4 HOURS PRN
Qty: 120 ML | Refills: 0 | OUTPATIENT
Start: 2019-03-21 | End: 2019-04-25 | Stop reason: ALTCHOICE

## 2019-03-21 NOTE — PROGRESS NOTES
Seen here 2/18  Treated for an upper respiratory infection with Cefzil  Cough got better but has recurred  Occ productive which is green  Sore thraot a week ago biut resolved  No current fever or chills  Cough bad at night  She gets seasonal allergies  O: /76   Pulse 90   Temp 98 5 °F (36 9 °C) (Oral)   Resp 16   Wt 61 3 kg (135 lb 4 oz)   SpO2 98%   BMI 23 22 kg/m²   ENT-sinuses nontender  TMs normal   Pharynx road red posteriorly  Neck no adenopathy  Chest clear with good breath sounds  No forced expiratory wheezing    Assessment  Bronchitis    Plan  Rx for Zithromax  Prednisone  Refill the promethazine with codeine  Encouraged hydration and humidification    Call if no better will need chest x-ray

## 2019-03-22 RX ORDER — CYCLOBENZAPRINE HCL 5 MG
TABLET ORAL
Qty: 90 TABLET | Refills: 0 | Status: SHIPPED | OUTPATIENT
Start: 2019-03-22 | End: 2019-04-20 | Stop reason: SDUPTHER
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
## 2019-04-21 RX ORDER — CYCLOBENZAPRINE HCL 5 MG
TABLET ORAL
Qty: 90 TABLET | Refills: 0 | Status: SHIPPED | OUTPATIENT
Start: 2019-04-21 | End: 2019-05-18 | Stop reason: SDUPTHER

## 2019-04-25 ENCOUNTER — OFFICE VISIT (OUTPATIENT)
Dept: FAMILY MEDICINE CLINIC | Facility: MEDICAL CENTER | Age: 64
End: 2019-04-25
Payer: COMMERCIAL

## 2019-04-25 ENCOUNTER — APPOINTMENT (OUTPATIENT)
Dept: LAB | Facility: MEDICAL CENTER | Age: 64
End: 2019-04-25
Payer: COMMERCIAL

## 2019-04-25 VITALS
HEART RATE: 70 BPM | TEMPERATURE: 97.7 F | WEIGHT: 138.4 LBS | BODY MASS INDEX: 23.76 KG/M2 | OXYGEN SATURATION: 98 % | SYSTOLIC BLOOD PRESSURE: 120 MMHG | DIASTOLIC BLOOD PRESSURE: 76 MMHG

## 2019-04-25 DIAGNOSIS — E83.52 HYPERCALCEMIA: ICD-10-CM

## 2019-04-25 DIAGNOSIS — R05.9 COUGH: ICD-10-CM

## 2019-04-25 LAB
ALBUMIN SERPL BCP-MCNC: 3.9 G/DL (ref 3.5–5)
ALP SERPL-CCNC: 82 U/L (ref 46–116)
ALT SERPL W P-5'-P-CCNC: 29 U/L (ref 12–78)
ANION GAP SERPL CALCULATED.3IONS-SCNC: 6 MMOL/L (ref 4–13)
AST SERPL W P-5'-P-CCNC: 21 U/L (ref 5–45)
BILIRUB SERPL-MCNC: 0.22 MG/DL (ref 0.2–1)
BUN SERPL-MCNC: 18 MG/DL (ref 5–25)
CALCIUM SERPL-MCNC: 8.7 MG/DL (ref 8.3–10.1)
CHLORIDE SERPL-SCNC: 107 MMOL/L (ref 100–108)
CO2 SERPL-SCNC: 27 MMOL/L (ref 21–32)
CREAT SERPL-MCNC: 0.86 MG/DL (ref 0.6–1.3)
GFR SERPL CREATININE-BSD FRML MDRD: 72 ML/MIN/1.73SQ M
GLUCOSE SERPL-MCNC: 94 MG/DL (ref 65–140)
POTASSIUM SERPL-SCNC: 3.9 MMOL/L (ref 3.5–5.3)
PROT SERPL-MCNC: 7.3 G/DL (ref 6.4–8.2)
SODIUM SERPL-SCNC: 140 MMOL/L (ref 136–145)

## 2019-04-25 PROCEDURE — 36415 COLL VENOUS BLD VENIPUNCTURE: CPT

## 2019-04-25 PROCEDURE — 99213 OFFICE O/P EST LOW 20 MIN: CPT | Performed by: FAMILY MEDICINE

## 2019-04-25 PROCEDURE — 80053 COMPREHEN METABOLIC PANEL: CPT

## 2019-04-25 RX ORDER — PROMETHAZINE HYDROCHLORIDE AND CODEINE PHOSPHATE 6.25; 1 MG/5ML; MG/5ML
5 SYRUP ORAL EVERY 4 HOURS PRN
Qty: 120 ML | Refills: 0 | Status: SHIPPED | OUTPATIENT
Start: 2019-04-25 | End: 2019-07-29 | Stop reason: ALTCHOICE

## 2019-04-29 ENCOUNTER — TELEPHONE (OUTPATIENT)
Dept: FAMILY MEDICINE CLINIC | Facility: MEDICAL CENTER | Age: 64
End: 2019-04-29

## 2019-04-29 DIAGNOSIS — R05.9 COUGH: Primary | ICD-10-CM

## 2019-04-29 RX ORDER — AZITHROMYCIN 250 MG/1
500 TABLET, FILM COATED ORAL EVERY 24 HOURS
Qty: 10 TABLET | Refills: 0 | Status: SHIPPED | OUTPATIENT
Start: 2019-04-29 | End: 2019-05-04

## 2019-05-02 ENCOUNTER — TELEPHONE (OUTPATIENT)
Dept: FAMILY MEDICINE CLINIC | Facility: MEDICAL CENTER | Age: 64
End: 2019-05-02
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
## 2019-05-20 RX ORDER — CYCLOBENZAPRINE HCL 5 MG
TABLET ORAL
Qty: 90 TABLET | Refills: 0 | Status: SHIPPED | OUTPATIENT
Start: 2019-05-20 | End: 2019-06-21 | Stop reason: SDUPTHER
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
## 2019-06-21 RX ORDER — CYCLOBENZAPRINE HCL 5 MG
TABLET ORAL
Qty: 90 TABLET | Refills: 0 | OUTPATIENT
Start: 2019-06-21 | End: 2019-09-19 | Stop reason: ALTCHOICE

## 2019-07-01 ENCOUNTER — TELEPHONE (OUTPATIENT)
Dept: OBGYN CLINIC | Facility: HOSPITAL | Age: 64
End: 2019-07-01

## 2019-07-01 DIAGNOSIS — M81.0 OSTEOPOROSIS, UNSPECIFIED OSTEOPOROSIS TYPE, UNSPECIFIED PATHOLOGICAL FRACTURE PRESENCE: Primary | ICD-10-CM

## 2019-07-01 NOTE — TELEPHONE ENCOUNTER
Called and spoke with patient   She is going to have her dexa scan done and come in for prolia and review of that in august  Please place order so she can schedule scan

## 2019-07-01 NOTE — TELEPHONE ENCOUNTER
Pt contacted Call Center requested refill of their medication  Medication Name: denosumab (Prolia) inj      Dosage of Med: injection 60mg      Frequency of Med: once every 6 months      Remaining Medication:      Pharmacy and Location:        Pt  Preferred Callback Phone Number: [phone]      Thank you      *patient has new insurance effective 07/01/19  Medicare and [address], [address] [address] for prescriptions

## 2019-07-01 NOTE — TELEPHONE ENCOUNTER
Looks like we can buy and bill Prolia? It looks like she is due to a new DEXA scan in August   Its up to her if she wants to wait to have her updated scan and then come in, if so I can place order in the chart  Either way she is overdue for Prolia and needs to come in, thanks

## 2019-07-19 DIAGNOSIS — M62.838 MUSCLE SPASM: Primary | ICD-10-CM

## 2019-07-21 RX ORDER — CYCLOBENZAPRINE HCL 5 MG
TABLET ORAL
Qty: 90 TABLET | Refills: 0 | Status: SHIPPED | OUTPATIENT
Start: 2019-07-21 | End: 2019-07-29 | Stop reason: ALTCHOICE

## 2019-07-29 ENCOUNTER — OFFICE VISIT (OUTPATIENT)
Dept: FAMILY MEDICINE CLINIC | Facility: MEDICAL CENTER | Age: 64
End: 2019-07-29
Payer: MEDICARE

## 2019-07-29 ENCOUNTER — APPOINTMENT (OUTPATIENT)
Dept: RADIOLOGY | Facility: MEDICAL CENTER | Age: 64
End: 2019-07-29
Payer: MEDICARE

## 2019-07-29 DIAGNOSIS — D47.3 ESSENTIAL HEMORRHAGIC THROMBOCYTHEMIA (HCC): ICD-10-CM

## 2019-07-29 DIAGNOSIS — M46.1 SACROILIITIS (HCC): ICD-10-CM

## 2019-07-29 DIAGNOSIS — M25.552 HIP PAIN, ACUTE, LEFT: ICD-10-CM

## 2019-07-29 DIAGNOSIS — M54.42 ACUTE LEFT-SIDED LOW BACK PAIN WITH LEFT-SIDED SCIATICA: ICD-10-CM

## 2019-07-29 DIAGNOSIS — M54.42 ACUTE LEFT-SIDED LOW BACK PAIN WITH LEFT-SIDED SCIATICA: Primary | ICD-10-CM

## 2019-07-29 PROCEDURE — 72110 X-RAY EXAM L-2 SPINE 4/>VWS: CPT

## 2019-07-29 PROCEDURE — 73502 X-RAY EXAM HIP UNI 2-3 VIEWS: CPT

## 2019-07-29 PROCEDURE — 99213 OFFICE O/P EST LOW 20 MIN: CPT | Performed by: FAMILY MEDICINE

## 2019-07-29 RX ORDER — OXYCODONE HYDROCHLORIDE AND ACETAMINOPHEN 5; 325 MG/1; MG/1
1 TABLET ORAL EVERY 4 HOURS PRN
Qty: 30 TABLET | Refills: 0 | Status: SHIPPED | OUTPATIENT
Start: 2019-07-29 | End: 2019-08-08 | Stop reason: SDUPTHER

## 2019-07-29 RX ORDER — METHYLPREDNISOLONE 4 MG/1
TABLET ORAL
Qty: 21 EACH | Refills: 0 | Status: SHIPPED | OUTPATIENT
Start: 2019-07-29 | End: 2019-08-16 | Stop reason: ALTCHOICE

## 2019-07-29 RX ORDER — IBUPROFEN 600 MG/1
TABLET ORAL
Qty: 30 TABLET | Refills: 0 | Status: SHIPPED | OUTPATIENT
Start: 2019-07-29 | End: 2019-08-16 | Stop reason: ALTCHOICE

## 2019-07-29 NOTE — PROGRESS NOTES
Sherry Alavrez has severe pain in her left groin, thigh, buttock  Throbbing pain  Can't sleep  Aggravated by laying on her left side  Tried heat, ice, Mobic, ibuprofen  Flexeril helped a little  No numbness or tingling  She had some discomfort here several years ago but nothing like this  No history of trauma  O: There were no vitals taken for this visit  Patient is in obvious pain  She prefers to stand  Back no spinal tenderness  Reflexes 2+ in both knees  1+ right ankle  No ankle reflex on the left  She has a positive straight leg raising at 15° on the left  Unable to internally or externally rotate her lower left lower extremity at the hip pain  Assessment  Left buttock hip groin thigh pain-likely sciatica    Plan  Check x-rays LS spine and hip and pelvis  Rx for Medrol Dosepak, Percocet, and ibuprofen  Call with progress 2 days

## 2019-08-02 ENCOUNTER — HOSPITAL ENCOUNTER (OUTPATIENT)
Dept: RADIOLOGY | Facility: MEDICAL CENTER | Age: 64
Discharge: HOME | End: 2019-08-02
Payer: MEDICARE

## 2019-08-02 DIAGNOSIS — M81.0 OSTEOPOROSIS, UNSPECIFIED OSTEOPOROSIS TYPE, UNSPECIFIED PATHOLOGICAL FRACTURE PRESENCE: ICD-10-CM

## 2019-08-02 PROCEDURE — 77080 DXA BONE DENSITY AXIAL: CPT

## 2019-08-05 ENCOUNTER — TELEPHONE (OUTPATIENT)
Dept: FAMILY MEDICINE CLINIC | Facility: MEDICAL CENTER | Age: 64
End: 2019-08-05

## 2019-08-05 NOTE — TELEPHONE ENCOUNTER
When on the prednisone it seemed to get better but now its the same    The right leg seems to be effected , pain and feels like it is asleep , radiates all the way down to the toes, this comes and goes

## 2019-08-05 NOTE — TELEPHONE ENCOUNTER
Pt called to see if recent xray results were available yet  Please call her if they are  Also, she wanted Dr Priscilla Treadwell to know that she is still hurting, even though she has finished the prednisone

## 2019-08-05 NOTE — TELEPHONE ENCOUNTER
Have her symptoms changed at all? X-rays showed degenerative changes in lumbar spine as she has had before as well as osteoarthritis of the hip  Any possibly this could be kidney stone?

## 2019-08-06 ENCOUNTER — NURSE TRIAGE (OUTPATIENT)
Dept: PHYSICAL THERAPY | Facility: OTHER | Age: 64
End: 2019-08-06

## 2019-08-06 DIAGNOSIS — M47.816 LUMBAR ARTHROPATHY: ICD-10-CM

## 2019-08-06 DIAGNOSIS — M46.1 SACROILIITIS (HCC): ICD-10-CM

## 2019-08-06 DIAGNOSIS — M54.50 ACUTE LEFT-SIDED LOW BACK PAIN WITHOUT SCIATICA: Primary | ICD-10-CM

## 2019-08-06 DIAGNOSIS — M48.50XA COMPRESSION FRACTURE OF SPINE (HCC): Primary | ICD-10-CM

## 2019-08-06 DIAGNOSIS — M25.552 HIP PAIN, ACUTE, LEFT: ICD-10-CM

## 2019-08-06 NOTE — TELEPHONE ENCOUNTER
Background - Initial Assessment  Clinical complaint: Left hip into groin pain down left leg denies numbness/tingling  Date of onset: 1mth ago and progressively worsened  Mechanism of injury: no known injury    Previous Treatment - Previous Treatment  Previous evaluation: none  Current provider: PCP  Diagnostics: xray-comp fx shown  Previous treatment: motrin, tylenol, flexeril, ice, heat-all ineffective    Protocols used:  AMB COMPREHENSIVE SPINE PROGRAM PROTOCOL    Patients information was sent via email to scheduling desk  Pt was informed that she would be receiving a call to schedule her physical therapy eval appointment  Pt agreed and appreciative      Referral closed

## 2019-08-06 NOTE — TELEPHONE ENCOUNTER
Are you sure you are not referring to the left leg?    Needs referral to comprehensive spine; let us know when the appointment is

## 2019-08-08 DIAGNOSIS — M54.42 ACUTE LEFT-SIDED LOW BACK PAIN WITH LEFT-SIDED SCIATICA: ICD-10-CM

## 2019-08-08 DIAGNOSIS — M25.552 HIP PAIN, ACUTE, LEFT: ICD-10-CM

## 2019-08-08 NOTE — TELEPHONE ENCOUNTER
Cindy Ray would like a refill to  for Oxycodone Rx previously for just a few more pills to get her through to her appointment on Monday 12th with the spine center  Please let her know if able to get

## 2019-08-09 DIAGNOSIS — M54.42 ACUTE LEFT-SIDED LOW BACK PAIN WITH LEFT-SIDED SCIATICA: ICD-10-CM

## 2019-08-09 DIAGNOSIS — M25.552 HIP PAIN, ACUTE, LEFT: ICD-10-CM

## 2019-08-09 RX ORDER — OXYCODONE HYDROCHLORIDE AND ACETAMINOPHEN 5; 325 MG/1; MG/1
1 TABLET ORAL EVERY 4 HOURS PRN
Qty: 30 TABLET | Refills: 0 | Status: SHIPPED | OUTPATIENT
Start: 2019-08-09 | End: 2019-08-09 | Stop reason: SDUPTHER

## 2019-08-09 RX ORDER — OXYCODONE HYDROCHLORIDE AND ACETAMINOPHEN 5; 325 MG/1; MG/1
1 TABLET ORAL EVERY 4 HOURS PRN
Qty: 30 TABLET | Refills: 0 | Status: SHIPPED | OUTPATIENT
Start: 2019-08-09 | End: 2019-09-15 | Stop reason: SDUPTHER

## 2019-08-09 NOTE — TELEPHONE ENCOUNTER
Dr Vernon Winn ok'd however did not sign the Rx Thursday evening  Dr Demarco Can sent it electronically Friday   Pt aware

## 2019-08-12 ENCOUNTER — EVALUATION (OUTPATIENT)
Dept: PHYSICAL THERAPY | Age: 64
End: 2019-08-12
Payer: MEDICARE

## 2019-08-12 VITALS — HEART RATE: 104 BPM | SYSTOLIC BLOOD PRESSURE: 118 MMHG | DIASTOLIC BLOOD PRESSURE: 72 MMHG | TEMPERATURE: 97.9 F

## 2019-08-12 DIAGNOSIS — M54.50 ACUTE LEFT-SIDED LOW BACK PAIN WITHOUT SCIATICA: Primary | ICD-10-CM

## 2019-08-12 DIAGNOSIS — M25.552 LEFT HIP PAIN: ICD-10-CM

## 2019-08-12 PROCEDURE — 97110 THERAPEUTIC EXERCISES: CPT | Performed by: PHYSICAL THERAPIST

## 2019-08-12 PROCEDURE — 97162 PT EVAL MOD COMPLEX 30 MIN: CPT | Performed by: PHYSICAL THERAPIST

## 2019-08-12 NOTE — PROGRESS NOTES
PT Evaluation     Today's date: 2019  Patient name: Sven Whittaker  : 1955  MRN: [id number]  Referring provider: Justin Claros MD  Dx:   Encounter Diagnosis     ICD-10-CM    1  Acute left-sided low back pain without sciatica M54 5 Ambulatory referral to PT spine   2  Left hip pain M25 552        Start Time: 1100  Stop Time: 1200  Total time in clinic (min): 60 minutes    Assessment  Assessment details: Tamika Ayala is a 71 y o female that presents today with clinical signs and symptoms consistent with a potential hip pathology leading to severe groin discomfort with walking, performing transfers, and other ADLs  It is unclear through mechanical testing if lumbar is causing her symptom reporting  She was referred through [address] [place] [address] spine program and according to startBack tool fits the high risk category expecting 12-18 visits to be performed  She presents with significant hip PROM limitations < 5 degrees in internal and external rotation each and straight leg raise not improved with bending the knee  No referral is being placed at this time, however if patient does not progress as anticipated, a referral for orthopedic consultation will be made  Prognosis is fair to good given HEP and PT 2-3x/week  Positive prognostic indicators include motivation to recovery     Impairments: abnormal muscle firing, abnormal or restricted ROM, abnormal movement, impaired physical strength, lacks appropriate home exercise program, pain with function and poor posture   Understanding of Dx/Px/POC: good   Prognosis: fair    Plan  Plan details: 12-18 visits  Patient would benefit from: skilled physical therapy  Planned modality interventions: thermotherapy: hydrocollator packs  Planned therapy interventions: flexibility, functional ROM exercises, graded activity, graded exercise, home exercise program, therapeutic exercise, therapeutic activities, stretching, strengthening, patient education, neuromuscular re-education, manual therapy, joint mobilization and abdominal trunk stabilization  Frequency: 2x week  Duration in weeks: 8  Plan of Care beginning date: 2019  Plan of Care expiration date: 10/7/2019  Treatment plan discussed with: patient        Subjective Evaluation    History of Present Illness  Mechanism of injury: Pt presents today stating that she has had symptoms in her left thigh, groin that radiates into her lat/medial thigh that has been worsening over the past few months  Pt reports that she has sharp pain that stops just above the knee located medially and laterally that started insidiously  Pt states that she is unable to sleep on her left side  She also reports that she has discomfort when sitting for a long period of time and upon standing her symptoms in her groin and left buttock are relieved until she is walking for a long period of time  Pt states that when she gets into the car she must use her arm to assist in lifting her leg due to the sharp, shooting pain into her groin  Pt denies any B/B changes, saddle paresthesia, and increase symptoms with coughing/sneezing at this time  Not a recurrent problem   Quality of life: good    Pain  Current pain ratin  At best pain rating: 3  At worst pain ratin  Quality: radiating and burning  Aggravating factors: sitting  Progression: worsening    Treatments  Previous treatment: medication (steroids)  Patient Goals  Patient goals for therapy: decreased pain, increased motion and return to sport/leisure activities  Patient goal: getting back to babysitting grandchildren, walking dog, riding stationary bicycle        Objective  GAIT: Reduced left single leg stance with trendelenburg gait pattern  Squat assess: WNL      Lumbar  % of normal   Flex  100   Extn  50 p! SB Left 100   SB Right 100   ROT Left 100 p! ROT Right 100 p!    Repetitive testing: extension= n/p Flexion= no effect      MMT         AROM          PROM    Hip       L R        L           R      L     R   Flex  4 4+ 90 p! WNL 70 115   Extn  3+p! 3+ 5 10 n/t n/t   Abd  3+ p! 3+ p! 8p! WNL 10p! WNL   Add  3+ p! 3+p! IR  2 4 5p! WNL 5p! 40   ER  2 4 5p! WNL 5p! 25                 MMT    Knee         L        R   Flex  4 p! 5   Extn  5 5          P!: right inguinal region    Palpation: TTP R Rectus Femoris, L4-S1  Dermatome: (pinprick- L/R):  intact     Reflexes:  (L/R) L4:  2+ BLE  S1:   BLE      Slump test: L=   -  R=   -                 Hip/SI joint:   scour=  +     rosemary =+    long axis distraction (hip) =  Worsened symptoms   Active SLR= +  Pain in groin LLE     Hip abd/lat rot  =    + groin pain     prone knee flexion= +  Joint mobility:  Unremarkable    Movement analysis: pain with transitional rolling on plinth, no pain with squatting, pain with active hip extension, and severe pain noted with standing lumbar extension and right rotation          Flowsheet Rows      Most Recent Value   PT/OT G-Codes   Current Score  51   Projected Score  66   FOTO information reviewed  Yes      Precautions: Osteoporosis, GERD, hx of T12 compression fx, vit  D deficiency    Manual                                          Exercise Diary 8/12       Quad rocks 2x10       Glute sets 2x10, 5"       Hip flex stretch 10x10"                                                         Patient provided verbal consent to treatment plan and recommended interventions  Modalities                          Short Term:  1  Pt will report decreased levels of pain by at least 2 subjective ratings in 4 weeks  2  Pt will demonstrate improved ROM by at least 10 degrees in 4 weeks  3  Pt will demonstrate improved strength by 1/2 grade in 4 weeks  4  Pt will be able to perform transfers without pain in 4 weeks  Long Term:   1  Pt will be independent in their HEP in 8 weeks  2  Pt will be pain free with IADL's in 8 weeks  3  Pt will demonstrate improved FOTO > 15 points in 8 weeks    4  Pt will be able to walk dog > 15 minutes without symptoms in 8 weeks

## 2019-08-13 PROBLEM — Z51.81 ENCOUNTER FOR MONITORING DENOSUMAB THERAPY: Status: ACTIVE | Noted: 2019-08-13

## 2019-08-13 PROBLEM — Z79.899 ENCOUNTER FOR MONITORING DENOSUMAB THERAPY: Status: ACTIVE | Noted: 2019-08-13

## 2019-08-13 PROBLEM — Z79.620 ENCOUNTER FOR MONITORING DENOSUMAB THERAPY: Status: ACTIVE | Noted: 2019-08-13
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
## 2019-08-16 ENCOUNTER — OFFICE VISIT (OUTPATIENT)
Dept: RHEUMATOLOGY | Facility: CLINIC | Age: 64
End: 2019-08-16
Payer: MEDICARE

## 2019-08-16 ENCOUNTER — APPOINTMENT (OUTPATIENT)
Dept: PHYSICAL THERAPY | Age: 64
End: 2019-08-16
Payer: MEDICARE

## 2019-08-16 VITALS
WEIGHT: 138.45 LBS | HEART RATE: 88 BPM | DIASTOLIC BLOOD PRESSURE: 72 MMHG | HEIGHT: 64 IN | BODY MASS INDEX: 23.64 KG/M2 | SYSTOLIC BLOOD PRESSURE: 112 MMHG

## 2019-08-16 DIAGNOSIS — Z79.899 ENCOUNTER FOR MONITORING DENOSUMAB THERAPY: ICD-10-CM

## 2019-08-16 DIAGNOSIS — M15.0 PRIMARY GENERALIZED (OSTEO)ARTHRITIS: ICD-10-CM

## 2019-08-16 DIAGNOSIS — M81.8 OTHER OSTEOPOROSIS WITHOUT CURRENT PATHOLOGICAL FRACTURE: Primary | ICD-10-CM

## 2019-08-16 DIAGNOSIS — M25.552 LEFT HIP PAIN: ICD-10-CM

## 2019-08-16 DIAGNOSIS — Z51.81 ENCOUNTER FOR MONITORING DENOSUMAB THERAPY: ICD-10-CM

## 2019-08-16 PROCEDURE — 99214 OFFICE O/P EST MOD 30 MIN: CPT | Performed by: PHYSICIAN ASSISTANT

## 2019-08-16 PROCEDURE — 96372 THER/PROPH/DIAG INJ SC/IM: CPT | Performed by: PHYSICIAN ASSISTANT

## 2019-08-16 PROCEDURE — 20610 DRAIN/INJ JOINT/BURSA W/O US: CPT | Performed by: PHYSICIAN ASSISTANT

## 2019-08-16 RX ORDER — METHYLPREDNISOLONE ACETATE 40 MG/ML
40 INJECTION, SUSPENSION INTRA-ARTICULAR; INTRALESIONAL; INTRAMUSCULAR; SOFT TISSUE ONCE
Status: COMPLETED | OUTPATIENT
Start: 2019-08-16 | End: 2019-08-16

## 2019-08-16 RX ORDER — BUPIVACAINE HYDROCHLORIDE 2.5 MG/ML
1 INJECTION, SOLUTION INFILTRATION; PERINEURAL ONCE
Status: COMPLETED | OUTPATIENT
Start: 2019-08-16 | End: 2019-08-16

## 2019-08-16 RX ORDER — BUPIVACAINE HYDROCHLORIDE 2.5 MG/ML
1 INJECTION, SOLUTION INFILTRATION; PERINEURAL
Status: COMPLETED | OUTPATIENT
Start: 2019-08-16 | End: 2019-08-16

## 2019-08-16 RX ORDER — METHYLPREDNISOLONE ACETATE 40 MG/ML
1 INJECTION, SUSPENSION INTRA-ARTICULAR; INTRALESIONAL; INTRAMUSCULAR; SOFT TISSUE
Status: COMPLETED | OUTPATIENT
Start: 2019-08-16 | End: 2019-08-16

## 2019-08-16 RX ADMIN — METHYLPREDNISOLONE ACETATE 40 MG: 40 INJECTION, SUSPENSION INTRA-ARTICULAR; INTRALESIONAL; INTRAMUSCULAR; SOFT TISSUE at 08:02

## 2019-08-16 RX ADMIN — BUPIVACAINE HYDROCHLORIDE 1 ML: 2.5 INJECTION, SOLUTION INFILTRATION; PERINEURAL at 07:57

## 2019-08-16 RX ADMIN — BUPIVACAINE HYDROCHLORIDE 1 ML: 2.5 INJECTION, SOLUTION INFILTRATION; PERINEURAL at 08:00

## 2019-08-16 RX ADMIN — METHYLPREDNISOLONE ACETATE 1 ML: 40 INJECTION, SUSPENSION INTRA-ARTICULAR; INTRALESIONAL; INTRAMUSCULAR; SOFT TISSUE at 07:57

## 2019-08-17 DIAGNOSIS — M62.838 MUSCLE SPASM: ICD-10-CM

## 2019-08-18 RX ORDER — CYCLOBENZAPRINE HCL 5 MG
TABLET ORAL
Qty: 90 TABLET | Refills: 0 | Status: SHIPPED | OUTPATIENT
Start: 2019-08-18 | End: 2020-01-25

## 2019-08-19 ENCOUNTER — APPOINTMENT (OUTPATIENT)
Dept: PHYSICAL THERAPY | Age: 64
End: 2019-08-19
Payer: MEDICARE

## 2019-08-20 ENCOUNTER — APPOINTMENT (OUTPATIENT)
Dept: PHYSICAL THERAPY | Age: 64
End: 2019-08-20
Payer: MEDICARE

## 2019-08-21 NOTE — PROGRESS NOTES
Patient called to cancel future appts  Rheumatologist gave injection to hip and patient said she was feeling better

## 2019-08-22 DIAGNOSIS — K21.9 GASTROESOPHAGEAL REFLUX DISEASE, ESOPHAGITIS PRESENCE NOT SPECIFIED: ICD-10-CM

## 2019-08-23 ENCOUNTER — APPOINTMENT (OUTPATIENT)
Dept: PHYSICAL THERAPY | Age: 64
End: 2019-08-23
Payer: MEDICARE

## 2019-08-23 RX ORDER — OMEPRAZOLE 20 MG/1
CAPSULE, DELAYED RELEASE ORAL
Qty: 90 CAPSULE | Refills: 1 | Status: SHIPPED | OUTPATIENT
Start: 2019-08-23 | End: 2020-02-10

## 2019-08-26 ENCOUNTER — APPOINTMENT (OUTPATIENT)
Dept: PHYSICAL THERAPY | Age: 64
End: 2019-08-26
Payer: MEDICARE

## 2019-08-30 ENCOUNTER — APPOINTMENT (OUTPATIENT)
Dept: PHYSICAL THERAPY | Age: 64
End: 2019-08-30
Payer: MEDICARE

## 2019-09-15 ENCOUNTER — APPOINTMENT (EMERGENCY)
Dept: RADIOLOGY | Facility: HOSPITAL | Age: 64
End: 2019-09-15
Payer: MEDICARE

## 2019-09-15 ENCOUNTER — HOSPITAL ENCOUNTER (EMERGENCY)
Facility: HOSPITAL | Age: 64
Discharge: HOME | End: 2019-09-15
Attending: EMERGENCY MEDICINE | Admitting: EMERGENCY MEDICINE
Payer: MEDICARE

## 2019-09-15 VITALS
OXYGEN SATURATION: 99 % | WEIGHT: 134.26 LBS | TEMPERATURE: 98.6 F | RESPIRATION RATE: 18 BRPM | HEART RATE: 84 BPM | SYSTOLIC BLOOD PRESSURE: 146 MMHG | DIASTOLIC BLOOD PRESSURE: 62 MMHG | BODY MASS INDEX: 23.05 KG/M2

## 2019-09-15 DIAGNOSIS — M25.552 LEFT HIP PAIN: Primary | ICD-10-CM

## 2019-09-15 DIAGNOSIS — M25.552 HIP PAIN, ACUTE, LEFT: ICD-10-CM

## 2019-09-15 DIAGNOSIS — M54.42 ACUTE LEFT-SIDED LOW BACK PAIN WITH LEFT-SIDED SCIATICA: ICD-10-CM

## 2019-09-15 PROCEDURE — 96372 THER/PROPH/DIAG INJ SC/IM: CPT

## 2019-09-15 PROCEDURE — 99283 EMERGENCY DEPT VISIT LOW MDM: CPT

## 2019-09-15 PROCEDURE — 99285 EMERGENCY DEPT VISIT HI MDM: CPT | Performed by: EMERGENCY MEDICINE

## 2019-09-15 PROCEDURE — 73502 X-RAY EXAM HIP UNI 2-3 VIEWS: CPT

## 2019-09-15 RX ORDER — OXYCODONE HYDROCHLORIDE AND ACETAMINOPHEN 5; 325 MG/1; MG/1
1 TABLET ORAL EVERY 4 HOURS PRN
Qty: 16 TABLET | Refills: 0 | Status: SHIPPED | OUTPATIENT
Start: 2019-09-15 | End: 2019-09-19 | Stop reason: ALTCHOICE

## 2019-09-15 RX ORDER — LIDOCAINE 50 MG/G
1 PATCH TOPICAL ONCE
Status: DISCONTINUED | OUTPATIENT
Start: 2019-09-15 | End: 2019-09-15 | Stop reason: HOSPADM

## 2019-09-15 RX ORDER — LIDOCAINE 50 MG/G
1 PATCH TOPICAL DAILY
Qty: 7 PATCH | Refills: 0 | Status: SHIPPED | OUTPATIENT
Start: 2019-09-15 | End: 2019-09-19 | Stop reason: ALTCHOICE

## 2019-09-15 RX ORDER — MORPHINE SULFATE 4 MG/ML
4 INJECTION, SOLUTION INTRAMUSCULAR; INTRAVENOUS ONCE
Status: COMPLETED | OUTPATIENT
Start: 2019-09-15 | End: 2019-09-15

## 2019-09-15 RX ADMIN — MORPHINE SULFATE 4 MG: 4 INJECTION INTRAVENOUS at 16:48

## 2019-09-15 RX ADMIN — LIDOCAINE 1 PATCH: 50 PATCH CUTANEOUS at 17:55
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
## 2019-09-15 NOTE — ED ATTENDING ATTESTATION
Chao Sarmiento MD, saw and evaluated the patient  I have discussed the patient with the resident/non-physician practitioner and agree with the resident's/non-physician practitioner's findings, Plan of Care, and MDM as documented in the resident's/non-physician practitioner's note, except where noted  All available labs and Radiology studies were reviewed  I was present for key portions of any procedure(s) performed by the resident/non-physician practitioner and I was immediately available to provide assistance  At this point I agree with the current assessment done in the Emergency Department  I have conducted an independent evaluation of this patient a history and physical is as follows:    Patient is a 59-year-old female who presents to the emergency department for evaluation with severe left hip pain  She has been having ongoing trouble with the left hip since late July of this year  Pain became severe yesterday without clear provocation  She notes that she did carry a few tubs upstairs and wonders whether this may have aggravated things  She gestures over the lateral aspect of the left hip as the site of greatest discomfort  She weighs down the thigh indicating the entire thigh is uncomfortable as well  She remains able to bear weight with extreme pain  She has been taking ibuprofen without relief  She does have a history of osteoporosis and remote thoracic compression fracture  She has additionally been diagnosed with arthritis of the hip joint  Patient records reviewed  Patient seen at her PCP office in late July  She was prescribed NSAID, steroid taper and short course of opioids  She continued with significant discomfort and was referred to physical therapy  Physical therapy note indicated that likely 12 to 18 sessions would be anticipated  Patient relates that she felt too uncomfortable to perform the activities advised    She did follow up with her rheumatologist to treat her osteoporosis, underwent injection of left trochanteric bursa, and was referred to an orthopedic specialist whom she saw on August 30th (Dr Lawyer Pinto)  Imaging of the L-spine and left hip were reviewed  Patient diagnosed with end-stage joint disease affecting the left hip  Patient notes that she was advised to hip replacement would be necessary though no follow-up care was coordinated for this  She and her  notes that they would prefer to have providers in the [facility]'s system of possible  There has been no trauma to the hip, fevers, other joint issues    On initial exam patient appeared extremely uncomfortable writhing on the bed in pain  On subsequent evaluation following IM medication and repositioning with weight application to the right hip patient appeared comfortable  Heart sounds regular  Lungs clear to auscultation  Abdomen soft & NT  No thoracic, lumbar or sacral midline tenderness  There is tenderness over the left sacrum  The left hip is tender diffusely and there is mild tenderness over the left dry circumferentially  The left calf is nontender  There are no overlying skin changes  Patient with 5/5 strength on left and right hip flexion  She flexes the left hip to a lower height   5/5 strength with dorsi and plantar flexion  Sensation grossly intact in the lower legs  Plus two PT pulses bilaterally  Passive ranging of the left hip is quite uncomfortable for the patient  Negative straight leg test with elevation of the right leg  Patient with exacerbation of discomfort upon raising the left leg approximately 20 degrees  Findings c/w OA of the L hip  Xray performed given hx of osteoporosis to assess for fx though this was felt to be less likely  Encouraged F/U w/ Ortho  - providing [facility] info as desired  Will + short term analgesics   ( Aware was reviewed )        Critical Care Time  Procedures

## 2019-09-19 ENCOUNTER — ANNUAL EXAM (OUTPATIENT)
Dept: FAMILY MEDICINE CLINIC | Facility: MEDICAL CENTER | Age: 64
End: 2019-09-19
Payer: MEDICARE

## 2019-09-19 VITALS
HEART RATE: 96 BPM | SYSTOLIC BLOOD PRESSURE: 132 MMHG | RESPIRATION RATE: 16 BRPM | BODY MASS INDEX: 23.05 KG/M2 | HEIGHT: 64 IN | DIASTOLIC BLOOD PRESSURE: 80 MMHG | WEIGHT: 135 LBS

## 2019-09-19 DIAGNOSIS — K21.9 GASTROESOPHAGEAL REFLUX DISEASE, ESOPHAGITIS PRESENCE NOT SPECIFIED: ICD-10-CM

## 2019-09-19 DIAGNOSIS — Z23 NEED FOR IMMUNIZATION AGAINST INFLUENZA: Primary | ICD-10-CM

## 2019-09-19 DIAGNOSIS — Z01.419 ENCOUNTER FOR GYNECOLOGICAL EXAMINATION WITHOUT ABNORMAL FINDING: ICD-10-CM

## 2019-09-19 DIAGNOSIS — N83.202 BILATERAL OVARIAN CYSTS: ICD-10-CM

## 2019-09-19 DIAGNOSIS — Z23 NEED FOR PNEUMOCOCCAL VACCINE: ICD-10-CM

## 2019-09-19 DIAGNOSIS — M81.8 OTHER OSTEOPOROSIS WITHOUT CURRENT PATHOLOGICAL FRACTURE: ICD-10-CM

## 2019-09-19 DIAGNOSIS — Z12.39 SCREENING FOR BREAST CANCER: ICD-10-CM

## 2019-09-19 DIAGNOSIS — M54.42 ACUTE LEFT-SIDED LOW BACK PAIN WITH LEFT-SIDED SCIATICA: ICD-10-CM

## 2019-09-19 DIAGNOSIS — E78.01 FAMILIAL HYPERCHOLESTEROLEMIA: ICD-10-CM

## 2019-09-19 DIAGNOSIS — N83.201 BILATERAL OVARIAN CYSTS: ICD-10-CM

## 2019-09-19 PROBLEM — N83.209 OVARIAN CYST: Status: ACTIVE | Noted: 2019-09-19

## 2019-09-19 PROCEDURE — G0145 SCR C/V CYTO,THINLAYER,RESCR: HCPCS | Performed by: FAMILY MEDICINE

## 2019-09-19 PROCEDURE — G0008 ADMIN INFLUENZA VIRUS VAC: HCPCS

## 2019-09-19 PROCEDURE — 90670 PCV13 VACCINE IM: CPT

## 2019-09-19 PROCEDURE — G0009 ADMIN PNEUMOCOCCAL VACCINE: HCPCS

## 2019-09-19 PROCEDURE — 87624 HPV HI-RISK TYP POOLED RSLT: CPT | Performed by: FAMILY MEDICINE

## 2019-09-19 PROCEDURE — G0101 CA SCREEN;PELVIC/BREAST EXAM: HCPCS

## 2019-09-19 PROCEDURE — 90662 IIV NO PRSV INCREASED AG IM: CPT

## 2019-09-19 RX ORDER — OXYCODONE HYDROCHLORIDE AND ACETAMINOPHEN 5; 325 MG/1; MG/1
1 TABLET ORAL EVERY 4 HOURS PRN
Qty: 20 TABLET | Refills: 0 | Status: SHIPPED | OUTPATIENT
Start: 2019-09-19 | End: 2019-09-30 | Stop reason: SDUPTHER

## 2019-09-19 NOTE — PROGRESS NOTES
Monie Yi is her for Gyn exam   She was seen in this office in July for left back and left lower extremity pain  Diagnosed with sciatica and treated with steroids  She had some improvement  X-rays showed degenerative changes in hip and spine  She was referred to Orthopedics  She went to [address] [id number]  and saw Dory Day  However she said she did not see the doctor but saw a female  She was told she has severe osteoarthritis in needed hip replacement  She was also referred to the [facility] spine center and was sent to physical therapy but patient states that she could not tolerate the physical therapy  She had recurrence of the severe pain in the left hip and left groin and lower buttock and was seen in the emergency room on 09/15  She was given prescription for Lidoderm patch as well as for Percocet  Referred back to Orthopedics  She has anBur appointment with Dr Long this week  She requests a prescription for more Percocet until the appointment  She said that she gets a reaction to tramadol  Gyn  No vaginal discharge pain bleeding  Sexually active with no dyspareunia  No incontinence  She is taking omeprazole  Gets heartburn symptoms if does not take it  Trried Zantac; didn't work    O: /80 (Cuff Size: Standard)   Pulse 96   Resp 16   Ht 5' 3 5" (1 613 m)   Wt 61 2 kg (135 lb)   BMI 23 54 kg/m²   Physical Exam   Constitutional: She appears well-developed and well-nourished  No distress  Neck: Carotid bruit is not present  No thyromegaly present  Cardiovascular: Normal rate, regular rhythm, normal heart sounds and intact distal pulses  Pulmonary/Chest: Effort normal and breath sounds normal    Abdominal: Soft  Bowel sounds are normal  She exhibits no mass  There is no hepatomegaly  There is no tenderness  Musculoskeletal: She exhibits no edema  Lower extremities show reduced internal and external rotation with the left lower extremity with significant pain    She has a positive straight leg raising on the left at 45°  Lymphadenopathy:     She has no cervical adenopathy  Breasts no masses or discharge  External genitalia normal  Vagina normal  Cervix normal no lesions  Uterus normal size shape and consistency  Adnexae non palpable  Rectal exam no masses stool  heme negative    Assessment  1  HM-updated immunizations  Advised pneumonia shots, flu shot  Had  Zostavax 2017; declines Shingrix for now  Other screenings up to date  2  GERD-controlled with omeprazole; unable to stop PPI  3  Postmenopausal female-normal gyn exam  4  Osteoporosis-on Prolia per Rheumatology  5  Left lower extremity/back/hip pain-suspect both lumbar radiculopathy as well as osteoarthritis for hip-will be seeing Orthopedics to further evaluate this  If given her a small prescription for Percocet in the meantime as she is allergic to tramadolN and she has had no response to NSAIDS  Plan  Prevnar  Flu shot  Check Pap smear  BW  Mammogram   F/u with Ortho as above

## 2019-09-23 LAB
HPV HR 12 DNA CVX QL NAA+PROBE: NEGATIVE
HPV16 DNA CVX QL NAA+PROBE: NEGATIVE
HPV18 DNA CVX QL NAA+PROBE: NEGATIVE

## 2019-09-24 LAB
LAB AP GYN PRIMARY INTERPRETATION: NORMAL
Lab: NORMAL

## 2019-09-28 ENCOUNTER — OFFICE VISIT (OUTPATIENT)
Dept: OBGYN CLINIC | Facility: MEDICAL CENTER | Age: 64
End: 2019-09-28
Payer: MEDICARE

## 2019-09-28 VITALS
BODY MASS INDEX: 23.18 KG/M2 | DIASTOLIC BLOOD PRESSURE: 91 MMHG | HEIGHT: 64 IN | SYSTOLIC BLOOD PRESSURE: 130 MMHG | HEART RATE: 112 BPM | WEIGHT: 135.8 LBS

## 2019-09-28 DIAGNOSIS — M51.36 LUMBAR DEGENERATIVE DISC DISEASE: ICD-10-CM

## 2019-09-28 DIAGNOSIS — M16.12 PRIMARY OSTEOARTHRITIS OF LEFT HIP: ICD-10-CM

## 2019-09-28 DIAGNOSIS — M25.552 PAIN IN LEFT HIP: Primary | ICD-10-CM

## 2019-09-28 PROCEDURE — 99204 OFFICE O/P NEW MOD 45 MIN: CPT | Performed by: EMERGENCY MEDICINE

## 2019-09-28 RX ORDER — MELOXICAM 7.5 MG/1
7.5 TABLET ORAL DAILY
Qty: 30 TABLET | Refills: 0 | Status: SHIPPED | OUTPATIENT
Start: 2019-09-28 | End: 2019-12-31 | Stop reason: SDUPTHER

## 2019-09-28 NOTE — PATIENT INSTRUCTIONS
Meloxicam (By mouth)   Meloxicam ([id number])  Treats symptoms of osteoarthritis (OA) and rheumatoid arthritis (RA)  This medicine is an NSAID  Brand Name(s): Comfort Pac with Meloxicam, Mobic, Vivlodex   There may be other brand names for this medicine  When This Medicine Should Not Be Used: This medicine is not right for everyone  Do not use it if you had an allergic reaction to meloxicam or another NSAID drug, including aspirin  How to Use This Medicine:   Capsule, Liquid, Tablet  · Take your medicine as directed  Your dose may need to be changed several times to find what works best for you  · You may take this medicine with or without food  If this medicine upsets your stomach, take it with food or milk  · Capsule or tablet: Swallow whole  Do not crush, break, or chew it  · Oral liquid: Shake the bottle gently before use  Measure the oral liquid medicine with a marked measuring spoon, oral syringe, or medicine cup  · This medicine should come with a Medication Guide  Ask your pharmacist for a copy if you do not have one  · Missed dose: Take a dose as soon as you remember  If it is almost time for your next dose, wait until then and take a regular dose  Do not take extra medicine to make up for a missed dose  · Store the medicine in a closed container at room temperature, away from heat, moisture, and direct light  Drugs and Foods to Avoid:   Ask your doctor or pharmacist before using any other medicine, including over-the-counter medicines, vitamins, and herbal products  · Do not use aspirin or any other NSAID medicine (including diflunisal, salsalate) unless your doctor says it is okay  · Do not take the oral liquid together with sodium polystyrene sulfonate  Meloxicam oral liquid contains sorbitol, which may cause a serious bowel problem if taken with sodium polystyrene sulfonate  · Some medicines can affect how meloxicam works   Tell your doctor if you are using any of the following:  ¨ Cholestyramine, cyclosporine, digoxin, lithium, methotrexate, pemetrexed  ¨ Blood pressure medicine  ¨ Blood thinner (including warfarin)  ¨ Diuretic (water pill)  ¨ Medicine to treat depression  ¨ Steroid medicine (including hydrocortisone, methylprednisolone, prednisolone, prednisone)  Warnings While Using This Medicine:   · Tell your doctor if you are pregnant or breastfeeding  Do not use this medicine during the later part of a pregnancy unless your doctor tells you to  · Tell your doctor if you have kidney disease, liver disease, asthma, blood circulation problems, heart disease, high blood pressure, heart failure, or a history of ulcers or other stomach problems  Tell your doctor if you smoke or drink alcohol regularly  · This medicine may cause the following problems:  ¨ High risk of blood clots, heart attack, stroke, or heart failure  ¨ High risk of stomach or bowel bleeding  ¨ High blood pressure  ¨ Liver or kidney problems  ¨ High potassium levels in the blood  ¨ Serious skin reactions  · Tell your doctor if you are trying to get pregnant  Some women who take this medicine have trouble getting pregnant  · Your doctor will do lab tests at regular visits to check on the effects of this medicine  Keep all appointments  · Keep all medicine out of the reach of children  Never share your medicine with anyone    Possible Side Effects While Using This Medicine:   Call your doctor right away if you notice any of these side effects:  · Allergic reaction: Itching or hives, swelling in your face or hands, swelling or tingling in your mouth or throat, chest tightness, trouble breathing  · Blistering, peeling, red skin rash  · Change in how much or how often you urinate, painful urination  · Chest pain, trouble breathing, coughing up blood  · Dark urine or pale stools, nausea, vomiting, loss of appetite, stomach pain, yellow skin or eyes  · Numbness or weakness on one side of your body, sudden or severe headache, problems with vision, speech, or walking  · Rapid weight gain, swelling in your hands, ankles, or feet  · Severe stomach pain, vomiting blood or material that looks like coffee grounds, bloody or black, tarry stools  · Unusual bleeding, bruising, or weakness  If you notice these less serious side effects, talk with your doctor:   · Mild nausea, diarrhea, heartburn  If you notice other side effects that you think are caused by this medicine, tell your doctor  Call your doctor for medical advice about side effects  You may report side effects to FDA at 1-691-FDA-7066  © 2017 [address] [employer] Information is for End User's use only and may not be sold, redistributed or otherwise used for commercial purposes  The above information is an  only  It is not intended as medical advice for individual conditions or treatments  Talk to your doctor, nurse or pharmacist before following any medical regimen to see if it is safe and effective for you  Arthritis   AMBULATORY CARE:   Arthritis  is a disease that causes inflammation in one or more joints  There are many types of arthritis, such as osteoarthritis, rheumatoid arthritis, and septic arthritis  Some types cause inflammation in the joints  Other types wear away the cartilage between joints  This makes the bones of the joint rub together when you move the joint  Your symptoms may be constant, or symptoms may come and go  Arthritis often gets worse over time and can cause permanent joint damage  Common signs and symptoms of arthritis:   · Pain, swelling, or stiffness in the joint    · Limited range of motion in the joint    · Warmth or redness over the joint    · Tenderness when you touch the joint    · Stiff joints in the morning that loosen with movement    · A creaking or grinding sound when you move the joint    · Fever  Seek care immediately if:   · You have a fever and severe joint pain or swelling       · You cannot move the affected joint  · You have severe joint pain you cannot tolerate  Contact your healthcare provider if:   · Your pain or swelling does not get better with treatment  · You have questions or concerns about your condition or care  Treatment  will depend on the type of arthritis you have and if it is severe  You may need any of the following:  · Acetaminophen  decreases pain and fever  It is available without a doctor's order  Ask how much to take and how often to take it  Follow directions  Acetaminophen can cause liver damage if not taken correctly  · NSAIDs , such as ibuprofen, help decrease swelling, pain, and fever  This medicine is available with or without a doctor's order  NSAIDs can cause stomach bleeding or kidney problems in certain people  If you take blood thinner medicine, always ask your healthcare provider if NSAIDs are safe for you  Always read the medicine label and follow directions  · Steroid medicine  helps reduce swelling and pain  · Surgery  may be needed to repair or replace a damaged joint  Manage arthritis:   · Rest your painful joint so it can heal   Your healthcare provider may recommend crutches or a walker if the affected joint is in a leg  · Apply ice or heat to the joint  Both can help decrease swelling and pain  Ice may also help prevent tissue damage  Use an ice pack, or put crushed ice in a plastic bag  Cover it with a towel and place it on your joint for 15 to 20 minutes every hour or as directed  You can apply heat for 20 minutes every 2 hours  Heat treatment includes hot packs or heat lamps  · Elevate your joint  Elevation helps reduce swelling and pain  Raise your joint above the level of your heart as often as you can  Prop your painful joint on pillows to keep it above your heart comfortably  · Go to physical or occupational therapy as directed  A physical therapist can teach you exercises to improve flexibility and range of motion   You may also be shown non-weight-bearing exercises that are safe for your joints, such as swimming  Exercise can help keep your joints flexible and reduce pain  An occupational therapist can help you learn to do your daily activities when your joints are stiff or sore  · Maintain a healthy weight  Extra weight puts increased pressure on your joints  Ask your healthcare provider what you should weigh  If you need to lose weight, he can help you create a weight loss program  Weight loss can help reduce pain and increase your ability to do your activities  The amount of exercise you do may vary each day, depending on your symptoms  · Wear flat or low-heeled shoes  This will help decrease pain and reduce pressure on your ankle, knee, and hip joints  · Use support devices as directed  You may be given splints to wear on your hands to help your joints rest and to decrease inflammation  While you sleep, use a pillow that is firm enough to support your neck and head  Other equipment  that may help you move and prevent falls:  · Orthotic shoes or insoles  help support your feet when you walk  · Crutches, a cane, or a walker  may help decrease your risk for falling  They also decrease stress on affected joints  · Devices to prevent falls  include raised toilet seats and bathtub bars to help you get up from sitting  Handrails can be placed in areas where you need balance and support  Follow up with your healthcare provider or rheumatologist as directed:  Write down your questions so you remember to ask them during your visits  © 2017 [employer] Information is for End User's use only and may not be sold, redistributed or otherwise used for commercial purposes  All illustrations and images included in CareNotes® are the copyrighted property of [employer]  or [employer]  The above information is an  only   It is not intended as medical advice for individual conditions or treatments  Talk to your doctor, nurse or pharmacist before following any medical regimen to see if it is safe and effective for you
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
## 2019-09-28 NOTE — LETTER
September 28, 2019     Arlene Bustos MD  [facility] [address]    Patient: Adonis Maurice   YOB: 1955   Date of Visit: 9/28/2019       Dear Dr Kane Vasques: Thank you for referring Janell Basilio to me for evaluation  Below are the relevant portions of my assessment and plan of care  If you have questions, please do not hesitate to call me  I look forward to following Malini Galvan along with you           Sincerely,        Marvin Crooks MD        CC: No Recipients

## 2019-09-30 ENCOUNTER — TELEPHONE (OUTPATIENT)
Dept: RADIOLOGY | Facility: MEDICAL CENTER | Age: 64
End: 2019-09-30

## 2019-09-30 DIAGNOSIS — M54.42 ACUTE LEFT-SIDED LOW BACK PAIN WITH LEFT-SIDED SCIATICA: ICD-10-CM

## 2019-09-30 RX ORDER — OXYCODONE HYDROCHLORIDE AND ACETAMINOPHEN 5; 325 MG/1; MG/1
1 TABLET ORAL EVERY 4 HOURS PRN
Qty: 20 TABLET | Refills: 0 | Status: SHIPPED | OUTPATIENT
Start: 2019-09-30 | End: 2019-09-30 | Stop reason: SDUPTHER

## 2019-09-30 RX ORDER — OXYCODONE HYDROCHLORIDE AND ACETAMINOPHEN 5; 325 MG/1; MG/1
1 TABLET ORAL EVERY 4 HOURS PRN
Qty: 20 TABLET | Refills: 0 | Status: SHIPPED | OUTPATIENT
Start: 2019-09-30 | End: 2020-01-25

## 2019-09-30 NOTE — TELEPHONE ENCOUNTER
Saw pain medicine Saturday  Being set up for an injection soon  Asking for refill of oxycodone to hold her over until then, she did ask them for a  prescription but they told her they do not Rx narcotics and to call her pcp  [facility]  She was taking up to 3 a day but decreased it down to 1-2 a day  Was given meloxicam at the appt  Says it makes her nauseous I have the rx set as a call in  Not sure if it can be?

## 2019-09-30 NOTE — TELEPHONE ENCOUNTER
I will refill the Rx however this is the last prescription  Should not be taking this routinely  for arthritis  Should call and get appt for injection

## 2019-09-30 NOTE — TELEPHONE ENCOUNTER
Patient lives in [address] [place] [address] and has seen Dr Maico Oviedo in the [facility] office  [facility] office location is closer for patient  Josie Mclean will call patient to coordinate

## 2019-09-30 NOTE — TELEPHONE ENCOUNTER
Pt aware - rx was re done and sent via electronically    She is calling to set up the appt for the injection in the morning

## 2019-09-30 NOTE — TELEPHONE ENCOUNTER
Per email:     Марина Lowe 9/30/2019 1:56 PM     Dr Stephenie Dang advised me over the weekend that and Ortho doc wanted to do a direct referral to him   for a hip injection  The Ortho doc put the order in , but Dr Stephenie Dang wanted to give me a heads up to ensure   the patient is scheduled for him  The patient is Akash Juarez ([birth year])  Can you please ensure the pt is scheduled for a hip injection with Dr Stephenie Dang? Thank you,  [address] Director  Musculoskeletal Services  [address] [place] [address] Physician Group    ++    Per referral in chart: patient referred by Dr Milton Mendoza for left femoral acetabular hip inj, [facility]  Patient has Medicare as primary payor: I will call and coordinate

## 2019-10-04 DIAGNOSIS — M16.12 PRIMARY LOCALIZED OSTEOARTHRITIS OF LEFT HIP: Primary | ICD-10-CM

## 2019-10-08 NOTE — TELEPHONE ENCOUNTER
Patient is calling back stating that she has been waiting over a week now for Josie Mclean to call her  She would like to schedule a procedure  Please have Josie Mclean or another  reach out to her  Patient is starting to get annoyed she doesn't understand why she has been called back yet?

## 2019-11-05 ENCOUNTER — CONSULT (OUTPATIENT)
Dept: PAIN MEDICINE | Facility: CLINIC | Age: 64
End: 2019-11-05
Payer: MEDICARE

## 2019-11-05 VITALS
HEIGHT: 64 IN | BODY MASS INDEX: 23.39 KG/M2 | HEART RATE: 96 BPM | SYSTOLIC BLOOD PRESSURE: 144 MMHG | RESPIRATION RATE: 18 BRPM | DIASTOLIC BLOOD PRESSURE: 72 MMHG | WEIGHT: 137 LBS

## 2019-11-05 DIAGNOSIS — M16.12 UNILATERAL OSTEOARTHRITIS OF HIP, LEFT: Primary | ICD-10-CM

## 2019-11-05 PROCEDURE — 99204 OFFICE O/P NEW MOD 45 MIN: CPT | Performed by: PHYSICAL MEDICINE & REHABILITATION
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
## 2019-11-05 NOTE — PATIENT INSTRUCTIONS
Arthritis   WHAT YOU NEED TO KNOW:   Arthritis is a disease that causes inflammation in one or more joints  There are many types of arthritis, such as osteoarthritis, rheumatoid arthritis, and septic arthritis  Some types cause inflammation in the joints  Other types wear away the cartilage between joints  This makes the bones of the joint rub together when you move the joint  Your symptoms may be constant, or symptoms may come and go  Arthritis often gets worse over time and can cause permanent joint damage  DISCHARGE INSTRUCTIONS:   Return to the emergency department if:   · You have a fever and severe joint pain or swelling  · You cannot move the affected joint  · You have severe joint pain you cannot tolerate  Contact your healthcare provider if:   · Your pain or swelling does not get better with treatment  · You have questions or concerns about your condition or care  Medicines:   · Acetaminophen  decreases pain and fever  It is available without a doctor's order  Ask how much to take and how often to take it  Follow directions  Acetaminophen can cause liver damage if not taken correctly  · NSAIDs , such as ibuprofen, help decrease swelling, pain, and fever  This medicine is available with or without a doctor's order  NSAIDs can cause stomach bleeding or kidney problems in certain people  If you take blood thinner medicine, always ask your healthcare provider if NSAIDs are safe for you  Always read the medicine label and follow directions  · Steroids  reduce swelling and pain  · Prescription pain medicine  may be given  Do not wait until the pain is severe before you take your medicine  Ask your healthcare provider how to take this medicine safely  · Take your medicine as directed  Contact your healthcare provider if you think your medicine is not helping or if you have side effects  Tell him of her if you are allergic to any medicine   Keep a list of the medicines, vitamins, and herbs you take  Include the amounts, and when and why you take them  Bring the list or the pill bottles to follow-up visits  Carry your medicine list with you in case of an emergency  Follow up with your healthcare provider or rheumatologist as directed:  Write down your questions so you remember to ask them during your visits  Manage arthritis:   · Rest your painful joint so it can heal   Your healthcare provider may recommend crutches or a walker if the affected joint is in a leg  · Apply ice or heat to the joint  Both can help decrease swelling and pain  Ice may also help prevent tissue damage  Use an ice pack, or put crushed ice in a plastic bag  Cover it with a towel and place it on your joint for 15 to 20 minutes every hour or as directed  You can apply heat for 20 minutes every 2 hours  Heat treatment includes hot packs or heat lamps  · Elevate your joint  Elevation helps reduce swelling and pain  Raise your joint above the level of your heart as often as you can  Prop your painful joint on pillows to keep it above your heart comfortably  · Go to therapy as directed  A physical therapist can teach you exercises to improve flexibility and range of motion  You may also be shown non-weight-bearing exercises that are safe for your joints, such as swimming  Exercise can help keep your joints flexible and reduce pain  An occupational therapist can help you learn to do your daily activities when your joints are stiff or sore  · Maintain a healthy weight  Extra weight puts increased pressure on your joints  Ask your healthcare provider what you should weigh  If you need to lose weight, he can help you create a weight loss program  Weight loss can help reduce pain and increase your ability to do your activities  The amount of exercise you do may vary each day, depending on your symptoms  · Wear flat or low-heeled shoes    This will help decrease pain and reduce pressure on your ankle, knee, and hip joints  · Use support devices  You may be given splints to wear on your hands to help your joints rest and to decrease inflammation  While you sleep, use a pillow that is firm enough to support your neck and head  Support equipment:  The following may help you move and prevent falls:  · Orthotic shoes or insoles  help support your feet when you walk  · Crutches, a cane, or a walker  may help decrease your risk for falling  They also decrease stress on affected joints  · Devices to prevent falls  include raised toilet seats and bathtub bars to help you get up from sitting  Handrails can be placed in areas where you need balance and support  © 2017 [address] [facility] Information is for End User's use only and may not be sold, redistributed or otherwise used for commercial purposes  All illustrations and images included in CareNotes® are the copyrighted property of A D A M [employer] Inc  or Jason Davis  The above information is an  only  It is not intended as medical advice for individual conditions or treatments  Talk to your doctor, nurse or pharmacist before following any medical regimen to see if it is safe and effective for you

## 2019-11-13 ENCOUNTER — HOSPITAL ENCOUNTER (OUTPATIENT)
Dept: RADIOLOGY | Facility: CLINIC | Age: 64
Discharge: HOME | End: 2019-11-13
Attending: PHYSICAL MEDICINE & REHABILITATION | Admitting: PHYSICAL MEDICINE & REHABILITATION
Payer: MEDICARE

## 2019-11-13 VITALS
RESPIRATION RATE: 22 BRPM | SYSTOLIC BLOOD PRESSURE: 129 MMHG | HEART RATE: 101 BPM | TEMPERATURE: 98.3 F | OXYGEN SATURATION: 99 % | DIASTOLIC BLOOD PRESSURE: 84 MMHG

## 2019-11-13 DIAGNOSIS — M16.12 UNILATERAL OSTEOARTHRITIS OF HIP, LEFT: ICD-10-CM

## 2019-11-13 PROCEDURE — 20610 DRAIN/INJ JOINT/BURSA W/O US: CPT | Performed by: PHYSICAL MEDICINE & REHABILITATION

## 2019-11-13 PROCEDURE — 77002 NEEDLE LOCALIZATION BY XRAY: CPT

## 2019-11-13 PROCEDURE — 77002 NEEDLE LOCALIZATION BY XRAY: CPT | Performed by: PHYSICAL MEDICINE & REHABILITATION

## 2019-11-13 RX ORDER — BUPIVACAINE HCL/PF 2.5 MG/ML
10 VIAL (ML) INJECTION ONCE
Status: COMPLETED | OUTPATIENT
Start: 2019-11-13 | End: 2019-11-13

## 2019-11-13 RX ORDER — METHYLPREDNISOLONE ACETATE 80 MG/ML
80 INJECTION, SUSPENSION INTRA-ARTICULAR; INTRALESIONAL; INTRAMUSCULAR; PARENTERAL; SOFT TISSUE ONCE
Status: COMPLETED | OUTPATIENT
Start: 2019-11-13 | End: 2019-11-13

## 2019-11-13 RX ORDER — 0.9 % SODIUM CHLORIDE 0.9 %
10 VIAL (ML) INJECTION ONCE
Status: COMPLETED | OUTPATIENT
Start: 2019-11-13 | End: 2019-11-13

## 2019-11-13 RX ADMIN — IOHEXOL 1 ML: 300 INJECTION, SOLUTION INTRAVENOUS at 09:25

## 2019-11-13 RX ADMIN — METHYLPREDNISOLONE ACETATE 80 MG: 80 INJECTION, SUSPENSION INTRA-ARTICULAR; INTRALESIONAL; INTRAMUSCULAR; PARENTERAL; SOFT TISSUE at 09:26

## 2019-11-13 RX ADMIN — Medication 1 ML: at 09:23

## 2019-11-13 RX ADMIN — SODIUM CHLORIDE 1 ML: 9 INJECTION, SOLUTION INTRAMUSCULAR; INTRAVENOUS; SUBCUTANEOUS at 09:23

## 2019-11-13 RX ADMIN — BUPIVACAINE HYDROCHLORIDE 3 ML: 2.5 INJECTION, SOLUTION EPIDURAL; INFILTRATION; INTRACAUDAL at 09:26

## 2019-11-13 NOTE — DISCHARGE INSTR - LAB
1  Do not apply heat to any area that is numb  If you have discomfort or soreness at the injection site, you may apply ice today, 20 minutes on and 20 minutes off  Tomorrow you may use ice or warm, moist heat  Do not apply ice or heat directly to the skin  2  If you experience severe shortness of breath, go to the Emergency Room  3  You may have numbness for several hours from the local anesthetic  Please use caution and common sense, especially with weight-bearing activities  4  You may have an increase or change in the discomfort for 36-48 hours after your treatment  Apply ice and continue with any pain medicine you have been prescribed  5  Do not do anything strenuous today  You may shower, but no tub baths or hot tubs today  You may resume your normal activities tomorrow, but do not overdo it  Resume normal activities slowly when you are feeling better  6  If you experience redness, drainage or swelling at the injection site, or if you develop a fever above 100 degrees, please call [facility] at [phone] or go to the Emergency Room  7  Continue to take all routine medicines prescribed by your primary care physician unless otherwise instructed by our staff  Most blood thinners should be started again according to your regularly scheduled dosing  If you have any questions, please give our office a call  If you have a problem specifically related to your procedure, please call our office at [phone]  Problems not related to your procedure should be directed to your primary care physician
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
## 2019-11-15 ENCOUNTER — APPOINTMENT (OUTPATIENT)
Dept: LAB | Facility: MEDICAL CENTER | Age: 64
End: 2019-11-15
Payer: MEDICARE

## 2019-11-15 DIAGNOSIS — M81.8 OTHER OSTEOPOROSIS WITHOUT CURRENT PATHOLOGICAL FRACTURE: ICD-10-CM

## 2019-11-15 DIAGNOSIS — K21.9 GASTROESOPHAGEAL REFLUX DISEASE, ESOPHAGITIS PRESENCE NOT SPECIFIED: ICD-10-CM

## 2019-11-15 DIAGNOSIS — M54.42 ACUTE LEFT-SIDED LOW BACK PAIN WITH LEFT-SIDED SCIATICA: ICD-10-CM

## 2019-11-15 DIAGNOSIS — Z01.419 ENCOUNTER FOR GYNECOLOGICAL EXAMINATION WITHOUT ABNORMAL FINDING: ICD-10-CM

## 2019-11-15 DIAGNOSIS — E78.01 FAMILIAL HYPERCHOLESTEROLEMIA: ICD-10-CM

## 2019-11-15 LAB
25(OH)D3 SERPL-MCNC: 57.2 NG/ML (ref 30–100)
ALBUMIN SERPL BCP-MCNC: 4 G/DL (ref 3.5–5)
ALP SERPL-CCNC: 48 U/L (ref 46–116)
ALT SERPL W P-5'-P-CCNC: 17 U/L (ref 12–78)
ANION GAP SERPL CALCULATED.3IONS-SCNC: 5 MMOL/L (ref 4–13)
AST SERPL W P-5'-P-CCNC: 10 U/L (ref 5–45)
BASOPHILS # BLD AUTO: 0.04 THOUSANDS/ΜL (ref 0–0.1)
BASOPHILS NFR BLD AUTO: 0 % (ref 0–1)
BILIRUB SERPL-MCNC: 0.51 MG/DL (ref 0.2–1)
BUN SERPL-MCNC: 20 MG/DL (ref 5–25)
CALCIUM SERPL-MCNC: 9.8 MG/DL (ref 8.3–10.1)
CHLORIDE SERPL-SCNC: 105 MMOL/L (ref 100–108)
CHOLEST SERPL-MCNC: 223 MG/DL (ref 50–200)
CO2 SERPL-SCNC: 28 MMOL/L (ref 21–32)
CREAT SERPL-MCNC: 0.94 MG/DL (ref 0.6–1.3)
EOSINOPHIL # BLD AUTO: 0.03 THOUSAND/ΜL (ref 0–0.61)
EOSINOPHIL NFR BLD AUTO: 0 % (ref 0–6)
ERYTHROCYTE [DISTWIDTH] IN BLOOD BY AUTOMATED COUNT: 12.3 % (ref 11.6–15.1)
GFR SERPL CREATININE-BSD FRML MDRD: 64 ML/MIN/1.73SQ M
GLUCOSE P FAST SERPL-MCNC: 81 MG/DL (ref 65–99)
HCT VFR BLD AUTO: 41.2 % (ref 34.8–46.1)
HDLC SERPL-MCNC: 64 MG/DL
HGB BLD-MCNC: 12.8 G/DL (ref 11.5–15.4)
IMM GRANULOCYTES # BLD AUTO: 0.04 THOUSAND/UL (ref 0–0.2)
IMM GRANULOCYTES NFR BLD AUTO: 0 % (ref 0–2)
LDLC SERPL CALC-MCNC: 133 MG/DL (ref 0–100)
LYMPHOCYTES # BLD AUTO: 2.65 THOUSANDS/ΜL (ref 0.6–4.47)
LYMPHOCYTES NFR BLD AUTO: 24 % (ref 14–44)
MCH RBC QN AUTO: 29.5 PG (ref 26.8–34.3)
MCHC RBC AUTO-ENTMCNC: 31.1 G/DL (ref 31.4–37.4)
MCV RBC AUTO: 95 FL (ref 82–98)
MONOCYTES # BLD AUTO: 0.95 THOUSAND/ΜL (ref 0.17–1.22)
MONOCYTES NFR BLD AUTO: 9 % (ref 4–12)
NEUTROPHILS # BLD AUTO: 7.17 THOUSANDS/ΜL (ref 1.85–7.62)
NEUTS SEG NFR BLD AUTO: 67 % (ref 43–75)
NONHDLC SERPL-MCNC: 159 MG/DL
NRBC BLD AUTO-RTO: 0 /100 WBCS
PLATELET # BLD AUTO: 480 THOUSANDS/UL (ref 149–390)
PMV BLD AUTO: 10.2 FL (ref 8.9–12.7)
POTASSIUM SERPL-SCNC: 4.6 MMOL/L (ref 3.5–5.3)
PROT SERPL-MCNC: 7.5 G/DL (ref 6.4–8.2)
RBC # BLD AUTO: 4.34 MILLION/UL (ref 3.81–5.12)
SODIUM SERPL-SCNC: 138 MMOL/L (ref 136–145)
TRIGL SERPL-MCNC: 130 MG/DL
TSH SERPL DL<=0.05 MIU/L-ACNC: 1.4 UIU/ML (ref 0.36–3.74)
WBC # BLD AUTO: 10.88 THOUSAND/UL (ref 4.31–10.16)

## 2019-11-15 PROCEDURE — 80061 LIPID PANEL: CPT

## 2019-11-15 PROCEDURE — 80053 COMPREHEN METABOLIC PANEL: CPT | Performed by: PHYSICIAN ASSISTANT

## 2019-11-15 PROCEDURE — 82306 VITAMIN D 25 HYDROXY: CPT | Performed by: PHYSICIAN ASSISTANT

## 2019-11-15 PROCEDURE — 84443 ASSAY THYROID STIM HORMONE: CPT | Performed by: PHYSICIAN ASSISTANT

## 2019-11-15 PROCEDURE — 36415 COLL VENOUS BLD VENIPUNCTURE: CPT | Performed by: PHYSICIAN ASSISTANT

## 2019-11-15 PROCEDURE — 85025 COMPLETE CBC W/AUTO DIFF WBC: CPT

## 2019-11-18 ENCOUNTER — TELEPHONE (OUTPATIENT)
Dept: FAMILY MEDICINE CLINIC | Facility: MEDICAL CENTER | Age: 64
End: 2019-11-18

## 2019-11-18 NOTE — TELEPHONE ENCOUNTER
----- Message from Yu Vang MD sent at 11/17/2019  8:10 PM EST -----  Please check chart and see if you can find Hematology consult for her thrombocytosis    Can't find it

## 2019-11-20 ENCOUNTER — TELEPHONE (OUTPATIENT)
Dept: PAIN MEDICINE | Facility: CLINIC | Age: 64
End: 2019-11-20

## 2019-11-20 NOTE — TELEPHONE ENCOUNTER
Pt reports 80% improvement post inj   Pain level 2/10  Pain level prior to inj was 4-5/10  She said she is able to walk without limping

## 2019-12-04 ENCOUNTER — TELEPHONE (OUTPATIENT)
Dept: FAMILY MEDICINE CLINIC | Facility: MEDICAL CENTER | Age: 64
End: 2019-12-04

## 2019-12-05 NOTE — TELEPHONE ENCOUNTER
Pt aware  She states that Dr Arelis Maldonado advised her that the platelet count needed no further workup and that she was not overly concerned with the number because of the genetic history  She said no medication was ever discussed

## 2019-12-05 NOTE — TELEPHONE ENCOUNTER
Sorry for the delay  Needed to locate the Hematology consult she had from Dr Gordon regarding her elevated platelets  Her blood work is good except for elevated platelets and elevated cholesterol  The cholesterol is 233 which is about what it was in the past   She needs to watch her diet and would repeat in 1 year  What was Dr Choe Fuel final recommendation regarding her platelets? Current blood work shows they are 480,000  The note said to consider medication to reduce them  to less than 400,000    Her white blood cell count is borderline elevated but that could have been due to the  steroids she had

## 2019-12-10 ENCOUNTER — TELEPHONE (OUTPATIENT)
Dept: FAMILY MEDICINE CLINIC | Facility: MEDICAL CENTER | Age: 64
End: 2019-12-10

## 2019-12-10 NOTE — TELEPHONE ENCOUNTER
Pt's grandson has pink eye and she is showing signs of developing it as well  Red itchy eye  She is hoping to have an RX called in  Offered appt with MP today, UC and pcp tomorrow but she cant make any of them

## 2019-12-18 NOTE — TELEPHONE ENCOUNTER
Verify that theJAK testing was done  Otherwise will monitor her platelet count as advised by Hematology  Steroids might have increased the recent 1  Would repeat a CBC in a few weeks  Also mention Im lorena I did not see other message regarding eye  symptoms   Assuming resolved

## 2019-12-18 NOTE — TELEPHONE ENCOUNTER
I spoke with Alexandra Major, she said that she did have the JAK2 genetic testing done and her hematologist told her that she "had nothing to worry about"  Agreeable to repeating the CBC, order placed  Also, regarding her eye, she said it is fine and that it was just allergy related and resolved on its own

## 2019-12-30 ENCOUNTER — APPOINTMENT (OUTPATIENT)
Dept: LAB | Facility: MEDICAL CENTER | Age: 64
End: 2019-12-30
Payer: MEDICARE

## 2019-12-30 ENCOUNTER — TELEPHONE (OUTPATIENT)
Dept: FAMILY MEDICINE CLINIC | Facility: MEDICAL CENTER | Age: 64
End: 2019-12-30

## 2019-12-30 DIAGNOSIS — R79.89 ABNORMAL CBC: Primary | ICD-10-CM

## 2019-12-30 DIAGNOSIS — R79.89 ABNORMAL CBC: ICD-10-CM

## 2019-12-30 LAB
BASOPHILS # BLD AUTO: 0.11 THOUSANDS/ΜL (ref 0–0.1)
BASOPHILS NFR BLD AUTO: 1 % (ref 0–1)
EOSINOPHIL # BLD AUTO: 0.39 THOUSAND/ΜL (ref 0–0.61)
EOSINOPHIL NFR BLD AUTO: 5 % (ref 0–6)
ERYTHROCYTE [DISTWIDTH] IN BLOOD BY AUTOMATED COUNT: 12.4 % (ref 11.6–15.1)
HCT VFR BLD AUTO: 39.6 % (ref 34.8–46.1)
HGB BLD-MCNC: 12.8 G/DL (ref 11.5–15.4)
IMM GRANULOCYTES # BLD AUTO: 0.03 THOUSAND/UL (ref 0–0.2)
IMM GRANULOCYTES NFR BLD AUTO: 0 % (ref 0–2)
LYMPHOCYTES # BLD AUTO: 2.43 THOUSANDS/ΜL (ref 0.6–4.47)
LYMPHOCYTES NFR BLD AUTO: 29 % (ref 14–44)
MCH RBC QN AUTO: 29.8 PG (ref 26.8–34.3)
MCHC RBC AUTO-ENTMCNC: 32.3 G/DL (ref 31.4–37.4)
MCV RBC AUTO: 92 FL (ref 82–98)
MONOCYTES # BLD AUTO: 0.85 THOUSAND/ΜL (ref 0.17–1.22)
MONOCYTES NFR BLD AUTO: 10 % (ref 4–12)
NEUTROPHILS # BLD AUTO: 4.61 THOUSANDS/ΜL (ref 1.85–7.62)
NEUTS SEG NFR BLD AUTO: 55 % (ref 43–75)
NRBC BLD AUTO-RTO: 0 /100 WBCS
PLATELET # BLD AUTO: 441 THOUSANDS/UL (ref 149–390)
PMV BLD AUTO: 10.4 FL (ref 8.9–12.7)
RBC # BLD AUTO: 4.3 MILLION/UL (ref 3.81–5.12)
WBC # BLD AUTO: 8.42 THOUSAND/UL (ref 4.31–10.16)

## 2019-12-30 PROCEDURE — 85025 COMPLETE CBC W/AUTO DIFF WBC: CPT

## 2019-12-30 PROCEDURE — 36415 COLL VENOUS BLD VENIPUNCTURE: CPT

## 2019-12-31 ENCOUNTER — OFFICE VISIT (OUTPATIENT)
Dept: PAIN MEDICINE | Facility: CLINIC | Age: 64
End: 2019-12-31
Payer: MEDICARE

## 2019-12-31 VITALS
BODY MASS INDEX: 23.22 KG/M2 | HEART RATE: 88 BPM | RESPIRATION RATE: 18 BRPM | DIASTOLIC BLOOD PRESSURE: 74 MMHG | WEIGHT: 136 LBS | HEIGHT: 64 IN | SYSTOLIC BLOOD PRESSURE: 118 MMHG

## 2019-12-31 DIAGNOSIS — M16.12 UNILATERAL OSTEOARTHRITIS OF HIP, LEFT: ICD-10-CM

## 2019-12-31 DIAGNOSIS — M25.552 PAIN IN LEFT HIP: Primary | ICD-10-CM

## 2019-12-31 PROCEDURE — 99213 OFFICE O/P EST LOW 20 MIN: CPT | Performed by: PHYSICAL MEDICINE & REHABILITATION

## 2019-12-31 RX ORDER — MELOXICAM 7.5 MG/1
7.5 TABLET ORAL DAILY
Qty: 30 TABLET | Refills: 0 | Status: SHIPPED | OUTPATIENT
Start: 2019-12-31 | End: 2020-01-29 | Stop reason: DRUGHIGH
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
## 2020-01-09 ENCOUNTER — TELEPHONE (OUTPATIENT)
Dept: FAMILY MEDICINE CLINIC | Facility: MEDICAL CENTER | Age: 65
End: 2020-01-09

## 2020-01-25 ENCOUNTER — OFFICE VISIT (OUTPATIENT)
Dept: OBGYN CLINIC | Facility: MEDICAL CENTER | Age: 65
End: 2020-01-25
Payer: MEDICARE

## 2020-01-25 VITALS
BODY MASS INDEX: 23.29 KG/M2 | WEIGHT: 136.4 LBS | DIASTOLIC BLOOD PRESSURE: 93 MMHG | HEART RATE: 116 BPM | HEIGHT: 64 IN | SYSTOLIC BLOOD PRESSURE: 155 MMHG

## 2020-01-25 DIAGNOSIS — M25.552 PAIN IN LEFT HIP: Primary | ICD-10-CM

## 2020-01-25 DIAGNOSIS — M16.12 PRIMARY OSTEOARTHRITIS OF LEFT HIP: ICD-10-CM

## 2020-01-25 PROCEDURE — 99213 OFFICE O/P EST LOW 20 MIN: CPT | Performed by: EMERGENCY MEDICINE
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
## 2020-01-29 ENCOUNTER — TELEPHONE (OUTPATIENT)
Dept: RADIOLOGY | Facility: CLINIC | Age: 65
End: 2020-01-29

## 2020-01-29 ENCOUNTER — HOSPITAL ENCOUNTER (OUTPATIENT)
Dept: RADIOLOGY | Facility: CLINIC | Age: 65
Discharge: HOME | End: 2020-01-29
Attending: PHYSICAL MEDICINE & REHABILITATION
Payer: MEDICARE

## 2020-01-29 VITALS
TEMPERATURE: 98.2 F | HEART RATE: 101 BPM | DIASTOLIC BLOOD PRESSURE: 79 MMHG | RESPIRATION RATE: 20 BRPM | OXYGEN SATURATION: 98 % | SYSTOLIC BLOOD PRESSURE: 140 MMHG

## 2020-01-29 DIAGNOSIS — M16.12 UNILATERAL OSTEOARTHRITIS OF HIP, LEFT: Primary | ICD-10-CM

## 2020-01-29 DIAGNOSIS — M46.1 SACROILIITIS (HCC): ICD-10-CM

## 2020-01-29 DIAGNOSIS — M25.552 PAIN IN LEFT HIP: ICD-10-CM

## 2020-01-29 PROCEDURE — 20610 DRAIN/INJ JOINT/BURSA W/O US: CPT | Performed by: PHYSICAL MEDICINE & REHABILITATION

## 2020-01-29 PROCEDURE — 77002 NEEDLE LOCALIZATION BY XRAY: CPT

## 2020-01-29 PROCEDURE — 77002 NEEDLE LOCALIZATION BY XRAY: CPT | Performed by: PHYSICAL MEDICINE & REHABILITATION

## 2020-01-29 RX ORDER — MELOXICAM 15 MG/1
15 TABLET ORAL DAILY
Qty: 30 TABLET | Refills: 1 | Status: SHIPPED | OUTPATIENT
Start: 2020-01-29 | End: 2020-03-20

## 2020-01-29 RX ORDER — METHYLPREDNISOLONE ACETATE 80 MG/ML
80 INJECTION, SUSPENSION INTRA-ARTICULAR; INTRALESIONAL; INTRAMUSCULAR; PARENTERAL; SOFT TISSUE ONCE
Status: COMPLETED | OUTPATIENT
Start: 2020-01-29 | End: 2020-01-29

## 2020-01-29 RX ORDER — BUPIVACAINE HCL/PF 2.5 MG/ML
10 VIAL (ML) INJECTION ONCE
Status: COMPLETED | OUTPATIENT
Start: 2020-01-29 | End: 2020-01-29

## 2020-01-29 RX ORDER — 0.9 % SODIUM CHLORIDE 0.9 %
10 VIAL (ML) INJECTION ONCE
Status: COMPLETED | OUTPATIENT
Start: 2020-01-29 | End: 2020-01-29

## 2020-01-29 RX ADMIN — Medication 1 ML: at 08:15

## 2020-01-29 RX ADMIN — METHYLPREDNISOLONE ACETATE 80 MG: 80 INJECTION, SUSPENSION INTRA-ARTICULAR; INTRALESIONAL; INTRAMUSCULAR; PARENTERAL; SOFT TISSUE at 08:18

## 2020-01-29 RX ADMIN — IOHEXOL 0.5 ML: 300 INJECTION, SOLUTION INTRAVENOUS at 08:17

## 2020-01-29 RX ADMIN — BUPIVACAINE HYDROCHLORIDE 3 ML: 2.5 INJECTION, SOLUTION EPIDURAL; INFILTRATION; INTRACAUDAL at 08:18

## 2020-01-29 RX ADMIN — SODIUM CHLORIDE 1 ML: 9 INJECTION, SOLUTION INTRAMUSCULAR; INTRAVENOUS; SUBCUTANEOUS at 08:15

## 2020-01-29 NOTE — TELEPHONE ENCOUNTER
Pt seen for procedure today, requesting a refill and dosage increase for meloxicam  Please advise, ty

## 2020-01-29 NOTE — DISCHARGE INSTR - LAB
1  Do not apply heat to any area that is numb  If you have discomfort or soreness at the injection site, you may apply ice today, 20 minutes on and 20 minutes off  Tomorrow you may use ice or warm, moist heat  Do not apply ice or heat directly to the skin  2  If you experience severe shortness of breath, go to the Emergency Room  3  You may have numbness for several hours from the local anesthetic  Please use caution and common sense, especially with weight-bearing activities  4  You may have an increase or change in the discomfort for 36-48 hours after your treatment  Apply ice and continue with any pain medicine you have been prescribed  5  Do not do anything strenuous today  You may shower, but no tub baths or hot tubs today  You may resume your normal activities tomorrow, but do not overdo it  Resume normal activities slowly when you are feeling better  6  If you experience redness, drainage or swelling at the injection site, or if you develop a fever above 100 degrees, please call [facility] at [phone] or go to the Emergency Room  7  Continue to take all routine medicines prescribed by your primary care physician unless otherwise instructed by our staff  Most blood thinners should be started again according to your regularly scheduled dosing  If you have any questions, please give our office a call  If you have a problem specifically related to your procedure, please call our office at [phone]  Problems not related to your procedure should be directed to your primary care physician
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
## 2020-02-04 ENCOUNTER — TELEPHONE (OUTPATIENT)
Dept: RHEUMATOLOGY | Facility: CLINIC | Age: 65
End: 2020-02-04

## 2020-02-04 NOTE — TELEPHONE ENCOUNTER
Called patient and left a voicemail  She received last prolia 8/16/2019  She is scheduled 2/7/2020  I need to push appt back since she is just a little early       I asked that she call me back so I can discuss with her and also verify insurance

## 2020-02-05 ENCOUNTER — TELEPHONE (OUTPATIENT)
Dept: PAIN MEDICINE | Facility: CLINIC | Age: 65
End: 2020-02-05

## 2020-02-05 NOTE — TELEPHONE ENCOUNTER
Attempt to reach pt to schedule, LM for return call  If patient calls please schedule for 2 week SOVS with Dr Zach Rosenthal office

## 2020-02-05 NOTE — TELEPHONE ENCOUNTER
Pt reports 30% improvement post inj   Pain level 7/10   Pain level at procedure was 8/10  She said she has a lot of pain today

## 2020-02-07 ENCOUNTER — HOSPITAL ENCOUNTER (OUTPATIENT)
Dept: RADIOLOGY | Facility: MEDICAL CENTER | Age: 65
Discharge: HOME | End: 2020-02-07
Payer: MEDICARE

## 2020-02-07 VITALS — HEIGHT: 64 IN | WEIGHT: 136 LBS | BODY MASS INDEX: 23.22 KG/M2

## 2020-02-07 DIAGNOSIS — Z12.39 SCREENING FOR BREAST CANCER: ICD-10-CM

## 2020-02-07 PROCEDURE — 77063 BREAST TOMOSYNTHESIS BI: CPT

## 2020-02-07 PROCEDURE — 77067 SCR MAMMO BI INCL CAD: CPT

## 2020-02-10 DIAGNOSIS — K21.9 GASTROESOPHAGEAL REFLUX DISEASE, ESOPHAGITIS PRESENCE NOT SPECIFIED: ICD-10-CM

## 2020-02-10 RX ORDER — OMEPRAZOLE 20 MG/1
CAPSULE, DELAYED RELEASE ORAL
Qty: 90 CAPSULE | Refills: 1 | Status: SHIPPED | OUTPATIENT
Start: 2020-02-10 | End: 2020-07-24

## 2020-02-21 ENCOUNTER — OFFICE VISIT (OUTPATIENT)
Dept: RHEUMATOLOGY | Facility: CLINIC | Age: 65
End: 2020-02-21
Payer: MEDICARE

## 2020-02-21 VITALS — HEIGHT: 64 IN | HEART RATE: 100 BPM | WEIGHT: 136 LBS | BODY MASS INDEX: 23.22 KG/M2

## 2020-02-21 DIAGNOSIS — M81.8 OTHER OSTEOPOROSIS WITHOUT CURRENT PATHOLOGICAL FRACTURE: Primary | ICD-10-CM

## 2020-02-21 DIAGNOSIS — M16.12 PRIMARY OSTEOARTHRITIS OF LEFT HIP: ICD-10-CM

## 2020-02-21 DIAGNOSIS — E55.9 VITAMIN D DEFICIENCY: ICD-10-CM

## 2020-02-21 PROCEDURE — 4040F PNEUMOC VAC/ADMIN/RCVD: CPT

## 2020-02-21 PROCEDURE — 96372 THER/PROPH/DIAG INJ SC/IM: CPT

## 2020-02-21 PROCEDURE — 99214 OFFICE O/P EST MOD 30 MIN: CPT

## 2020-02-21 PROCEDURE — 3008F BODY MASS INDEX DOCD: CPT

## 2020-02-21 PROCEDURE — 1036F TOBACCO NON-USER: CPT

## 2020-02-21 NOTE — PROGRESS NOTES
Assessment and Plan:   Patient is a 77-year-old female who presents for rheumatology follow-up for osteoporosis  She also has generalized osteoarthritis and is preparing for a left hip replacement in the future  She otherwise has no complaints today and is doing well  She received her Prolia injection without complication and will be due again in 6 months  Prior to that shot I asked her to get some updated blood work which she will do  She will not be due for a DEXA until August 2021 and we will prepare next year for that  She will continue with her current calcium and vitamin-D doses since her vitamin-D was within a good range  Plan:  Diagnoses and all orders for this visit:    Other osteoporosis without current pathological fracture  -     denosumab (PROLIA) subcutaneous injection 60 mg  -     Vitamin D 25 hydroxy  -     Basic metabolic panel    Vitamin D deficiency  -     Vitamin D 25 hydroxy  -     Basic metabolic panel    Primary osteoarthritis of left hip        Follow-up plan: 6 months for prolia only, then next 6 months with me       Rheumatic Disease Summary  1  Osteoporosis   -Established with Dr Abdoul Lopez- June, 2015- dx with osteoporosis with compression fracture at T12 in 2013     -Prior meds: Boniva x 6 years and Reclast x 1 dose with Dr Cathye Babinski; presented off treatment for a few years  -DEXA 7/2015: T -3 7 lumbar, T -2 5 hip  -DEXA 8/1/2017: T -3 3 lumbar, unable to compare to prior   -Started on Forteo 8/2015, last dose in 8/2017  -August, 2017- DEXA showed T -1 5 at femoral neck and -3 3 at lumbar spine  -Started on Prolia 8/2017, last dose given 2/20/20, next due around 8/20/20  -DEXA 8/2/19: T -3 lumbar, significant increased BMD in lumbar and hip compared to 2017 study  -DEXA due 8/2021  2  Comorbidities: s/p right TKA, GERD, depression, OA, lumbar DDD/OA     HPI  Viki Abraham is a 71 y o   female who presents for rheumatology follow-up for osteoporosis, currently on Prolia    She is due for her Prolia injection today  She reports no major changes in her health since she was last here  We reviewed that she did have a history of the compression fracture of T12 in 2013, but no other fracture history  She is still on calcium and vitamin-D and her vitamin-D was 57 with her last blood work  She does have ongoing left hip pain and is preparing to have a hip replacement  She already has a right knee replacement  The following portions of the patient's history were reviewed and updated as appropriate: allergies, current medications, past family history, past medical history, past social history, past surgical history and problem list     Review of Systems:   Review of Systems   Constitutional: Negative for fatigue and unexpected weight change  HENT: Negative for mouth sores  Respiratory: Negative for cough and shortness of breath  Gastrointestinal: Negative for constipation and diarrhea  Musculoskeletal: Positive for arthralgias (left hip)  Negative for back pain, joint swelling and myalgias  Skin: Negative for color change and rash  Neurological: Negative for weakness  Psychiatric/Behavioral: Negative for sleep disturbance  Home Medications:    Current Outpatient Medications:     Calcium 600 MG tablet, Take 1 tablet by mouth daily, Disp: , Rfl:     Cholecalciferol (VITAMIN D) 2000 units CAPS, Take 1 tablet by mouth daily, Disp: , Rfl:     meloxicam (MOBIC) 15 mg tablet, Take 1 tablet (15 mg total) by mouth daily, Disp: 30 tablet, Rfl: 1    omeprazole (PriLOSEC) 20 mg delayed release capsule, TAKE 1 CAPSULE BY MOUTH EVERY DAY, Disp: 90 capsule, Rfl: 1  No current facility-administered medications for this visit  Objective:    Vitals:    02/21/20 1518   Pulse: 100   Weight: 61 7 kg (136 lb)   Height: 5' 4" (1 626 m)       Physical Exam   Constitutional: She appears well-developed and well-nourished  She is cooperative  No distress     HENT:   Head: Normocephalic and atraumatic  Eyes: Conjunctivae, EOM and lids are normal  No scleral icterus  Neck: Neck supple  Pulmonary/Chest: Effort normal  No accessory muscle usage  No tachypnea  No respiratory distress  Neurological: She is alert  Skin: Skin is dry and intact  No rash noted  Psychiatric: She has a normal mood and affect  Her behavior is normal        Imaging:   DEXA 8/2/19:  RESULTS:   LUMBAR SPINE:  L1-L4:  BMD 0 908 gm/cm2  T-score -1 3  Z-score 0 5  LUMBAR SPINE L3 and L4 (average) :   BMD 0 769 gm/sq-cm  T-score is -3 0   Z-score is -1 1   LEFT TOTAL HIP:  BMD 0 823 gm/cm2  T-score -1 0  Z-score 0 3  LEFT FEMORAL NECK:  BMD 0 716 gm/cm2  T-score -1 2  Z-score 0 3  IMPRESSION:  1  Based on the [facility] classification, the T-score of -3 0 in the lumbar spine is consistent with OSTEOPOROSIS  Although not part of the [facility] classification, the presence of a fragility fracture (stress fracture) in conjunction with a bone density examination demonstrating osteoporosis, should be considered diagnostic of SEVERE OSTEOPOROSIS  2  When compared to the prior examination, there has been a  4 % INCREASE in the total bone mineral density of the lumbar spine and a  4 % INCREASE in the total bone mineral density of the left hip      Labs:   Component      Latest Ref Rng & Units 11/15/2019   WBC      4 31 - 10 16 Thousand/uL 10 88 (H)   Red Blood Cell Count      3 81 - 5 12 Million/uL 4 34   Hemoglobin      11 5 - 15 4 g/dL 12 8   HCT      34 8 - 46 1 % 41 2   MCV      82 - 98 fL 95   MCH      26 8 - 34 3 pg 29 5   MCHC      31 4 - 37 4 g/dL 31 1 (L)   RDW      11 6 - 15 1 % 12 3   MPV      8 9 - 12 7 fL 10 2   Platelet Count      877 - 390 Thousands/uL 480 (H)   nRBC      /100 WBCs 0   Neutrophils %      43 - 75 % 67   Immat GRANS %      0 - 2 % 0   Lymphocytes Relative      14 - 44 % 24   Monocytes Relative      4 - 12 % 9   Eosinophils      0 - 6 % 0   Basophils Relative      0 - 1 % 0   Absolute Neutrophils      1 85 - 7 62 Thousands/µL 7 17   Immature Grans Absolute      0 00 - 0 20 Thousand/uL 0 04   Lymphocytes Absolute      0 60 - 4 47 Thousands/µL 2 65   Absolute Monocytes      0 17 - 1 22 Thousand/µL 0 95   Absolute Eosinophils      0 00 - 0 61 Thousand/µL 0 03   Basophils Absolute      0 00 - 0 10 Thousands/µL 0 04   Sodium      136 - 145 mmol/L 138   Potassium      3 5 - 5 3 mmol/L 4 6   Chloride      100 - 108 mmol/L 105   CO2      21 - 32 mmol/L 28   Anion Gap      4 - 13 mmol/L 5   BUN      5 - 25 mg/dL 20   Creatinine      0 60 - 1 30 mg/dL 0 94   GLUCOSE FASTING      65 - 99 mg/dL 81   Calcium      8 3 - 10 1 mg/dL 9 8   AST      5 - 45 U/L 10   ALT      12 - 78 U/L 17   Alkaline Phosphatase      46 - 116 U/L 48   Total Protein      6 4 - 8 2 g/dL 7 5   Albumin      3 5 - 5 0 g/dL 4 0   TOTAL BILIRUBIN      0 20 - 1 00 mg/dL 0 51   eGFR      ml/min/1 73sq m 64   Vit D, 25-Hydroxy      30 0 - 100 0 ng/mL 57 2   TSH 3RD GENERATON      0 358 - 3 740 uIU/mL 1 400

## 2020-03-20 DIAGNOSIS — M16.12 UNILATERAL OSTEOARTHRITIS OF HIP, LEFT: ICD-10-CM

## 2020-03-20 DIAGNOSIS — M46.1 SACROILIITIS (HCC): ICD-10-CM

## 2020-03-20 RX ORDER — MELOXICAM 15 MG/1
TABLET ORAL
Qty: 30 TABLET | Refills: 1 | Status: SHIPPED | OUTPATIENT
Start: 2020-03-20 | End: 2020-05-08 | Stop reason: SDUPTHER

## 2020-05-08 DIAGNOSIS — M46.1 SACROILIITIS (HCC): ICD-10-CM

## 2020-05-08 DIAGNOSIS — M16.12 UNILATERAL OSTEOARTHRITIS OF HIP, LEFT: ICD-10-CM

## 2020-05-08 RX ORDER — MELOXICAM 15 MG/1
15 TABLET ORAL DAILY
Qty: 30 TABLET | Refills: 1 | Status: ON HOLD | OUTPATIENT
Start: 2020-05-08 | End: 2020-07-07 | Stop reason: ALTCHOICE

## 2020-05-08 RX ORDER — MELOXICAM 15 MG/1
TABLET ORAL
Qty: 30 TABLET | Refills: 1 | OUTPATIENT
Start: 2020-05-08

## 2020-05-13 ENCOUNTER — TELEMEDICINE (OUTPATIENT)
Dept: PAIN MEDICINE | Facility: CLINIC | Age: 65
End: 2020-05-13
Payer: MEDICARE

## 2020-05-13 DIAGNOSIS — M15.0 PRIMARY GENERALIZED (OSTEO)ARTHRITIS: ICD-10-CM

## 2020-05-13 DIAGNOSIS — M16.12 UNILATERAL OSTEOARTHRITIS OF HIP, LEFT: ICD-10-CM

## 2020-05-13 DIAGNOSIS — G89.4 CHRONIC PAIN SYNDROME: ICD-10-CM

## 2020-05-13 DIAGNOSIS — M25.552 PAIN IN LEFT HIP: ICD-10-CM

## 2020-05-13 PROCEDURE — 99442 PR PHYS/QHP TELEPHONE EVALUATION 11-20 MIN: CPT | Performed by: NURSE PRACTITIONER

## 2020-05-27 ENCOUNTER — OFFICE VISIT (OUTPATIENT)
Dept: OBGYN CLINIC | Facility: CLINIC | Age: 65
End: 2020-05-27
Payer: MEDICARE

## 2020-05-27 ENCOUNTER — APPOINTMENT (OUTPATIENT)
Dept: RADIOLOGY | Facility: AMBULARY SURGERY CENTER | Age: 65
End: 2020-05-27
Attending: ORTHOPAEDIC SURGERY
Payer: MEDICARE

## 2020-05-27 VITALS
BODY MASS INDEX: 23.34 KG/M2 | HEIGHT: 64 IN | DIASTOLIC BLOOD PRESSURE: 91 MMHG | SYSTOLIC BLOOD PRESSURE: 154 MMHG | HEART RATE: 72 BPM

## 2020-05-27 DIAGNOSIS — M25.552 PAIN IN LEFT HIP: ICD-10-CM

## 2020-05-27 DIAGNOSIS — M16.12 UNILATERAL OSTEOARTHRITIS OF HIP, LEFT: Primary | ICD-10-CM

## 2020-05-27 DIAGNOSIS — R26.2 AMBULATORY DYSFUNCTION: ICD-10-CM

## 2020-05-27 DIAGNOSIS — Z01.818 PRE-OP TESTING: ICD-10-CM

## 2020-05-27 PROCEDURE — 99214 OFFICE O/P EST MOD 30 MIN: CPT | Performed by: ORTHOPAEDIC SURGERY

## 2020-05-27 PROCEDURE — 73502 X-RAY EXAM HIP UNI 2-3 VIEWS: CPT

## 2020-05-27 RX ORDER — CHLORHEXIDINE GLUCONATE 0.12 MG/ML
15 RINSE ORAL ONCE
Status: CANCELLED | OUTPATIENT
Start: 2020-05-27 | End: 2020-05-27

## 2020-05-27 RX ORDER — CHLORHEXIDINE GLUCONATE 4 G/100ML
SOLUTION TOPICAL DAILY PRN
Status: CANCELLED | OUTPATIENT
Start: 2020-05-27

## 2020-05-27 RX ORDER — DIPHENOXYLATE HYDROCHLORIDE AND ATROPINE SULFATE 2.5; .025 MG/1; MG/1
1 TABLET ORAL DAILY
Qty: 30 TABLET | Refills: 1 | Status: SHIPPED | OUTPATIENT
Start: 2020-05-27 | End: 2020-06-30

## 2020-05-27 RX ORDER — ASCORBIC ACID 500 MG
500 TABLET ORAL DAILY
Qty: 30 TABLET | Refills: 0 | Status: SHIPPED | OUTPATIENT
Start: 2020-05-27 | End: 2020-06-30

## 2020-05-27 RX ORDER — FERROUS SULFATE TAB EC 324 MG (65 MG FE EQUIVALENT) 324 (65 FE) MG
324 TABLET DELAYED RESPONSE ORAL
Qty: 60 TABLET | Refills: 0 | Status: SHIPPED | OUTPATIENT
Start: 2020-05-27 | End: 2020-06-30

## 2020-05-27 RX ORDER — LANOLIN ALCOHOL/MO/W.PET/CERES
400 CREAM (GRAM) TOPICAL DAILY
Qty: 30 TABLET | Refills: 0 | Status: SHIPPED | OUTPATIENT
Start: 2020-05-27 | End: 2020-06-30

## 2020-06-02 ENCOUNTER — LAB (OUTPATIENT)
Dept: LAB | Facility: CLINIC | Age: 65
End: 2020-06-02
Payer: MEDICARE

## 2020-06-02 DIAGNOSIS — M16.12 UNILATERAL OSTEOARTHRITIS OF HIP, LEFT: ICD-10-CM

## 2020-06-02 DIAGNOSIS — Z01.818 PRE-OP TESTING: ICD-10-CM

## 2020-06-02 LAB
25(OH)D3 SERPL-MCNC: 66.8 NG/ML (ref 30–100)
ABO GROUP BLD: NORMAL
ALBUMIN SERPL BCP-MCNC: 4 G/DL (ref 3.5–5)
ALP SERPL-CCNC: 55 U/L (ref 46–116)
ALT SERPL W P-5'-P-CCNC: 21 U/L (ref 12–78)
ANION GAP SERPL CALCULATED.3IONS-SCNC: 9 MMOL/L (ref 4–13)
APTT PPP: 24 SECONDS (ref 23–37)
AST SERPL W P-5'-P-CCNC: 13 U/L (ref 5–45)
ATRIAL RATE: 71 BPM
BASOPHILS # BLD AUTO: 0.08 THOUSANDS/ΜL (ref 0–0.1)
BASOPHILS NFR BLD AUTO: 1 % (ref 0–1)
BILIRUB SERPL-MCNC: 0.44 MG/DL (ref 0.2–1)
BLD GP AB SCN SERPL QL: NEGATIVE
BUN SERPL-MCNC: 14 MG/DL (ref 5–25)
CALCIUM SERPL-MCNC: 9.7 MG/DL (ref 8.3–10.1)
CHLORIDE SERPL-SCNC: 103 MMOL/L (ref 100–108)
CO2 SERPL-SCNC: 26 MMOL/L (ref 21–32)
CREAT SERPL-MCNC: 0.92 MG/DL (ref 0.6–1.3)
CRP SERPL QL: <3 MG/L
EOSINOPHIL # BLD AUTO: 0.28 THOUSAND/ΜL (ref 0–0.61)
EOSINOPHIL NFR BLD AUTO: 4 % (ref 0–6)
ERYTHROCYTE [DISTWIDTH] IN BLOOD BY AUTOMATED COUNT: 12.4 % (ref 11.6–15.1)
FERRITIN SERPL-MCNC: 27 NG/ML (ref 8–388)
GFR SERPL CREATININE-BSD FRML MDRD: 65 ML/MIN/1.73SQ M
GLUCOSE SERPL-MCNC: 93 MG/DL (ref 65–140)
HCT VFR BLD AUTO: 40.6 % (ref 34.8–46.1)
HGB BLD-MCNC: 13.1 G/DL (ref 11.5–15.4)
IMM GRANULOCYTES # BLD AUTO: 0.02 THOUSAND/UL (ref 0–0.2)
IMM GRANULOCYTES NFR BLD AUTO: 0 % (ref 0–2)
INR PPP: 1.01 (ref 0.84–1.19)
IRON SATN MFR SERPL: 31 %
IRON SERPL-MCNC: 131 UG/DL (ref 50–170)
LYMPHOCYTES # BLD AUTO: 2.01 THOUSANDS/ΜL (ref 0.6–4.47)
LYMPHOCYTES NFR BLD AUTO: 28 % (ref 14–44)
MCH RBC QN AUTO: 30.8 PG (ref 26.8–34.3)
MCHC RBC AUTO-ENTMCNC: 32.3 G/DL (ref 31.4–37.4)
MCV RBC AUTO: 95 FL (ref 82–98)
MONOCYTES # BLD AUTO: 0.71 THOUSAND/ΜL (ref 0.17–1.22)
MONOCYTES NFR BLD AUTO: 10 % (ref 4–12)
NEUTROPHILS # BLD AUTO: 4.01 THOUSANDS/ΜL (ref 1.85–7.62)
NEUTS SEG NFR BLD AUTO: 57 % (ref 43–75)
NRBC BLD AUTO-RTO: 0 /100 WBCS
P AXIS: -5 DEGREES
PLATELET # BLD AUTO: 434 THOUSANDS/UL (ref 149–390)
PMV BLD AUTO: 9.5 FL (ref 8.9–12.7)
POTASSIUM SERPL-SCNC: 4.8 MMOL/L (ref 3.5–5.3)
PR INTERVAL: 144 MS
PROT SERPL-MCNC: 7.5 G/DL (ref 6.4–8.2)
PROTHROMBIN TIME: 12.7 SECONDS (ref 11.6–14.5)
QRS AXIS: 37 DEGREES
QRSD INTERVAL: 80 MS
QT INTERVAL: 394 MS
QTC INTERVAL: 428 MS
RBC # BLD AUTO: 4.26 MILLION/UL (ref 3.81–5.12)
RH BLD: POSITIVE
SODIUM SERPL-SCNC: 138 MMOL/L (ref 136–145)
SPECIMEN EXPIRATION DATE: NORMAL
T WAVE AXIS: 23 DEGREES
TIBC SERPL-MCNC: 421 UG/DL (ref 250–450)
VENTRICULAR RATE: 71 BPM
WBC # BLD AUTO: 7.11 THOUSAND/UL (ref 4.31–10.16)

## 2020-06-02 PROCEDURE — 83550 IRON BINDING TEST: CPT

## 2020-06-02 PROCEDURE — 86901 BLOOD TYPING SEROLOGIC RH(D): CPT

## 2020-06-02 PROCEDURE — 80053 COMPREHEN METABOLIC PANEL: CPT

## 2020-06-02 PROCEDURE — 93005 ELECTROCARDIOGRAM TRACING: CPT

## 2020-06-02 PROCEDURE — 93010 ELECTROCARDIOGRAM REPORT: CPT | Performed by: INTERNAL MEDICINE

## 2020-06-02 PROCEDURE — 85730 THROMBOPLASTIN TIME PARTIAL: CPT

## 2020-06-02 PROCEDURE — 86900 BLOOD TYPING SEROLOGIC ABO: CPT

## 2020-06-02 PROCEDURE — 83540 ASSAY OF IRON: CPT

## 2020-06-02 PROCEDURE — 86140 C-REACTIVE PROTEIN: CPT

## 2020-06-02 PROCEDURE — 83036 HEMOGLOBIN GLYCOSYLATED A1C: CPT

## 2020-06-02 PROCEDURE — 85610 PROTHROMBIN TIME: CPT

## 2020-06-02 PROCEDURE — 85025 COMPLETE CBC W/AUTO DIFF WBC: CPT

## 2020-06-02 PROCEDURE — 82728 ASSAY OF FERRITIN: CPT

## 2020-06-02 PROCEDURE — 86850 RBC ANTIBODY SCREEN: CPT

## 2020-06-02 PROCEDURE — 36415 COLL VENOUS BLD VENIPUNCTURE: CPT | Performed by: INTERNAL MEDICINE

## 2020-06-02 PROCEDURE — 82306 VITAMIN D 25 HYDROXY: CPT | Performed by: INTERNAL MEDICINE

## 2020-06-03 LAB
EST. AVERAGE GLUCOSE BLD GHB EST-MCNC: 108 MG/DL
HBA1C MFR BLD: 5.4 %

## 2020-06-17 ENCOUNTER — TELEPHONE (OUTPATIENT)
Dept: OBGYN CLINIC | Facility: HOSPITAL | Age: 65
End: 2020-06-17

## 2020-06-17 NOTE — PRE-PROCEDURE INSTRUCTIONS
Pre-Surgery Instructions:   Medication Instructions    ascorbic acid (VITAMIN C) 500 mg tablet Instructed patient per Anesthesia Guidelines   Calcium 600 MG tablet Instructed patient per Anesthesia Guidelines   Cholecalciferol (VITAMIN D) 2000 units CAPS Instructed patient per Anesthesia Guidelines   ferrous sulfate 324 (65 Fe) mg Instructed patient per Anesthesia Guidelines   folic acid (FOLVITE) 843 mcg tablet Instructed patient per Anesthesia Guidelines   meloxicam (MOBIC) 15 mg tablet Instructed patient per Anesthesia Guidelines   multivitamin (THERAGRAN) TABS Instructed patient per Anesthesia Guidelines   omeprazole (PriLOSEC) 20 mg delayed release capsule Instructed patient per Anesthesia Guidelines     Pre op,medications and showering instructions reviewed-Patient has hibiclens

## 2020-06-20 DIAGNOSIS — M16.12 UNILATERAL OSTEOARTHRITIS OF HIP, LEFT: ICD-10-CM

## 2020-06-23 ENCOUNTER — OFFICE VISIT (OUTPATIENT)
Dept: FAMILY MEDICINE CLINIC | Facility: MEDICAL CENTER | Age: 65
End: 2020-06-23
Payer: MEDICARE

## 2020-06-23 VITALS
HEIGHT: 64 IN | WEIGHT: 139 LBS | DIASTOLIC BLOOD PRESSURE: 78 MMHG | TEMPERATURE: 97.9 F | SYSTOLIC BLOOD PRESSURE: 118 MMHG | HEART RATE: 71 BPM | BODY MASS INDEX: 23.73 KG/M2

## 2020-06-23 DIAGNOSIS — M81.8 OTHER OSTEOPOROSIS WITHOUT CURRENT PATHOLOGICAL FRACTURE: ICD-10-CM

## 2020-06-23 DIAGNOSIS — K21.9 GASTROESOPHAGEAL REFLUX DISEASE, ESOPHAGITIS PRESENCE NOT SPECIFIED: Primary | ICD-10-CM

## 2020-06-23 DIAGNOSIS — M16.12 PRIMARY OSTEOARTHRITIS OF LEFT HIP: ICD-10-CM

## 2020-06-23 PROCEDURE — 4040F PNEUMOC VAC/ADMIN/RCVD: CPT | Performed by: FAMILY MEDICINE

## 2020-06-23 PROCEDURE — 1160F RVW MEDS BY RX/DR IN RCRD: CPT | Performed by: FAMILY MEDICINE

## 2020-06-23 PROCEDURE — 99214 OFFICE O/P EST MOD 30 MIN: CPT | Performed by: FAMILY MEDICINE

## 2020-06-23 PROCEDURE — 1124F ACP DISCUSS-NO DSCNMKR DOCD: CPT | Performed by: FAMILY MEDICINE

## 2020-06-23 PROCEDURE — 1036F TOBACCO NON-USER: CPT | Performed by: FAMILY MEDICINE

## 2020-06-23 PROCEDURE — 3008F BODY MASS INDEX DOCD: CPT | Performed by: FAMILY MEDICINE

## 2020-06-23 NOTE — H&P (VIEW-ONLY)
Lorrie Frank is here for preop clearance  Left total hip arthroplasty on 7/720  Dr Gamez Keep  Spinal anesthesia  She has been in good health  She has esophageal reflux, osteoporosis, arthritis  Gets Prolia injections  She has a history of thrombocytosis which is familial   She has been evaluated by hematology in the past   See preop consult from Dr Gill Grimaldo in May of 2017  She does not require treatment for this condition however postop anticoagulation was recommended at that time    She has a history of DVT after an ACL repair about 20 years ago  She has had prior surgeries with problems with anesthesia  Nausea and vomiting  All Penecillin   No FH of problems with anesthesia  Has FH of thrombocytosis  Fatherwith CHF  No sudden death  SH: Nonsmoker  Alcohol occ  Retired; lives with   Review of Systems   Constitutional: Negative for fatigue, fever and unexpected weight change  HENT: Negative for congestion  Respiratory: Negative for cough and shortness of breath  Cardiovascular: Negative for chest pain, palpitations and leg swelling  Gastrointestinal: Negative for abdominal pain and diarrhea  Genitourinary: Negative for difficulty urinating and dysuria  Skin: Negative for rash  Neurological: Negative for dizziness, light-headedness and headaches  I have reviewed the patient's medical history in detail and updated the computerized patient record  O: /78 (BP Location: Left arm, Patient Position: Sitting, Cuff Size: Adult)   Pulse 71   Temp 97 9 °F (36 6 °C)   Ht 5' 4" (1 626 m)   Wt 63 kg (139 lb)   BMI 23 86 kg/m²   Physical Exam   Constitutional: She is oriented to person, place, and time  She appears well-developed and well-nourished  HENT:   Head: Normocephalic     Right Ear: Tympanic membrane and external ear normal    Left Ear: Tympanic membrane and external ear normal    Nose: Nose normal    Mouth/Throat: Oropharynx is clear and moist    Eyes: Pupils are equal, round, and reactive to light  Conjunctivae and EOM are normal  No scleral icterus  Neck: Normal range of motion  Neck supple  Carotid bruit is not present  No thyroid mass and no thyromegaly present  Cardiovascular: Normal rate, regular rhythm, normal heart sounds and intact distal pulses  Pulses:       Femoral pulses are 2+ on the right side, and 2+ on the left side  Popliteal pulses are 2+ on the right side, and 2+ on the left side  Dorsalis pedis pulses are 2+ on the right side, and 2+ on the left side  Posterior tibial pulses are 2+ on the right side, and 2+ on the left side  Pulmonary/Chest: Effort normal and breath sounds normal  No respiratory distress  She has no wheezes  She has no rales  Abdominal: Soft  Normal aorta and bowel sounds are normal  She exhibits no distension and no mass  There is no hepatosplenomegaly  There is no tenderness  Musculoskeletal: Normal range of motion  She exhibits no edema  Lymphadenopathy:        Head (right side): No submandibular and no occipital adenopathy present  Head (left side): No submandibular and no occipital adenopathy present  She has no cervical adenopathy  Right: No inguinal and no supraclavicular adenopathy present  Left: No inguinal and no supraclavicular adenopathy present  Neurological: She is oriented to person, place, and time  Skin: No lesion and no rash noted  Psychiatric: She has a normal mood and affect  Her behavior is normal      Assessment  1  Osteoarthritis left hip-scheduled for total hip replacement  2  Thrombocytosis-familial with previous workup  Platelet count is been very stable over the last several years and under 450,000  Postop anticoagulation previously recommended   3  Hearing loss-wears hearing aids  4  Gastroesophageal reflux  5  History of DVT after knee surgery    BW 6/2 CBC platelet count [id number] EKG WNL    Plan  She is medically stable    Medically cleared for hip replacement

## 2020-06-25 ENCOUNTER — APPOINTMENT (OUTPATIENT)
Dept: PREADMISSION TESTING | Facility: HOSPITAL | Age: 65
DRG: 470 | End: 2020-06-25
Payer: MEDICARE

## 2020-06-30 ENCOUNTER — EVALUATION (OUTPATIENT)
Dept: PHYSICAL THERAPY | Facility: MEDICAL CENTER | Age: 65
End: 2020-06-30
Payer: MEDICARE

## 2020-06-30 DIAGNOSIS — M16.12 UNILATERAL OSTEOARTHRITIS OF HIP, LEFT: ICD-10-CM

## 2020-06-30 PROCEDURE — 97161 PT EVAL LOW COMPLEX 20 MIN: CPT | Performed by: PHYSICAL THERAPIST

## 2020-06-30 RX ORDER — LORATADINE 10 MG
TABLET ORAL
Qty: 30 TABLET | Refills: 0 | Status: SHIPPED | OUTPATIENT
Start: 2020-06-30 | End: 2020-07-14

## 2020-06-30 RX ORDER — LANOLIN ALCOHOL/MO/W.PET/CERES
400 CREAM (GRAM) TOPICAL DAILY
Qty: 30 TABLET | Refills: 0 | Status: SHIPPED | OUTPATIENT
Start: 2020-06-30 | End: 2020-07-14

## 2020-06-30 RX ORDER — FERROUS SULFATE TAB EC 324 MG (65 MG FE EQUIVALENT) 324 (65 FE) MG
324 TABLET DELAYED RESPONSE ORAL
Qty: 60 TABLET | Refills: 0 | Status: SHIPPED | OUTPATIENT
Start: 2020-06-30 | End: 2020-07-14

## 2020-06-30 RX ORDER — SIMETHICONE 40MG/0.6ML
SUSPENSION, DROPS(FINAL DOSAGE FORM)(ML) ORAL
Qty: 30 TABLET | Refills: 1 | Status: SHIPPED | OUTPATIENT
Start: 2020-06-30 | End: 2020-07-14

## 2020-07-01 DIAGNOSIS — M46.1 SACROILIITIS (HCC): ICD-10-CM

## 2020-07-01 DIAGNOSIS — M16.12 UNILATERAL OSTEOARTHRITIS OF HIP, LEFT: ICD-10-CM

## 2020-07-01 RX ORDER — MELOXICAM 15 MG/1
TABLET ORAL
Qty: 30 TABLET | Refills: 1 | OUTPATIENT
Start: 2020-07-01

## 2020-07-02 DIAGNOSIS — M16.12 UNILATERAL OSTEOARTHRITIS OF HIP, LEFT: ICD-10-CM

## 2020-07-02 DIAGNOSIS — Z01.818 PRE-OP TESTING: ICD-10-CM

## 2020-07-02 PROCEDURE — U0003 INFECTIOUS AGENT DETECTION BY NUCLEIC ACID (DNA OR RNA); SEVERE ACUTE RESPIRATORY SYNDROME CORONAVIRUS 2 (SARS-COV-2) (CORONAVIRUS DISEASE [COVID-19]), AMPLIFIED PROBE TECHNIQUE, MAKING USE OF HIGH THROUGHPUT TECHNOLOGIES AS DESCRIBED BY CMS-2020-01-R: HCPCS | Performed by: OBSTETRICS & GYNECOLOGY

## 2020-07-06 ENCOUNTER — ANESTHESIA EVENT (OUTPATIENT)
Dept: PERIOP | Facility: HOSPITAL | Age: 65
DRG: 470 | End: 2020-07-06
Payer: MEDICARE

## 2020-07-07 ENCOUNTER — HOSPITAL ENCOUNTER (INPATIENT)
Facility: HOSPITAL | Age: 65
LOS: 1 days | Discharge: HOME | DRG: 470 | End: 2020-07-09
Attending: ORTHOPAEDIC SURGERY | Admitting: ORTHOPAEDIC SURGERY
Payer: MEDICARE

## 2020-07-07 ENCOUNTER — ANESTHESIA (OUTPATIENT)
Dept: PERIOP | Facility: HOSPITAL | Age: 65
DRG: 470 | End: 2020-07-07
Payer: MEDICARE

## 2020-07-07 DIAGNOSIS — M16.12 UNILATERAL OSTEOARTHRITIS OF HIP, LEFT: Primary | ICD-10-CM

## 2020-07-07 LAB
ABO GROUP BLD: NORMAL
BLD GP AB SCN SERPL QL: NEGATIVE
GLUCOSE SERPL-MCNC: 97 MG/DL (ref 65–140)
RH BLD: POSITIVE
SARS-COV-2 RNA RESP QL NAA+PROBE: NEGATIVE
SPECIMEN EXPIRATION DATE: NORMAL

## 2020-07-07 PROCEDURE — 27130 TOTAL HIP ARTHROPLASTY: CPT | Performed by: PHYSICIAN ASSISTANT

## 2020-07-07 PROCEDURE — 86850 RBC ANTIBODY SCREEN: CPT | Performed by: ORTHOPAEDIC SURGERY

## 2020-07-07 PROCEDURE — 86901 BLOOD TYPING SEROLOGIC RH(D): CPT | Performed by: ORTHOPAEDIC SURGERY

## 2020-07-07 PROCEDURE — C1713 ANCHOR/SCREW BN/BN,TIS/BN: HCPCS | Performed by: ORTHOPAEDIC SURGERY

## 2020-07-07 PROCEDURE — 0SRB0JZ REPLACEMENT OF LEFT HIP JOINT WITH SYNTHETIC SUBSTITUTE, OPEN APPROACH: ICD-10-PCS | Performed by: ORTHOPAEDIC SURGERY

## 2020-07-07 PROCEDURE — 86900 BLOOD TYPING SEROLOGIC ABO: CPT | Performed by: ORTHOPAEDIC SURGERY

## 2020-07-07 PROCEDURE — 27130 TOTAL HIP ARTHROPLASTY: CPT | Performed by: ORTHOPAEDIC SURGERY

## 2020-07-07 PROCEDURE — C1776 JOINT DEVICE (IMPLANTABLE): HCPCS | Performed by: ORTHOPAEDIC SURGERY

## 2020-07-07 PROCEDURE — 82948 REAGENT STRIP/BLOOD GLUCOSE: CPT

## 2020-07-07 PROCEDURE — 87635 SARS-COV-2 COVID-19 AMP PRB: CPT | Performed by: UROLOGY

## 2020-07-07 DEVICE — TRI-LOCK BPS FEMORAL STEM 12/14 TAPER TRI-LOCK BPS W/GRIPTION SIZE 5 HI 105MM
Type: IMPLANTABLE DEVICE | Site: HIP | Status: FUNCTIONAL
Brand: TRI-LOCK GRIPTION

## 2020-07-07 DEVICE — PINNACLE CANCELLOUS BONE SCREW 6.5MM X 15MM
Type: IMPLANTABLE DEVICE | Site: HIP | Status: FUNCTIONAL
Brand: PINNACLE

## 2020-07-07 DEVICE — PINNACLE POROCOAT ACETABULAR SHELL SECTOR II 48MM OD
Type: IMPLANTABLE DEVICE | Site: HIP | Status: FUNCTIONAL
Brand: PINNACLE POROCOAT

## 2020-07-07 DEVICE — ARTICUL/EZE FEMORAL HEAD DIAMETER 32MM +5 12/14 TAPER
Type: IMPLANTABLE DEVICE | Site: HIP | Status: FUNCTIONAL
Brand: ARTICUL/EZE

## 2020-07-07 DEVICE — PINNACLE HIP SOLUTIONS ALTRX POLYETHYLENE ACETABULAR LINER +4 NEUTRAL 32MM ID 48MM OD
Type: IMPLANTABLE DEVICE | Site: HIP | Status: FUNCTIONAL
Brand: PINNACLE ALTRX

## 2020-07-07 RX ORDER — GLYCOPYRROLATE 0.2 MG/ML
INJECTION INTRAMUSCULAR; INTRAVENOUS AS NEEDED
Status: DISCONTINUED | OUTPATIENT
Start: 2020-07-07 | End: 2020-07-07 | Stop reason: SURG

## 2020-07-07 RX ORDER — FOLIC ACID 1 MG/1
1 TABLET ORAL DAILY
Status: DISCONTINUED | OUTPATIENT
Start: 2020-07-07 | End: 2020-07-09 | Stop reason: HOSPADM

## 2020-07-07 RX ORDER — LORATADINE 10 MG/1
10 TABLET ORAL DAILY
Status: DISCONTINUED | OUTPATIENT
Start: 2020-07-08 | End: 2020-07-09 | Stop reason: HOSPADM

## 2020-07-07 RX ORDER — BUPIVACAINE HYDROCHLORIDE 5 MG/ML
INJECTION, SOLUTION EPIDURAL; INTRACAUDAL AS NEEDED
Status: DISCONTINUED | OUTPATIENT
Start: 2020-07-07 | End: 2020-07-07 | Stop reason: SURG

## 2020-07-07 RX ORDER — ONDANSETRON 2 MG/ML
INJECTION INTRAMUSCULAR; INTRAVENOUS AS NEEDED
Status: DISCONTINUED | OUTPATIENT
Start: 2020-07-07 | End: 2020-07-07 | Stop reason: SURG

## 2020-07-07 RX ORDER — ONDANSETRON 2 MG/ML
4 INJECTION INTRAMUSCULAR; INTRAVENOUS EVERY 6 HOURS PRN
Status: DISCONTINUED | OUTPATIENT
Start: 2020-07-07 | End: 2020-07-09 | Stop reason: HOSPADM

## 2020-07-07 RX ORDER — MEPERIDINE HYDROCHLORIDE 25 MG/ML
25 INJECTION INTRAMUSCULAR; INTRAVENOUS; SUBCUTANEOUS ONCE AS NEEDED
Status: DISCONTINUED | OUTPATIENT
Start: 2020-07-07 | End: 2020-07-07 | Stop reason: HOSPADM

## 2020-07-07 RX ORDER — LIDOCAINE HYDROCHLORIDE 10 MG/ML
INJECTION, SOLUTION EPIDURAL; INFILTRATION; INTRACAUDAL; PERINEURAL AS NEEDED
Status: DISCONTINUED | OUTPATIENT
Start: 2020-07-07 | End: 2020-07-07 | Stop reason: SURG

## 2020-07-07 RX ORDER — LORATADINE 10 MG/1
10 TABLET ORAL DAILY
COMMUNITY
End: 2021-11-05 | Stop reason: ALTCHOICE

## 2020-07-07 RX ORDER — MAGNESIUM HYDROXIDE 1200 MG/15ML
LIQUID ORAL AS NEEDED
Status: DISCONTINUED | OUTPATIENT
Start: 2020-07-07 | End: 2020-07-07 | Stop reason: HOSPADM

## 2020-07-07 RX ORDER — FERROUS SULFATE 325(65) MG
325 TABLET ORAL 2 TIMES DAILY WITH MEALS
Status: DISCONTINUED | OUTPATIENT
Start: 2020-07-07 | End: 2020-07-09 | Stop reason: HOSPADM

## 2020-07-07 RX ORDER — LIDOCAINE HYDROCHLORIDE 10 MG/ML
0.5 INJECTION, SOLUTION EPIDURAL; INFILTRATION; INTRACAUDAL; PERINEURAL ONCE AS NEEDED
Status: DISCONTINUED | OUTPATIENT
Start: 2020-07-07 | End: 2020-07-07 | Stop reason: HOSPADM

## 2020-07-07 RX ORDER — CALCIUM CARBONATE 200(500)MG
1000 TABLET,CHEWABLE ORAL DAILY PRN
Status: DISCONTINUED | OUTPATIENT
Start: 2020-07-07 | End: 2020-07-08

## 2020-07-07 RX ORDER — PANTOPRAZOLE SODIUM 40 MG/1
40 TABLET, DELAYED RELEASE ORAL DAILY
Status: DISCONTINUED | OUTPATIENT
Start: 2020-07-07 | End: 2020-07-09 | Stop reason: HOSPADM

## 2020-07-07 RX ORDER — MIDAZOLAM HYDROCHLORIDE 2 MG/2ML
INJECTION, SOLUTION INTRAMUSCULAR; INTRAVENOUS AS NEEDED
Status: DISCONTINUED | OUTPATIENT
Start: 2020-07-07 | End: 2020-07-07 | Stop reason: SURG

## 2020-07-07 RX ORDER — CHLORHEXIDINE GLUCONATE 4 G/100ML
SOLUTION TOPICAL DAILY PRN
Status: DISCONTINUED | OUTPATIENT
Start: 2020-07-07 | End: 2020-07-07 | Stop reason: HOSPADM

## 2020-07-07 RX ORDER — ROPIVACAINE HYDROCHLORIDE 5 MG/ML
INJECTION, SOLUTION EPIDURAL; INFILTRATION; PERINEURAL AS NEEDED
Status: DISCONTINUED | OUTPATIENT
Start: 2020-07-07 | End: 2020-07-07

## 2020-07-07 RX ORDER — CLINDAMYCIN PHOSPHATE 900 MG/50ML
900 INJECTION INTRAVENOUS ONCE
Status: COMPLETED | OUTPATIENT
Start: 2020-07-07 | End: 2020-07-07

## 2020-07-07 RX ORDER — CALCIUM CARBONATE 500(1250)
1 TABLET ORAL
Status: DISCONTINUED | OUTPATIENT
Start: 2020-07-08 | End: 2020-07-09 | Stop reason: HOSPADM

## 2020-07-07 RX ORDER — HYDROMORPHONE HCL/PF 1 MG/ML
SYRINGE (ML) INJECTION AS NEEDED
Status: DISCONTINUED | OUTPATIENT
Start: 2020-07-07 | End: 2020-07-07 | Stop reason: SURG

## 2020-07-07 RX ORDER — PROPOFOL 10 MG/ML
INJECTION, EMULSION INTRAVENOUS AS NEEDED
Status: DISCONTINUED | OUTPATIENT
Start: 2020-07-07 | End: 2020-07-07 | Stop reason: SURG

## 2020-07-07 RX ORDER — OXYCODONE HYDROCHLORIDE 5 MG/1
5 TABLET ORAL EVERY 4 HOURS PRN
Status: DISCONTINUED | OUTPATIENT
Start: 2020-07-07 | End: 2020-07-09 | Stop reason: HOSPADM

## 2020-07-07 RX ORDER — OXYCODONE HYDROCHLORIDE 10 MG/1
10 TABLET ORAL EVERY 4 HOURS PRN
Status: DISCONTINUED | OUTPATIENT
Start: 2020-07-07 | End: 2020-07-09 | Stop reason: HOSPADM

## 2020-07-07 RX ORDER — FENTANYL CITRATE/PF 50 MCG/ML
25 SYRINGE (ML) INJECTION
Status: DISCONTINUED | OUTPATIENT
Start: 2020-07-07 | End: 2020-07-07 | Stop reason: HOSPADM

## 2020-07-07 RX ORDER — OXYCODONE HYDROCHLORIDE 5 MG/1
5 TABLET ORAL EVERY 4 HOURS PRN
Qty: 30 TABLET | Refills: 0 | Status: SHIPPED | OUTPATIENT
Start: 2020-07-07 | End: 2020-07-17

## 2020-07-07 RX ORDER — SODIUM CHLORIDE 9 MG/ML
75 INJECTION, SOLUTION INTRAVENOUS CONTINUOUS
Status: DISCONTINUED | OUTPATIENT
Start: 2020-07-07 | End: 2020-07-08

## 2020-07-07 RX ORDER — MORPHINE SULFATE 4 MG/ML
2 INJECTION, SOLUTION INTRAMUSCULAR; INTRAVENOUS EVERY 2 HOUR PRN
Status: DISCONTINUED | OUTPATIENT
Start: 2020-07-07 | End: 2020-07-09 | Stop reason: HOSPADM

## 2020-07-07 RX ORDER — CLINDAMYCIN PHOSPHATE 600 MG/50ML
600 INJECTION INTRAVENOUS EVERY 8 HOURS
Status: COMPLETED | OUTPATIENT
Start: 2020-07-07 | End: 2020-07-08

## 2020-07-07 RX ORDER — HYDROMORPHONE HCL/PF 1 MG/ML
0.5 SYRINGE (ML) INJECTION
Status: DISCONTINUED | OUTPATIENT
Start: 2020-07-07 | End: 2020-07-07 | Stop reason: HOSPADM

## 2020-07-07 RX ORDER — DOCUSATE SODIUM 100 MG/1
100 CAPSULE, LIQUID FILLED ORAL 2 TIMES DAILY
Qty: 10 CAPSULE | Refills: 0 | Status: SHIPPED | OUTPATIENT
Start: 2020-07-07 | End: 2020-11-04 | Stop reason: ALTCHOICE

## 2020-07-07 RX ORDER — PROPOFOL 10 MG/ML
INJECTION, EMULSION INTRAVENOUS CONTINUOUS PRN
Status: DISCONTINUED | OUTPATIENT
Start: 2020-07-07 | End: 2020-07-07 | Stop reason: SURG

## 2020-07-07 RX ORDER — EPHEDRINE SULFATE 50 MG/ML
INJECTION INTRAVENOUS AS NEEDED
Status: DISCONTINUED | OUTPATIENT
Start: 2020-07-07 | End: 2020-07-07 | Stop reason: SURG

## 2020-07-07 RX ORDER — CHLORHEXIDINE GLUCONATE 0.12 MG/ML
15 RINSE ORAL ONCE
Status: COMPLETED | OUTPATIENT
Start: 2020-07-07 | End: 2020-07-07

## 2020-07-07 RX ORDER — ACETAMINOPHEN 325 MG/1
650 TABLET ORAL EVERY 6 HOURS PRN
Status: DISCONTINUED | OUTPATIENT
Start: 2020-07-07 | End: 2020-07-09 | Stop reason: HOSPADM

## 2020-07-07 RX ORDER — SODIUM CHLORIDE, SODIUM LACTATE, POTASSIUM CHLORIDE, CALCIUM CHLORIDE 600; 310; 30; 20 MG/100ML; MG/100ML; MG/100ML; MG/100ML
125 INJECTION, SOLUTION INTRAVENOUS CONTINUOUS
Status: DISCONTINUED | OUTPATIENT
Start: 2020-07-07 | End: 2020-07-07

## 2020-07-07 RX ORDER — ASCORBIC ACID 500 MG
500 TABLET ORAL 2 TIMES DAILY
Status: DISCONTINUED | OUTPATIENT
Start: 2020-07-07 | End: 2020-07-09 | Stop reason: HOSPADM

## 2020-07-07 RX ADMIN — SODIUM CHLORIDE, SODIUM LACTATE, POTASSIUM CHLORIDE, AND CALCIUM CHLORIDE: .6; .31; .03; .02 INJECTION, SOLUTION INTRAVENOUS at 13:20

## 2020-07-07 RX ADMIN — CALCIUM CARBONATE (ANTACID) CHEW TAB 500 MG 1000 MG: 500 CHEW TAB at 20:21

## 2020-07-07 RX ADMIN — HYDROMORPHONE HYDROCHLORIDE 0.25 MG: 1 INJECTION, SOLUTION INTRAMUSCULAR; INTRAVENOUS; SUBCUTANEOUS at 14:31

## 2020-07-07 RX ADMIN — OXYCODONE HYDROCHLORIDE 5 MG: 5 TABLET ORAL at 16:32

## 2020-07-07 RX ADMIN — GLYCOPYRROLATE 0.2 MG: 0.2 INJECTION, SOLUTION INTRAMUSCULAR; INTRAVENOUS at 12:51

## 2020-07-07 RX ADMIN — ONDANSETRON 4 MG: 2 INJECTION INTRAMUSCULAR; INTRAVENOUS at 12:42

## 2020-07-07 RX ADMIN — EPHEDRINE SULFATE 10 MG: 50 INJECTION, SOLUTION INTRAVENOUS at 12:56

## 2020-07-07 RX ADMIN — PROPOFOL 30 MCG/KG/MIN: 10 INJECTION, EMULSION INTRAVENOUS at 12:42

## 2020-07-07 RX ADMIN — FENTANYL CITRATE 25 MCG: 50 INJECTION INTRAMUSCULAR; INTRAVENOUS at 14:37

## 2020-07-07 RX ADMIN — FENTANYL CITRATE 25 MCG: 50 INJECTION INTRAMUSCULAR; INTRAVENOUS at 14:41

## 2020-07-07 RX ADMIN — PHENYLEPHRINE HYDROCHLORIDE 100 MCG: 10 INJECTION INTRAVENOUS at 12:46

## 2020-07-07 RX ADMIN — MORPHINE SULFATE 2 MG: 4 INJECTION INTRAVENOUS at 19:35

## 2020-07-07 RX ADMIN — HYDROMORPHONE HYDROCHLORIDE 0.5 MG: 1 INJECTION, SOLUTION INTRAMUSCULAR; INTRAVENOUS; SUBCUTANEOUS at 14:52

## 2020-07-07 RX ADMIN — PROPOFOL 30 MG: 10 INJECTION, EMULSION INTRAVENOUS at 12:42

## 2020-07-07 RX ADMIN — SODIUM CHLORIDE 75 ML/HR: 0.9 INJECTION, SOLUTION INTRAVENOUS at 19:38

## 2020-07-07 RX ADMIN — PHENYLEPHRINE HYDROCHLORIDE 100 MCG: 10 INJECTION INTRAVENOUS at 13:18

## 2020-07-07 RX ADMIN — Medication 1000 MG: at 12:52

## 2020-07-07 RX ADMIN — BUPIVACAINE HYDROCHLORIDE 1.6 ML: 5 INJECTION, SOLUTION EPIDURAL; INTRACAUDAL at 12:38

## 2020-07-07 RX ADMIN — PHENYLEPHRINE HYDROCHLORIDE 30 MCG/MIN: 10 INJECTION INTRAVENOUS at 13:00

## 2020-07-07 RX ADMIN — HYDROMORPHONE HYDROCHLORIDE 0.5 MG: 1 INJECTION, SOLUTION INTRAMUSCULAR; INTRAVENOUS; SUBCUTANEOUS at 15:04

## 2020-07-07 RX ADMIN — CLINDAMYCIN PHOSPHATE 900 MG: 18 INJECTION, SOLUTION INTRAMUSCULAR; INTRAVENOUS at 12:40

## 2020-07-07 RX ADMIN — PHENYLEPHRINE HYDROCHLORIDE 100 MCG: 10 INJECTION INTRAVENOUS at 12:48

## 2020-07-07 RX ADMIN — SODIUM CHLORIDE, SODIUM LACTATE, POTASSIUM CHLORIDE, AND CALCIUM CHLORIDE: .6; .31; .03; .02 INJECTION, SOLUTION INTRAVENOUS at 12:30

## 2020-07-07 RX ADMIN — LIDOCAINE HYDROCHLORIDE 50 ML: 10 INJECTION, SOLUTION EPIDURAL; INFILTRATION; INTRACAUDAL; PERINEURAL at 12:42

## 2020-07-07 RX ADMIN — MIDAZOLAM HYDROCHLORIDE 2 MG: 1 INJECTION, SOLUTION INTRAMUSCULAR; INTRAVENOUS at 12:30

## 2020-07-07 RX ADMIN — PHENYLEPHRINE HYDROCHLORIDE 100 MCG: 10 INJECTION INTRAVENOUS at 13:00

## 2020-07-07 RX ADMIN — HYDROMORPHONE HYDROCHLORIDE 0.25 MG: 1 INJECTION, SOLUTION INTRAMUSCULAR; INTRAVENOUS; SUBCUTANEOUS at 14:25

## 2020-07-07 RX ADMIN — CLINDAMYCIN PHOSPHATE 600 MG: 600 INJECTION, SOLUTION INTRAVENOUS at 19:43

## 2020-07-07 RX ADMIN — HYDROMORPHONE HYDROCHLORIDE 0.5 MG: 1 INJECTION, SOLUTION INTRAMUSCULAR; INTRAVENOUS; SUBCUTANEOUS at 14:44

## 2020-07-07 RX ADMIN — CHLORHEXIDINE GLUCONATE 0.12% ORAL RINSE 15 ML: 1.2 LIQUID ORAL at 11:30

## 2020-07-07 RX ADMIN — OXYCODONE HYDROCHLORIDE 10 MG: 10 TABLET ORAL at 22:23

## 2020-07-07 RX ADMIN — PHENYLEPHRINE HYDROCHLORIDE 100 MCG: 10 INJECTION INTRAVENOUS at 13:08

## 2020-07-07 RX ADMIN — EPHEDRINE SULFATE 5 MG: 50 INJECTION, SOLUTION INTRAVENOUS at 13:19

## 2020-07-07 NOTE — ANESTHESIA POSTPROCEDURE EVALUATION
Post-Op Assessment Note    CV Status:  Stable  Pain Score: 0    Pain management: adequate     Mental Status:  Alert and awake   Hydration Status:  Euvolemic   PONV Controlled:  Controlled   Airway Patency:  Patent and adequate   Post Op Vitals Reviewed: Yes      Staff: CRNA           BP   111/70   Temp   97 9   Pulse  97   Resp   14   SpO2   100

## 2020-07-07 NOTE — DISCHARGE INSTRUCTIONS
Discharge Instructions:    Weight Bearing Status:                                           Weight Bearing as tolerated     Be sure to maintain Hip Precautions (no bending at waist, no crossing legs, or internally rotating surgical leg)  Use your abduction pillow in between your legs at night until 6 weeks postoperatively  DVT prophylaxis:  Complete course of Lovenox as directed  One injection into abdomen daily for 30 days  After completing your course of Lovenox, take one tablet of 325mg twice a day up until 6 months postoperatively  Wear your thigh high JAN compression stockings until 6 months postoperatively  Pain:  You have been prescribed a narcotic  Take this sparingly  May take one tab every 4-6 hrs AS NEEDED for pain  Showering/Dressing Instructions:   Keep dressings clean and dry for 5 days  May change dressings daily  May shower after 5 days postoperatively  Do not soak the incision  Please clean incision with alcohol after showers until postoperative appointment  Driving Instructions:  No driving until cleared by Orthopaedic Surgery  PT/OT:  Continue PT/OT on outpatient basis as directed    Appt Instructions: If you do not have your appointment, please call the clinic at [phone] to follow up with Dr Karolina Norton in the clinic    If you develop significant pain in the calf, calf discoloration/swelling - call the office or go to the emergency department as these may be signs of a blood clot   Contact the office sooner if you experience any increased numbness/tingling in the extremities

## 2020-07-07 NOTE — ANESTHESIA PROCEDURE NOTES
Spinal Block    Patient location during procedure: OR  Start time: 7/7/2020 12:39 PM  Reason for block: primary anesthetic  Staffing  Anesthesiologist: Selin Lei MD  Performed: anesthesiologist   Preanesthetic Checklist  Completed: patient identified, surgical consent, pre-op evaluation, timeout performed, IV checked, risks and benefits discussed and monitors and equipment checked  Spinal Block  Patient position: sitting  Prep: Betadine and site prepped and draped  Patient monitoring: heart rate, continuous pulse ox and frequent blood pressure checks  Approach: midline  Location: L2-3  Injection technique: single-shot  Needle  Needle type: pencil-tip   Needle gauge: 25 G  Needle length: 5 cm  Assessment  Events: paresthesia and cerebrospinal fluid  Injection Assessment:  negative aspiration for heme, no paresthesia on injection and positive aspiration for clear CSF    Post-procedure:  site cleaned

## 2020-07-07 NOTE — ANESTHESIA PREPROCEDURE EVALUATION
Review of Systems/Medical History  Patient summary reviewed  Chart reviewed  History of anesthetic complications PONV    Cardiovascular  EKG reviewed, Hyperlipidemia,    Pulmonary       GI/Hepatic    GERD well controlled,             Endo/Other     GYN       Hematology   Musculoskeletal  Back pain , lumbar pain, Osteoarthritis,   Arthritis     Neurology   Psychology   Anxiety,              Physical Exam    Airway    Mallampati score: I  TM Distance: >3 FB  Neck ROM: full     Dental   No notable dental hx     Cardiovascular      Pulmonary      Other Findings        Anesthesia Plan  ASA Score- 2     Anesthesia Type- spinal with ASA Monitors  Additional Monitors:   Airway Plan:         Plan Factors-  Patient did not smoke on day of surgery  Induction-     Postoperative Plan- Plan for postoperative opioid use  Informed Consent- Anesthetic plan and risks discussed with patient  I personally reviewed this patient with the CRNA  Discussed and agreed on the Anesthesia Plan with the CRNA  Guillermo Cowdrey

## 2020-07-07 NOTE — INTERVAL H&P NOTE
H&P reviewed  After examining the patient I find no changes in the patients condition since the H&P had been written      Vitals:    07/07/20 1026   BP: 137/87   Pulse: 82   Resp: 19   Temp: 98 °F (36 7 °C)   SpO2: 99%

## 2020-07-07 NOTE — OP NOTE
OPERATIVE REPORT  PATIENT NAME: Carolyn Albarado    :  1955  MRN: [id number]  Pt Location: AN OR ROOM 01    SURGERY DATE: 2020    Surgeon(s) and Role:     * Rivera Huntley, DO - Primary  Shilpa Cox Pac utilized during the case for assistance with prepping draping positioning retraction of soft tissue during approach and implantation as well as trials reduction and dislocation  Mr Venice Herrera also help with closure of the wound was suturing as well as dressing application  Preop Diagnosis:  Unilateral osteoarthritis of hip, left [M16 12]    Post-Op Diagnosis Codes:     * Unilateral osteoarthritis of hip, left [M16 12]    Procedure(s) (LRB):  HIP TOTAL ARTHROPLASTY (Left)    Specimen(s):  * No specimens in log *    Estimated Blood Loss:   Minimal    Drains:  * No LDAs found *    Anesthesia Type:   Spinal    Operative Indications:  Unilateral osteoarthritis of hip, left [M16 12]      Operative Findings:  Severe osteoarthritis involving the left femoral head and acetabulum    Complications:   None    Procedure and Technique:  Patient was identified in the preoperative area the left lower extremity was marked  The consent and H and P had been reviewed  The patient was brought back to the operative suite where she was given a spinal anesthetic/general sedation  Patient was then positioned in a side-lying position lying on the non operative leg  There was an axillary roll underneath the axilla  A Stulberg  positioner was utilized to position the patient and proper positioning  The left lower extremity was prepped and draped in a sterile fashion  After proper time-out commenced identified the left lower extremity as the operative site and injection with her mixture was given into the subdermal tissue and deeper tissue  A curvilinear incision was made in line with the femur and along the posterior 3rd of the greater trochanter  We dissected down through the fatty layers down to the fascia   We made incision in the fascia in line with the skin incision  We identified our piriformis detached it from its insertion  We passed 5  Ethibond through the tendon  We utilized this later for reattachment  We then made a T in our capsule  We also passed 5  Ethibond into the capsule for later reattachment  We identified our femoral neck  Approximately 1 cm proximal to the lesser trochanter we made a saw blade cut along the femoral neck  We placed retractors anterior to hold the femur anterior we had the Charnley retractor as well  We excised excess pulvinar  We then used the acetabular Reamer in a stepwise fashion 1st medialized thing and reaming up to a size 48 reamer  We then implanted a size 40 acetabulum had a good fit with posterior wall angulation of approximately 45° of abduction and anteversion of 25 degrees  We placed 1 screws to fix the acetabulum to the acetabular wall  Screws were placed posterior superior  We then moved onto the femur we opened the box cut and 25° of anteversion  We then used a canal finer  We then moved onto our broach we broached up to a size 5  We implanted the size 5 femur  We then trialed our offset and our femoral heads  We found a good fit with a size 32 head had and a size 5 femur with high offset  We found some lateral shocking so we decided to go with a +  acetabular liner  We found rotational stability at 70 to 80°  We removed the femoral trial implant then implanted our size 5 femoral implant  Irrigated the wound and joint  Drilled 3 holes through the greater trochanteric area for suture passage of capsule and piriformis tendon  We closed the capsule prior to passage of sutures  We tied the sutures on the lateral cortex  We irrigated once more closed the fascia with 0 Vicryl  Enid's fascia with 0 Vicryl  Subcutaneous fascia with 2 O Vicryl staples on skin  Acticoat 4x4s ABD and foam tape were applied to the incision  A foam abduction pillow was also applied   Dandy stockings were applied to both lower extremities  Anesthesia was reversed the patient was taken back to PACU in stable condition     I was present for the entire procedure, I was present for all critical portions of the procedure and A qualified resident physician was not available    Patient Disposition:  PACU     SIGNATURE: Myrna Pedroza DO  DATE: July 7, 2020  TIME: 11:58 AM

## 2020-07-08 LAB
ANION GAP SERPL CALCULATED.3IONS-SCNC: 6 MMOL/L (ref 4–13)
BUN SERPL-MCNC: 11 MG/DL (ref 5–25)
CALCIUM SERPL-MCNC: 7.8 MG/DL (ref 8.3–10.1)
CHLORIDE SERPL-SCNC: 103 MMOL/L (ref 100–108)
CO2 SERPL-SCNC: 26 MMOL/L (ref 21–32)
CREAT SERPL-MCNC: 0.67 MG/DL (ref 0.6–1.3)
ERYTHROCYTE [DISTWIDTH] IN BLOOD BY AUTOMATED COUNT: 13.2 % (ref 11.6–15.1)
GFR SERPL CREATININE-BSD FRML MDRD: 92 ML/MIN/1.73SQ M
GLUCOSE SERPL-MCNC: 116 MG/DL (ref 65–140)
HCT VFR BLD AUTO: 31 % (ref 34.8–46.1)
HGB BLD-MCNC: 10.2 G/DL (ref 11.5–15.4)
MCH RBC QN AUTO: 31.2 PG (ref 26.8–34.3)
MCHC RBC AUTO-ENTMCNC: 32.9 G/DL (ref 31.4–37.4)
MCV RBC AUTO: 95 FL (ref 82–98)
PLATELET # BLD AUTO: 262 THOUSANDS/UL (ref 149–390)
PMV BLD AUTO: 9.3 FL (ref 8.9–12.7)
POTASSIUM SERPL-SCNC: 3.8 MMOL/L (ref 3.5–5.3)
RBC # BLD AUTO: 3.27 MILLION/UL (ref 3.81–5.12)
SODIUM SERPL-SCNC: 135 MMOL/L (ref 136–145)
WBC # BLD AUTO: 11.05 THOUSAND/UL (ref 4.31–10.16)

## 2020-07-08 PROCEDURE — 97116 GAIT TRAINING THERAPY: CPT

## 2020-07-08 PROCEDURE — 80048 BASIC METABOLIC PNL TOTAL CA: CPT | Performed by: PHYSICIAN ASSISTANT

## 2020-07-08 PROCEDURE — 97530 THERAPEUTIC ACTIVITIES: CPT

## 2020-07-08 PROCEDURE — 85027 COMPLETE CBC AUTOMATED: CPT | Performed by: PHYSICIAN ASSISTANT

## 2020-07-08 PROCEDURE — 97163 PT EVAL HIGH COMPLEX 45 MIN: CPT

## 2020-07-08 PROCEDURE — 97535 SELF CARE MNGMENT TRAINING: CPT

## 2020-07-08 PROCEDURE — 97110 THERAPEUTIC EXERCISES: CPT

## 2020-07-08 PROCEDURE — 99024 POSTOP FOLLOW-UP VISIT: CPT | Performed by: PHYSICIAN ASSISTANT

## 2020-07-08 PROCEDURE — 97167 OT EVAL HIGH COMPLEX 60 MIN: CPT

## 2020-07-08 RX ORDER — CALCIUM CARBONATE 200(500)MG
1000 TABLET,CHEWABLE ORAL 3 TIMES DAILY PRN
Status: DISCONTINUED | OUTPATIENT
Start: 2020-07-08 | End: 2020-07-09 | Stop reason: HOSPADM

## 2020-07-08 RX ADMIN — ONDANSETRON 4 MG: 2 INJECTION INTRAMUSCULAR; INTRAVENOUS at 18:38

## 2020-07-08 RX ADMIN — IRON SUCROSE 300 MG: 20 INJECTION, SOLUTION INTRAVENOUS at 11:47

## 2020-07-08 RX ADMIN — MORPHINE SULFATE 2 MG: 4 INJECTION INTRAVENOUS at 00:17

## 2020-07-08 RX ADMIN — OXYCODONE HYDROCHLORIDE 10 MG: 10 TABLET ORAL at 23:30

## 2020-07-08 RX ADMIN — MORPHINE SULFATE 2 MG: 4 INJECTION INTRAVENOUS at 14:27

## 2020-07-08 RX ADMIN — MORPHINE SULFATE 2 MG: 4 INJECTION INTRAVENOUS at 10:12

## 2020-07-08 RX ADMIN — OXYCODONE HYDROCHLORIDE 10 MG: 10 TABLET ORAL at 12:37

## 2020-07-08 RX ADMIN — LORATADINE 10 MG: 10 TABLET ORAL at 08:00

## 2020-07-08 RX ADMIN — ENOXAPARIN SODIUM 40 MG: 40 INJECTION SUBCUTANEOUS at 03:00

## 2020-07-08 RX ADMIN — OXYCODONE HYDROCHLORIDE 10 MG: 10 TABLET ORAL at 16:46

## 2020-07-08 RX ADMIN — CALCIUM CARBONATE (ANTACID) CHEW TAB 500 MG 1000 MG: 500 CHEW TAB at 21:27

## 2020-07-08 RX ADMIN — CALCIUM CARBONATE (ANTACID) CHEW TAB 500 MG 1000 MG: 500 CHEW TAB at 10:13

## 2020-07-08 RX ADMIN — CALCIUM CARBONATE (ANTACID) CHEW TAB 500 MG 1000 MG: 500 CHEW TAB at 01:15

## 2020-07-08 RX ADMIN — ONDANSETRON 4 MG: 2 INJECTION INTRAMUSCULAR; INTRAVENOUS at 07:18

## 2020-07-08 RX ADMIN — CALCIUM 1 TABLET: 500 TABLET ORAL at 08:00

## 2020-07-08 RX ADMIN — PANTOPRAZOLE SODIUM 40 MG: 40 TABLET, DELAYED RELEASE ORAL at 08:00

## 2020-07-08 RX ADMIN — OXYCODONE HYDROCHLORIDE 10 MG: 10 TABLET ORAL at 03:00

## 2020-07-08 RX ADMIN — CLINDAMYCIN PHOSPHATE 600 MG: 600 INJECTION, SOLUTION INTRAVENOUS at 03:39

## 2020-07-08 RX ADMIN — OXYCODONE HYDROCHLORIDE 10 MG: 10 TABLET ORAL at 08:00

## 2020-07-08 NOTE — PLAN OF CARE
Problem: PHYSICAL THERAPY ADULT  Goal: Performs mobility at highest level of function for planned discharge setting  See evaluation for individualized goals  Description  Treatment/Interventions: Functional transfer training, LE strengthening/ROM, Elevations, Gait training, Bed mobility, Equipment eval/education, Patient/family training, Endurance training, Therapeutic exercise, Spoke to nursing, OT  Equipment Recommended: Mariely Mcdonnell       See flowsheet documentation for full assessment, interventions and recommendations  7/8/2020 1345 by Collette Marshal, PT  Outcome: Progressing  Note:   Prognosis: Fair  Problem List: Decreased strength, Decreased range of motion, Decreased endurance, Impaired balance, Decreased mobility, Decreased coordination, Pain, Orthopedic restrictions, Obesity  Pt requires >50% physical assistance to perform exercises, and requires frequent verbal instruction or tactile feedback to perform exercises with proper form and technique  Barriers to Discharge: Decreased caregiver support, Inaccessible home environment     PT Discharge Recommendation: Home with skilled therapy(OPPT)          See flowsheet documentation for full assessment
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
## 2020-07-08 NOTE — SOCIAL WORK
LOS 0  Patient is not a bundle or a readmission  CM spoke with patient and  at the bedside  CM introduced self and role  Patient lives in [address] [place] [address] with her  in a multilevel home  There are 3 SHERLYN home and 14 steps to second floor  Patient's bedroom is on the second floor  Patient has a roller walker, bedside commode, raised toilet seat, cane and shower chair at home  Patient is independent with ADLS  Patient uses [facility] pharmacy for prescriptions  Patient has prescription insurance and is able to afford her medications  Patient does not have an advance directive/living will on file  Patient's  Gilberto Gray is her POA, [phone]  Patient is retired and currently drives  Patient's PCP is Dr Pauly Rushing  Patient's  will transport at discharge  CM spoke with patient and  at the bedside re:CHARMAINE  Patient updated per PT, patient did very well with therapist  PT will provide a treatment this afternoon  Patient aware she has an outpatient PT appointment at [address] [place] [address] on 7/10/2020 at 1045  Patient's  stated Outpatient PT facility is less than 2 miles from their home  Patient has Lovenox at home  Patient stated she is comfortable with self injections  Patient has given herself injections in the past  Patient and  updated stool softener and pain medication was sent to their [facility] pharmacy in [address] [place] [address]   will transport at discharge  No other needs noted at this time  CM will continue to f/u re:CHARMAINE  CM reviewed discharge planning process including the following: identifying caregivers at home, preference for d/c planning needs,   availability of [facility] program, availability of treatment team to discuss questions or concerns patient and/or family may have regarding diagnosis, plan of care, old or new medications and discharge planning   CM will continue to follow for care coordination and update assessment as appropriate

## 2020-07-08 NOTE — PROGRESS NOTES
Progress Note - Orthopedics   Don Gamez 76 y o  female MRN: [id number]  Unit/Bed#: S -01      Subjective:    76 y  o female seen and evaluated  No acute events overnight  Overall, pain is controlled  Denies fevers chills, numbness or tingling       Labs:  0   Lab Value Date/Time    HCT 31 0 (L) 07/08/2020 0457    HCT 40 6 06/02/2020 1002    HCT 39 6 12/30/2019 1034    HCT 39 0 11/24/2017 0724    HCT 39 4 11/22/2016 0715    HCT 40 4 02/20/2014 0758    HGB 10 2 (L) 07/08/2020 0457    HGB 13 1 06/02/2020 1002    HGB 12 8 12/30/2019 1034    HGB 12 6 11/24/2017 0724    HGB 13 1 11/22/2016 0715    HGB 12 8 02/20/2014 0758    INR 1 01 06/02/2020 1002    WBC 11 05 (H) 07/08/2020 0457    WBC 7 11 06/02/2020 1002    WBC 8 42 12/30/2019 1034    WBC 7 9 11/24/2017 0724    WBC 6 0 11/22/2016 0715    WBC 6 97 02/20/2014 0758    CRP <3 0 06/02/2020 1002       Meds:    Current Facility-Administered Medications:     acetaminophen (TYLENOL) tablet 650 mg, 650 mg, Oral, Q6H PRN, Shericee Citrin, PA-C    ascorbic acid (VITAMIN C) tablet 500 mg, 500 mg, Oral, BID, Anuj Ninain, PA-C    calcium carbonate (OYSTER SHELL,OSCAL) 500 mg tablet 1 tablet, 1 tablet, Oral, Daily With Breakfast, Anuj Citrin, PA-C    calcium carbonate (TUMS) chewable tablet 1,000 mg, 1,000 mg, Oral, Daily PRN, Nannie Citrin, PA-C, 1,000 mg at 07/08/20 0115    enoxaparin (LOVENOX) subcutaneous injection 40 mg, 40 mg, Subcutaneous, Daily, Kimnie Citrin, PA-C    ferrous sulfate tablet 325 mg, 325 mg, Oral, BID With Meals, Anuj Citrin, PA-C    folic acid (FOLVITE) tablet 1 mg, 1 mg, Oral, Daily, Nannie Citrin, PA-C    lactated ringers bolus 1,000 mL, 1,000 mL, Intravenous, Once PRN **AND** lactated ringers bolus 1,000 mL, 1,000 mL, Intravenous, Once PRN, Anuj Citrin, PA-C    loratadine (CLARITIN) tablet 10 mg, 10 mg, Oral, Daily, Anuj Ninain, PA-C    morphine (PF) 4 mg/mL injection 2 mg, 2 mg, Intravenous, Q2H PRN, Nanbilly Citrin, PA-C, 2 mg at 07/08/20 0017   multivitamin-minerals (CENTRUM) tablet 1 tablet, 1 tablet, Oral, Daily, Yash Yanez PA-C    ondansetron [facility]) injection 4 mg, 4 mg, Intravenous, Q6H PRN, Yash Yanez PA-C, 4 mg at 07/08/20 0718    oxyCODONE (ROXICODONE) IR tablet 10 mg, 10 mg, Oral, Q4H PRN, YAA Cotton-C, 10 mg at 07/08/20 0300    oxyCODONE (ROXICODONE) IR tablet 5 mg, 5 mg, Oral, Q4H PRN, YAA Cotton-C, 5 mg at 07/07/20 1632    pantoprazole (PROTONIX) EC tablet 40 mg, 40 mg, Oral, Daily, Yash Yanez PA-C    sodium chloride 0 9 % bolus 1,000 mL, 1,000 mL, Intravenous, Once PRN **AND** sodium chloride 0 9 % bolus 1,000 mL, 1,000 mL, Intravenous, Once PRN, Yash Yanez PA-C    sodium chloride 0 9 % infusion, 75 mL/hr, Intravenous, Continuous, Yash Yanez PA-C, Last Rate: 75 mL/hr at 07/07/20 1938, 75 mL/hr at 07/07/20 1938    Blood Culture:   No results found for: BLOODCX    Wound Culture:   No results found for: WOUNDCULT    Ins and Outs:  I/O last 24 hours: In: 2581 3 [P O :120; I V :2411 3; IV Piggyback:50]  Out: 6814 [Urine:1250; Blood:200]          Physical:  Vitals:    07/08/20 0700   BP: 122/86   Pulse: 96   Resp: 18   Temp: 98 9 °F (37 2 °C)   SpO2: 96%     Musculoskeletal: left Lower Extremity (hip)  · Skin warm and well perfused  · Dressing clean, dry, intact  · Thigh compartments soft and compressible  No excessive soft tissue swelling  · SILT s/s/sp/dp/t  · Motor intact fhl/ehl, ankle dorsi/plantar flexion  · Intact DP pulse    Assessment:    76 y  o female POD 1 s/p left total hip arthroplasty     Plan:  · WBAT LLE  · HGB 10 2 this AM  Greater than 2 gram drop which qualifies for diagnosis of acute blood loss anemia, will monitor and administer IVF/prbc as indicated   · PT/OT  · Pain control  · DVT ppx - lovenox for 30 days post-op  · Dispo: Discharge planning    Edy Buckley PA-C
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
## 2020-07-08 NOTE — PLAN OF CARE
Problem: PHYSICAL THERAPY ADULT  Goal: Performs mobility at highest level of function for planned discharge setting  See evaluation for individualized goals  Description  Treatment/Interventions: Functional transfer training, LE strengthening/ROM, Elevations, Gait training, Bed mobility, Equipment eval/education, Patient/family training, Endurance training, Therapeutic exercise, Spoke to nursing, OT  Equipment Recommended: Broderick Oshea       See flowsheet documentation for full assessment, interventions and recommendations  Outcome: Progressing  Note:   Prognosis: Fair  Problem List: Decreased strength, Decreased range of motion, Decreased endurance, Impaired balance, Decreased mobility, Decreased coordination, Pain, Orthopedic restrictions, Obesity  Assessment: Pt was found supine in bed to begin session  She was able to perform all bed mobility and xfers with mod to min Ax1 today  Pt ambulated to the bathroom using the RW, going on the commod  She did not need any A for hygine nor did she demonstrate any LOB with handwashing at the sink  Pt ambulated into the hallway with cuing needed to promote a more functional gait pattern  No LOB present with a short step length taken  She was instructed on and performed stair training this session no complaints  Pt used b/l hands on the L rail going step to step feeling comfortable doing this at home  She ambuilated back to the room where she was very fatigued with an upset stomach but doing well  She was able to recall 2/3 hip precautions at the beginning of the session and 3/3 at the end of session post education  Pt was left supine in bed with all needs met and call bell in reach  She would benefit from continued PT in order to promote safe and functional mobility     Barriers to Discharge: Decreased caregiver support, Inaccessible home environment     PT Discharge Recommendation: Home with skilled therapy(OPPT)     PT - OK to Discharge: Yes    See flowsheet documentation for full assessment
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
## 2020-07-08 NOTE — PLAN OF CARE
Problem: PAIN - ADULT  Goal: Verbalizes/displays adequate comfort level or baseline comfort level  Description  Interventions:  - Encourage patient to monitor pain and request assistance  - Assess pain using appropriate pain scale  - Administer analgesics based on type and severity of pain and evaluate response  - Implement non-pharmacological measures as appropriate and evaluate response  - Consider cultural and social influences on pain and pain management  - Notify physician/advanced practitioner if interventions unsuccessful or patient reports new pain  Outcome: Progressing     Problem: MUSCULOSKELETAL - ADULT  Goal: Maintain or return mobility to safest level of function  Description  INTERVENTIONS:  - Assess patient's ability to carry out ADLs; assess patient's baseline for ADL function and identify physical deficits which impact ability to perform ADLs (bathing, care of mouth/teeth, toileting, grooming, dressing, etc )  - Assess/evaluate cause of self-care deficits   - Assess range of motion  - Assess patient's mobility  - Assess patient's need for assistive devices and provide as appropriate  - Encourage maximum independence but intervene and supervise when necessary  - Involve family in performance of ADLs  - Assess for home care needs following discharge   - Consider OT consult to assist with ADL evaluation and planning for discharge  - Provide patient education as appropriate  Outcome: Progressing  Goal: Maintain proper alignment of affected body part  Description  INTERVENTIONS:  - Support, maintain and protect limb and body alignment  - Provide patient/ family with appropriate education  Outcome: Progressing

## 2020-07-08 NOTE — PLAN OF CARE
Problem: PHYSICAL THERAPY ADULT  Goal: Performs mobility at highest level of function for planned discharge setting  See evaluation for individualized goals  Description  Treatment/Interventions: Functional transfer training, LE strengthening/ROM, Elevations, Gait training, Bed mobility, Equipment eval/education, Patient/family training, Endurance training, Therapeutic exercise, Spoke to nursing, OT  Equipment Recommended: Amanda Baez       See flowsheet documentation for full assessment, interventions and recommendations  Note:   Prognosis: Fair  Problem List: Decreased strength, Decreased range of motion, Decreased endurance, Impaired balance, Decreased mobility, Decreased coordination, Pain, Orthopedic restrictions, Obesity  Assessment: Pt is a 76 y o  female seen for PT evaluation s/p admit to [facility] on 7/7/2020 w/above surgery, is WBAT w/post lateral hip precautions  Order placed for PT  Upon evaluation: Pt requires mod assistance for bed mobility and 2 person min assistance for transfers and ambulation with rolling walker  Pt's clinical presentation is currently unstable/unpredictable given the functional mobility deficits above, especially (but not limited to) weakness, gait deviations, pain and orthopedic restrictions, coupled with fall risks including impaired balance, and combined with medical complications of abnormal H&H and limited pain control per nursing  Pt to benefit from continued skilled PT tx while in hospital and upon DC to address deficits as defined above and maximize level of functional mobility  Recommend trial with walker next 1-2 sessions, ther ex next 1-2 sessions, OT consult and case management consult  Barriers to Discharge: Decreased caregiver support, Inaccessible home environment     PT Discharge Recommendation: Home with skilled therapy(OPPT)          See flowsheet documentation for full assessment

## 2020-07-08 NOTE — PLAN OF CARE
Problem: PAIN - ADULT  Goal: Verbalizes/displays adequate comfort level or baseline comfort level  Description  Interventions:  - Encourage patient to monitor pain and request assistance  - Assess pain using appropriate pain scale  - Administer analgesics based on type and severity of pain and evaluate response  - Implement non-pharmacological measures as appropriate and evaluate response  - Consider cultural and social influences on pain and pain management  - Notify physician/advanced practitioner if interventions unsuccessful or patient reports new pain  Outcome: Progressing     Problem: MUSCULOSKELETAL - ADULT  Goal: Maintain or return mobility to safest level of function  Description  INTERVENTIONS:  - Assess patient's ability to carry out ADLs; assess patient's baseline for ADL function and identify physical deficits which impact ability to perform ADLs (bathing, care of mouth/teeth, toileting, grooming, dressing, etc )  - Assess/evaluate cause of self-care deficits   - Assess range of motion  - Assess patient's mobility  - Assess patient's need for assistive devices and provide as appropriate  - Encourage maximum independence but intervene and supervise when necessary  - Involve family in performance of ADLs  - Assess for home care needs following discharge   - Consider OT consult to assist with ADL evaluation and planning for discharge  - Provide patient education as appropriate  Outcome: Progressing  Goal: Maintain proper alignment of affected body part  Description  INTERVENTIONS:  - Support, maintain and protect limb and body alignment  - Provide patient/ family with appropriate education  Outcome: Progressing     Problem: INFECTION - ADULT  Goal: Absence or prevention of progression during hospitalization  Description  INTERVENTIONS:  - Assess and monitor for signs and symptoms of infection  - Monitor lab/diagnostic results  - Monitor all insertion sites, i e  indwelling lines, tubes, and drains  - Monitor endotracheal if appropriate and nasal secretions for changes in amount and color  - [place] appropriate cooling/warming therapies per order  - Administer medications as ordered  - Instruct and encourage patient and family to use good hand hygiene technique  - Identify and instruct in appropriate isolation precautions for identified infection/condition  Outcome: Progressing     Problem: Prexisting or High Potential for Compromised Skin Integrity  Goal: Skin integrity is maintained or improved  Description  INTERVENTIONS:  - Identify patients at risk for skin breakdown  - Assess and monitor skin integrity  - Assess and monitor nutrition and hydration status  - Monitor labs   - Assess for incontinence   - Turn and reposition patient  - Assist with mobility/ambulation  - Relieve pressure over bony prominences  - Avoid friction and shearing  - Provide appropriate hygiene as needed including keeping skin clean and dry  - Evaluate need for skin moisturizer/barrier cream  - Collaborate with interdisciplinary team   - Patient/family teaching  - Consider wound care consult   Outcome: Progressing     Problem: Potential for Falls  Goal: Patient will remain free of falls  Description  INTERVENTIONS:  - Assess patient frequently for physical needs  -  Identify cognitive and physical deficits and behaviors that affect risk of falls    -  [place] fall precautions as indicated by assessment   - Educate patient/family on patient safety including physical limitations  - Instruct patient to call for assistance with activity based on assessment  - Modify environment to reduce risk of injury  - Consider OT/PT consult to assist with strengthening/mobility  Outcome: Progressing

## 2020-07-08 NOTE — PHYSICAL THERAPY NOTE
Physical Therapy Progress Note     20 1514   Pain Assessment   Pain Assessment Tool 0-10   Pain Score 7   Pain Location/Orientation Orientation: Left; Location: Hip   Restrictions/Precautions   Weight Bearing Precautions Per Order Yes   LLE Weight Bearing Per Order WBAT   Braces or Orthoses Other (Comment)  (abduction pillow)   Other Precautions THR;WBS;Fall Risk;Pain   General   Response to Previous Treatment Patient with no complaints from previous session  Family/Caregiver Present Yes   Cognition   Overall Cognitive Status WFL   Arousal/Participation Alert; Cooperative   Attention Attends with cues to redirect   Orientation Level Oriented X4   Memory Decreased recall of precautions   Following Commands Follows one step commands with increased time or repetition   Comments Pt was identified by name and , agreeable to treatment  Bed Mobility   Supine to Sit 3  Moderate assistance   Additional items Assist x 1;HOB elevated; Increased time required;Verbal cues;LE management   Sit to Supine 3  Moderate assistance   Additional items Assist x 1; Increased time required;Verbal cues;LE management  (Educated pts family member on bed xfers)   Transfers   Sit to Stand 4  Minimal assistance   Additional items Assist x 1; Armrests; Increased time required;Verbal cues   Stand to Sit 4  Minimal assistance   Additional items Assist x 1; Increased time required;Verbal cues   Stand pivot 4  Minimal assistance   Additional items Assist x 1; Increased time required;Verbal cues   Toilet transfer 4  Minimal assistance   Additional items Assist x 1; Increased time required;Commode;Armrests   Ambulation/Elevation   Gait pattern Antalgic;Decreased L stance; Excessively slow; Short stride; Step to   Gait Assistance 4  Minimal assist   Additional items Assist x 1;Verbal cues   Assistive Device Rolling walker   Distance 60'x2, 15'x2   Stair Management Assistance 4  Minimal assist   Additional items Assist x 1;Verbal cues; Increased time required   Stair Management Technique One rail L;Step to pattern; Foreward   Number of Stairs 14   Balance   Static Sitting Fair +   Static Standing Poor +   Ambulatory Poor   Endurance Deficit   Endurance Deficit Yes   Endurance Deficit Description limited by pain and upset stomach   Activity Tolerance   Activity Tolerance Patient limited by fatigue;Patient limited by pain   Nurse Made Aware yes, ok to see   Exercises   Quad Sets Supine;10 reps;AROM; Bilateral   Heelslides Supine;10 reps;AAROM; Left   Glute Sets Supine;10 reps;AROM; Bilateral   Ankle Pumps Supine;20 reps;AROM; Bilateral   Assessment   Prognosis Fair   Problem List Decreased strength;Decreased range of motion;Decreased endurance; Impaired balance;Decreased mobility; Decreased coordination;Pain;Orthopedic restrictions; Obesity   Assessment Pt was found supine in bed to begin session  She was able to perform all bed mobility and xfers with mod to min Ax1 today  Pt ambulated to the bathroom using the RW, going on the commod  She did not need any A for hygine nor did she demonstrate any LOB with handwashing at the sink  Pt ambulated into the hallway with cuing needed to promote a more functional gait pattern  No LOB present with a short step length taken  She was instructed on and performed stair training this session no complaints  Pt used b/l hands on the L rail going step to step feeling comfortable doing this at home  She ambuilated back to the room where she was very fatigued with an upset stomach but doing well  She was able to recall 2/3 hip precautions at the beginning of the session and 3/3 at the end of session post education  Pt was left supine in bed with all needs met and call bell in reach  She would benefit from continued PT in order to promote safe and functional mobility  Barriers to Discharge Decreased caregiver support; Inaccessible home environment   Goals   Patient Goals to learn how to get in and out of the bed     STG Expiration Date 07/15/20   Short Term Goal #1 Pt will: Perform bed mobility tasks with modified I to prepare for transfers and reposition in bed  Perform transfers with modified I to demonstrate compliance with hip precautions  Perform ambulation with LRAD for 48' with  modified I  to increase Indep in home environment and decrease risk for falls  Perform flight of stairs w/ railing and DME and w/Supervision to return to [facility] home and decrease risk for falls  Verbalize 3/3 THP's to promote pt to maintain hip precautions in function   PT Treatment Day 1   Plan   Treatment/Interventions Functional transfer training;LE strengthening/ROM; Elevations;Gait training;Bed mobility; Equipment eval/education;Patient/family training; Endurance training; Therapeutic exercise;Spoke to nursing   PT Frequency Twice a day   Recommendation   PT Discharge Recommendation Home with skilled therapy  (OPPT)   Equipment Recommended Dereje Biswas   PT - OK to Discharge Yes   Additional Comments when medically cleared     Tesha Weston, PTA
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
## 2020-07-08 NOTE — PLAN OF CARE
Problem: PAIN - ADULT  Goal: Verbalizes/displays adequate comfort level or baseline comfort level  Description  Interventions:  - Encourage patient to monitor pain and request assistance  - Assess pain using appropriate pain scale  - Administer analgesics based on type and severity of pain and evaluate response  - Implement non-pharmacological measures as appropriate and evaluate response  - Consider cultural and social influences on pain and pain management  - Notify physician/advanced practitioner if interventions unsuccessful or patient reports new pain  Outcome: Progressing     Problem: MUSCULOSKELETAL - ADULT  Goal: Maintain or return mobility to safest level of function  Description  INTERVENTIONS:  - Assess patient's ability to carry out ADLs; assess patient's baseline for ADL function and identify physical deficits which impact ability to perform ADLs (bathing, care of mouth/teeth, toileting, grooming, dressing, etc )  - Assess/evaluate cause of self-care deficits   - Assess range of motion  - Assess patient's mobility  - Assess patient's need for assistive devices and provide as appropriate  - Encourage maximum independence but intervene and supervise when necessary  - Involve family in performance of ADLs  - Assess for home care needs following discharge   - Consider OT consult to assist with ADL evaluation and planning for discharge  - Provide patient education as appropriate  Outcome: Progressing  Goal: Maintain proper alignment of affected body part  Description  INTERVENTIONS:  - Support, maintain and protect limb and body alignment  - Provide patient/ family with appropriate education  Outcome: Progressing     Problem: INFECTION - ADULT  Goal: Absence or prevention of progression during hospitalization  Description  INTERVENTIONS:  - Assess and monitor for signs and symptoms of infection  - Monitor lab/diagnostic results  - Monitor all insertion sites, i e  indwelling lines, tubes, and drains  - Monitor endotracheal if appropriate and nasal secretions for changes in amount and color  - [place] appropriate cooling/warming therapies per order  - Administer medications as ordered  - Instruct and encourage patient and family to use good hand hygiene technique  - Identify and instruct in appropriate isolation precautions for identified infection/condition  Outcome: Progressing

## 2020-07-09 ENCOUNTER — TRANSITIONAL CARE MANAGEMENT (OUTPATIENT)
Dept: FAMILY MEDICINE CLINIC | Facility: MEDICAL CENTER | Age: 65
End: 2020-07-09

## 2020-07-09 VITALS
HEIGHT: 64 IN | RESPIRATION RATE: 18 BRPM | TEMPERATURE: 99.1 F | WEIGHT: 142.64 LBS | OXYGEN SATURATION: 97 % | BODY MASS INDEX: 24.35 KG/M2 | HEART RATE: 89 BPM | SYSTOLIC BLOOD PRESSURE: 125 MMHG | DIASTOLIC BLOOD PRESSURE: 67 MMHG

## 2020-07-09 LAB
ANION GAP SERPL CALCULATED.3IONS-SCNC: 6 MMOL/L (ref 4–13)
BUN SERPL-MCNC: 9 MG/DL (ref 5–25)
CALCIUM SERPL-MCNC: 8.8 MG/DL (ref 8.3–10.1)
CHLORIDE SERPL-SCNC: 104 MMOL/L (ref 100–108)
CO2 SERPL-SCNC: 27 MMOL/L (ref 21–32)
CREAT SERPL-MCNC: 0.8 MG/DL (ref 0.6–1.3)
GFR SERPL CREATININE-BSD FRML MDRD: 77 ML/MIN/1.73SQ M
GLUCOSE SERPL-MCNC: 110 MG/DL (ref 65–140)
POTASSIUM SERPL-SCNC: 4.1 MMOL/L (ref 3.5–5.3)
SARS-COV-2 RNA SPEC QL NAA+PROBE: NOT DETECTED
SODIUM SERPL-SCNC: 137 MMOL/L (ref 136–145)

## 2020-07-09 PROCEDURE — 80048 BASIC METABOLIC PNL TOTAL CA: CPT | Performed by: PHYSICIAN ASSISTANT

## 2020-07-09 PROCEDURE — 97110 THERAPEUTIC EXERCISES: CPT

## 2020-07-09 PROCEDURE — 97116 GAIT TRAINING THERAPY: CPT

## 2020-07-09 PROCEDURE — 99024 POSTOP FOLLOW-UP VISIT: CPT | Performed by: ORTHOPAEDIC SURGERY

## 2020-07-09 RX ADMIN — MORPHINE SULFATE 2 MG: 4 INJECTION INTRAVENOUS at 01:01

## 2020-07-09 RX ADMIN — LORATADINE 10 MG: 10 TABLET ORAL at 07:52

## 2020-07-09 RX ADMIN — MORPHINE SULFATE 2 MG: 4 INJECTION INTRAVENOUS at 10:06

## 2020-07-09 RX ADMIN — ENOXAPARIN SODIUM 40 MG: 40 INJECTION SUBCUTANEOUS at 07:52

## 2020-07-09 RX ADMIN — PANTOPRAZOLE SODIUM 40 MG: 40 TABLET, DELAYED RELEASE ORAL at 07:53

## 2020-07-09 RX ADMIN — OXYCODONE HYDROCHLORIDE 5 MG: 5 TABLET ORAL at 07:50

## 2020-07-09 RX ADMIN — CALCIUM 1 TABLET: 500 TABLET ORAL at 07:50

## 2020-07-09 NOTE — PROGRESS NOTES
Orthopedics   Gonzales Gardiner 76 y o  female MRN: [id number]  Unit/Bed#: S -01      Subjective:  76 y  o female post operative day 2 left total hip arthroplasty  Pt doing well  Pain controlled      Labs:  0   Lab Value Date/Time    HCT 31 0 (L) 07/08/2020 0457    HCT 40 6 06/02/2020 1002    HCT 39 6 12/30/2019 1034    HCT 39 0 11/24/2017 0724    HCT 39 4 11/22/2016 0715    HCT 40 4 02/20/2014 0758    HGB 10 2 (L) 07/08/2020 0457    HGB 13 1 06/02/2020 1002    HGB 12 8 12/30/2019 1034    HGB 12 6 11/24/2017 0724    HGB 13 1 11/22/2016 0715    HGB 12 8 02/20/2014 0758    INR 1 01 06/02/2020 1002    WBC 11 05 (H) 07/08/2020 0457    WBC 7 11 06/02/2020 1002    WBC 8 42 12/30/2019 1034    WBC 7 9 11/24/2017 0724    WBC 6 0 11/22/2016 0715    WBC 6 97 02/20/2014 0758    CRP <3 0 06/02/2020 1002       Meds:    Current Facility-Administered Medications:     acetaminophen (TYLENOL) tablet 650 mg, 650 mg, Oral, Q6H PRN, Sherry Edge PA-C    ascorbic acid (VITAMIN C) tablet 500 mg, 500 mg, Oral, BID, Sherry Edge PA-C    calcium carbonate (OYSTER SHELL,OSCAL) 500 mg tablet 1 tablet, 1 tablet, Oral, Daily With Breakfast, Sherry Edge PA-C, 1 tablet at 07/08/20 0800    calcium carbonate (TUMS) chewable tablet 1,000 mg, 1,000 mg, Oral, TID PRN, YAA Patiño-MARCIAL, 1,000 mg at 07/08/20 2127    enoxaparin (LOVENOX) subcutaneous injection 40 mg, 40 mg, Subcutaneous, Daily, Sherry Edge PA-C    ferrous sulfate tablet 325 mg, 325 mg, Oral, BID With Meals, Sherry Edge PA-C    folic acid (FOLVITE) tablet 1 mg, 1 mg, Oral, Daily, Sherry Edge PA-C    loratadine (CLARITIN) tablet 10 mg, 10 mg, Oral, Daily, Sherry Edge PA-C, 10 mg at 07/08/20 0800    morphine (PF) 4 mg/mL injection 2 mg, 2 mg, Intravenous, Q2H PRN, Sherry Edge PA-C, 2 mg at 07/09/20 0101    multivitamin-minerals (CENTRUM) tablet 1 tablet, 1 tablet, Oral, Daily, Sherry Edge PA-C    ondansetron [facility]) injection 4 mg, 4 mg, Intravenous, Q6H PRN, Tena Tijerina Kana PA-C, 4 mg at 07/08/20 7727    oxyCODONE (ROXICODONE) IR tablet 10 mg, 10 mg, Oral, Q4H PRN, Alberto Pedro, PA-C, 10 mg at 07/08/20 2330    oxyCODONE (ROXICODONE) IR tablet 5 mg, 5 mg, Oral, Q4H PRN, Alberto Pedro, PA-C, 5 mg at 07/07/20 1632    pantoprazole (PROTONIX) EC tablet 40 mg, 40 mg, Oral, Daily, Alberto Pedro, PA-C, 40 mg at 07/08/20 0800    Blood Culture:   No results found for: BLOODCX    Wound Culture:   No results found for: WOUNDCULT    Ins and Outs:  I/O last 24 hours: In: 48 [P O :50]  Out: 1300 [Urine:1300]          Physical Exam:  Vitals:    07/09/20 0714   BP: 125/67   Pulse: 89   Resp: 18   Temp: 99 1 °F (37 3 °C)   SpO2: 97%     left lower extremity:  · Dressings C/D/I  · Sensation intact L2-S1  · Motor intact L2-S1      _*_*_*_*_*_*_*_*_*_*_*_*_*_*_*_*_*_*_*_*_*_*_*_*_*_*_*_*_*_*_*_*_*_*_*_*_*_*_*_*_*    Assessment: 76 y  o female post operative day 2 left total hip arthroplasty   Doing well    Plan:  · Weight Bearing as tolerated  · Up and out of bed  · Posterior total hip precautions  · Abduction pillow while in bed  · DVT prophylaxis  · Analgesics  · PT/OT  · Patient noted to have acute blood loss anemia due to a drop in Hbg of > 2 0g from preop levels, will monitor vital signs and resuscitate with IV fluids as needed receiving [address]  Vincent [place], DO

## 2020-07-09 NOTE — PLAN OF CARE
Problem: PAIN - ADULT  Goal: Verbalizes/displays adequate comfort level or baseline comfort level  Description  Interventions:  - Encourage patient to monitor pain and request assistance  - Assess pain using appropriate pain scale  - Administer analgesics based on type and severity of pain and evaluate response  - Implement non-pharmacological measures as appropriate and evaluate response  - Consider cultural and social influences on pain and pain management  - Notify physician/advanced practitioner if interventions unsuccessful or patient reports new pain  Outcome: Adequate for Discharge     Problem: MUSCULOSKELETAL - ADULT  Goal: Maintain or return mobility to safest level of function  Description  INTERVENTIONS:  - Assess patient's ability to carry out ADLs; assess patient's baseline for ADL function and identify physical deficits which impact ability to perform ADLs (bathing, care of mouth/teeth, toileting, grooming, dressing, etc )  - Assess/evaluate cause of self-care deficits   - Assess range of motion  - Assess patient's mobility  - Assess patient's need for assistive devices and provide as appropriate  - Encourage maximum independence but intervene and supervise when necessary  - Involve family in performance of ADLs  - Assess for home care needs following discharge   - Consider OT consult to assist with ADL evaluation and planning for discharge  - Provide patient education as appropriate  Outcome: Adequate for Discharge  Goal: Maintain proper alignment of affected body part  Description  INTERVENTIONS:  - Support, maintain and protect limb and body alignment  - Provide patient/ family with appropriate education  Outcome: Adequate for Discharge     Problem: INFECTION - ADULT  Goal: Absence or prevention of progression during hospitalization  Description  INTERVENTIONS:  - Assess and monitor for signs and symptoms of infection  - Monitor lab/diagnostic results  - Monitor all insertion sites, i e  indwelling lines, tubes, and drains  - Monitor endotracheal if appropriate and nasal secretions for changes in amount and color  - [place] appropriate cooling/warming therapies per order  - Administer medications as ordered  - Instruct and encourage patient and family to use good hand hygiene technique  - Identify and instruct in appropriate isolation precautions for identified infection/condition  Outcome: Adequate for Discharge     Problem: Prexisting or High Potential for Compromised Skin Integrity  Goal: Skin integrity is maintained or improved  Description  INTERVENTIONS:  - Identify patients at risk for skin breakdown  - Assess and monitor skin integrity  - Assess and monitor nutrition and hydration status  - Monitor labs   - Assess for incontinence   - Turn and reposition patient  - Assist with mobility/ambulation  - Relieve pressure over bony prominences  - Avoid friction and shearing  - Provide appropriate hygiene as needed including keeping skin clean and dry  - Evaluate need for skin moisturizer/barrier cream  - Collaborate with interdisciplinary team   - Patient/family teaching  - Consider wound care consult   Outcome: Adequate for Discharge     Problem: Potential for Falls  Goal: Patient will remain free of falls  Description  INTERVENTIONS:  - Assess patient frequently for physical needs  -  Identify cognitive and physical deficits and behaviors that affect risk of falls    -  Agency fall precautions as indicated by assessment   - Educate patient/family on patient safety including physical limitations  - Instruct patient to call for assistance with activity based on assessment  - Modify environment to reduce risk of injury  - Consider OT/PT consult to assist with strengthening/mobility  Outcome: Adequate for Discharge

## 2020-07-09 NOTE — PHYSICAL THERAPY NOTE
PHYSICAL THERAPY TREATMENT NOTE  NAME:  Jagdish Beach  DATE: 07/09/20    Length Of Stay: 1  Performed at least 2 patient identifiers during session: Name and Birthday    TREATMENT:    07/09/20 0839   Pain Assessment   Pain Assessment Tool 0-10   Pain Score 6   Pain Location/Orientation Orientation: Left; Location: Hip   Pain Onset/Description Descriptor: Sharp   Effect of Pain on Daily Activities Limits pts pace of mobility    Patient's Stated Pain Goal No pain   Hospital Pain Intervention(s) Repositioned; Ambulation/increased activity; Emotional support;Medication (See MAR)  (Medication give by Kenny Mars RN)   Precautions   Total Hip Replacement ADduction; Internal rotation; Flexion   Restrictions/Precautions   Weight Bearing Precautions Per Order Yes   LLE Weight Bearing Per Order WBAT   Other Precautions THR;WBS;Pain; Fall Risk   General   Chart Reviewed Yes   Additional Pertinent History L THR 7/7/2020, WBAT   Response to Previous Treatment Patient with no complaints from previous session     Family/Caregiver Present No   Cognition   Overall Cognitive Status WFL   Arousal/Participation Alert   Orientation Level Oriented X4   Memory Within functional limits  (Able to recite 3/3 hip precautions)   Following Commands Follows one step commands with increased time or repetition   Subjective   Subjective Pt reports "I understand how to perform the steps and Blaine Scottman is good to help me as needed w/ the steps", Pt stated "good understanding of car transfer/demonstration, HEP and 3/3 hip precautions", pt "denies she has any more questions for us at this time" at end of session   Bed Mobility   Supine to Sit Unable to assess  (Due to pt OOB at start of session)   Sit to Supine 3  Moderate assistance   Additional items Assist x 1;LE management  (Pt instructed on various techniques to help LE into bed)   Additional Comments Pt reports that she understands how to perform bed mobility and reports that her  and daughter will be there to help her  Transfers   Sit to Stand 4  Minimal assistance   Additional items Assist x 1; Armrests; Increased time required  (For balance )   Stand to Sit 5  Supervision   Additional items Increased time required;Armrests  (Pt able to verbalize correct technique)   Car transfer   (See below for instructions )   Additional Comments FOR CAR TRANSFER: Verbal/visual demonstration of car transfer  Pt was instructed on how to perform and what she needed  Pt verbalized good understanding of car transfers and stated "that she understood how to perfom and had no further questions"   Ambulation/Elevation   Gait pattern Antalgic; Wide ARVIN; Decreased L stance  (R shoulder hiking, rounded shoulders)   Gait Assistance 5  Supervision   Additional items   (Verbal instructions for walker management )   Assistive Device Rolling walker   Distance 100'   Stair Management Assistance Not tested  (Pt verbalized good understanding on how to perform)   Additional items   (Pt will have a family member w/ her when performing)   Balance   Static Sitting Fair +   Static Standing Fair -   Endurance Deficit   Endurance Deficit Yes   Endurance Deficit Description Pt limited by ambulation distances and requires therapeutic rest break in order to perform   Activity Tolerance   Activity Tolerance Patient limited by pain; Patient limited by Jennifer Willacy Dr Carr Sat, DO about pt performance    Nurse Enedelia coordination w/ Sherry, RN & Юлия Jefferson RN prior to sessing pt   Exercises   [employer] Sitting;15 reps;AROM; Left  (@ reclincer w/ legs elevated, cues for pacing)   Glute Sets Sitting;15 reps;AROM; Left   Hip Abduction Sitting;15 reps;AAROM; Left  (Verbal instructions for technique)   Hip Adduction Sitting;15 reps;AAROM; Left  (Verbal instructions for technique)   Ankle Pumps Sitting;20 reps;AROM; Bilateral  (Cues for pacing )   Assessment   Prognosis Excellent   Problem List Decreased strength;Decreased range of motion;Decreased endurance; Impaired balance;Decreased mobility; Decreased coordination;Pain;Orthopedic restrictions   Assessment  Pt demonstrated good progress towards her mobility goals  Pt was able to perform transfers and ambulation w/ RW w/ less assistance as compared to last treatment session  Pt was able to recall/recite 3/3 hip precautions  Pt continues to require assistance for LE management, especially w/ bed mobility  However, pt reports her  and daughter are planning to help  Pt was also instructed on various techniques in how to perform LE management for bed mobility  Pt verbalized good understanding for stair management, HEP, THR precautions and car transfer  Pt denied any further questions at this time  Pt is adequate for discharge at this time, pending social support at home  Pt would benefit from continued therapy if not discharged home to improve functional mobility  Recommend gait and stair trials and LE ROM/strengthening  Recommend for nursing to use Ax1 for OOB mobility  Barriers to Discharge Inaccessible home environment   Goals   Patient Goals "To be able to go home to my family   STG Expiration Date 07/15/20   PT Treatment Day 2   Plan   Treatment/Interventions Functional transfer training;LE strengthening/ROM; Elevations; Therapeutic exercise; Endurance training;Cognitive reorientation;Patient/family training;Equipment eval/education; Bed mobility;Gait training;Spoke to nursing;Family   Progress Progressing toward goals   PT Frequency Twice a day   Recommendation   PT Discharge Recommendation Home with skilled therapy  (OPPT)   [address] [facility], [facility]

## 2020-07-10 ENCOUNTER — OFFICE VISIT (OUTPATIENT)
Dept: PHYSICAL THERAPY | Facility: MEDICAL CENTER | Age: 65
End: 2020-07-10
Payer: MEDICARE

## 2020-07-10 ENCOUNTER — TELEPHONE (OUTPATIENT)
Dept: OBGYN CLINIC | Facility: HOSPITAL | Age: 65
End: 2020-07-10

## 2020-07-10 DIAGNOSIS — M16.12 UNILATERAL OSTEOARTHRITIS OF HIP, LEFT: Primary | ICD-10-CM

## 2020-07-10 DIAGNOSIS — Z96.642 STATUS POST LEFT HIP REPLACEMENT: ICD-10-CM

## 2020-07-10 PROCEDURE — 97164 PT RE-EVAL EST PLAN CARE: CPT | Performed by: PHYSICAL THERAPIST

## 2020-07-10 PROCEDURE — 97110 THERAPEUTIC EXERCISES: CPT | Performed by: PHYSICAL THERAPIST

## 2020-07-10 RX ORDER — CLINDAMYCIN HYDROCHLORIDE 300 MG/1
CAPSULE ORAL
COMMUNITY
Start: 2020-06-24

## 2020-07-10 NOTE — TELEPHONE ENCOUNTER
Patient reporting she is doing well today  She reports she is due for pain medications of her pain is currently 8/10  She reports that is otherwise usually controlled  She is currently taking oxycodone 5 mg every 4 hours and Tylenol 1000 mg 3 times daily  She was advised that maximum daily dose for Tylenol is 3000 mg  She denies any icing and reports moderate swelling of the surgical area, denies redness to the leg or calf pain  I advised patient to start icing with barrier between skin and ice about 30 minutes on time, multiple times per day  Patient confirm she is elevating multiple times per day  Patient confirms she is wearing her JAN stockings and doing daily Lovenox injection without any issues or bleeding  AVS, AVS med list and F/Us reviewed with pt  Patient encouraged to review her AVS and familiarize herself with the instructions  Patient confirms she is taking her medications as listed on her AVS and denies any questions about this  She is aware of her follow-up appointments and denies barriers to attending  She reports therapy is going well  Patient denies having any concerning symptoms to her  She denies any  CP/SOB/dizziness/fevers/calf pain/nausea  Patient is currently pleased with her recovery and denies having any questions or concerns at this time  Pt encouraged to call me with any questions, concerns or issues

## 2020-07-10 NOTE — PROGRESS NOTES
PT Re-Evaluation     Today's date: 7/10/2020  Patient name: Jagdish Beach  : 1955  MRN: [id number]  Referring provider: Celeste Motta  Dx:   Encounter Diagnosis     ICD-10-CM    1  Unilateral osteoarthritis of hip, left M16 12                   Assessment  Assessment details: Jagdish Beach is a 76 y o  female who presents with Unilateral osteoarthritis of hip, left  (primary encounter diagnosis)  Status post left hip replacement  Patient presents alert and oriented with the above impairments  Reginald Jay will benefit from PT to addres deficits in order to maximize and return to prior level of function  No further referral appears necessary at this time based upon examination results  Prognosis is good given HEP compliance  Please contact me if you have any questions or recommendations  Impairments: abnormal gait, abnormal or restricted ROM, abnormal movement, activity intolerance, impaired physical strength, lacks appropriate home exercise program, pain with function and weight-bearing intolerance  Understanding of Dx/Px/POC: good   Prognosis: good    Goals  Short Term Goals:   1  Pain decreased 2 ratings in 6 weeks  2  ROM increased 10* in 6 weeks  3  Strength increased 1/2 grade in 6 weeks    Long Term Goals:   1  ADLS/IADLS in related activities improved to maximal level in 12 weeks  2  Ambulation is improved to maximal level in 12 weeks  3  [place] with HEP in 12 weeks      Plan  Patient would benefit from: skilled physical therapy  Planned modality interventions: cryotherapy  Planned therapy interventions: therapeutic exercise, stretching, strengthening, patient education, neuromuscular re-education, manual therapy, functional ROM exercises, flexibility, gait training and home exercise program  Frequency: 2x week  Duration in weeks: 12  Treatment plan discussed with: patient        Subjective Evaluation    History of Present Illness  Mechanism of injury: Pt underwent L THR on July 7   She was hospitalized for 2 nights and d/c home yesterday ambulating w/ wheeled walker  She did have PT inpatient and was provided w/ HEP that consists of quad sets, gluteal sets, ankle pumps  She is using Tylenol and Oxycodone for pain control  She presents today w/ reports of constant pain in L buttock, lateral and anterior hip  Pain is increased w/ sitting, sit to stand transfers, car transfers, bed transfers  She c/o ongoing sleep disturbance  She has been able to negotiate stairs non-reciprocally w/ use of hand rail  She feels her best walking around  She does require assistance for dressing  Very limited at this time w/ household  Pain  At best pain rating: 3  At worst pain rating: 10    Patient Goals  Patient goals for therapy: decreased pain, increased motion, increased strength and independence with ADLs/IADLs          Objective     Active Range of Motion   Left Hip   Flexion: 40 degrees   Extension: -10 degrees   Abduction: 10 degrees     Passive Range of Motion   Left Hip   Flexion: 60 degrees   Extension: -5 degrees   Abduction: 13 degrees     Strength/Myotome Testing     Left Hip   Planes of Motion   Flexion: 2+  Abduction: 2-    Left Knee   Extension: 3-    Ambulation     Comments   Ambulation w/ wheeled walker reveals forward trunk flexion, B IR, and vaulting               Precautions: post hip precautions      Manuals 7/10            PROM L hip nv                                                   Neuro Re-Ed 71/0            clamshells nv            bridges nv                                                                             Ther Ex 7/10            Adductor squeezes 5"x10            LAQ 5"x10            Heel slides nv            Seated marching nv                                                                Ther Activity                                       Gait Training                                       Modalities 7/10            CP post nv

## 2020-07-11 ENCOUNTER — TELEPHONE (OUTPATIENT)
Dept: OTHER | Facility: OTHER | Age: 65
End: 2020-07-11

## 2020-07-11 NOTE — TELEPHONE ENCOUNTER
PT: Ana Douglas/BREA 1955/[phone]/ Daughter Fred Carpenter is requesting a call back  Stated that patient has increase redness at the incision site

## 2020-07-12 ENCOUNTER — TELEPHONE (OUTPATIENT)
Dept: OBGYN CLINIC | Facility: MEDICAL CENTER | Age: 65
End: 2020-07-12

## 2020-07-12 NOTE — TELEPHONE ENCOUNTER
I spoke with Leigh Ann Dang, Ms Douglas's daughter on 7/11  Her mother is recently s/p L JANET on 7/7  She noticed some erythema adjacent to the anterior aspect of her incision that extended anteriorly to her thigh for about 6 inches  She states that it was mildly red, no redness on the other side of her incision  Denies wound drainage, increase in pain L hip, fever or chills  I checked in with her again today  Her symptoms are unchanged    They are going to monitor and have a scheduled follow up tomorrow to see Dr Bimal Crenshaw

## 2020-07-13 ENCOUNTER — OFFICE VISIT (OUTPATIENT)
Dept: OBGYN CLINIC | Facility: CLINIC | Age: 65
End: 2020-07-13

## 2020-07-13 VITALS
WEIGHT: 142 LBS | HEIGHT: 64 IN | DIASTOLIC BLOOD PRESSURE: 84 MMHG | BODY MASS INDEX: 24.24 KG/M2 | HEART RATE: 103 BPM | SYSTOLIC BLOOD PRESSURE: 132 MMHG

## 2020-07-13 DIAGNOSIS — Z96.642 STATUS POST LEFT HIP REPLACEMENT: Primary | ICD-10-CM

## 2020-07-13 DIAGNOSIS — M16.12 UNILATERAL OSTEOARTHRITIS OF HIP, LEFT: ICD-10-CM

## 2020-07-13 PROCEDURE — 1111F DSCHRG MED/CURRENT MED MERGE: CPT | Performed by: ORTHOPAEDIC SURGERY

## 2020-07-13 PROCEDURE — 4040F PNEUMOC VAC/ADMIN/RCVD: CPT | Performed by: ORTHOPAEDIC SURGERY

## 2020-07-13 PROCEDURE — 3008F BODY MASS INDEX DOCD: CPT | Performed by: ORTHOPAEDIC SURGERY

## 2020-07-13 PROCEDURE — 99024 POSTOP FOLLOW-UP VISIT: CPT | Performed by: ORTHOPAEDIC SURGERY

## 2020-07-13 PROCEDURE — 1160F RVW MEDS BY RX/DR IN RCRD: CPT | Performed by: ORTHOPAEDIC SURGERY

## 2020-07-13 RX ORDER — OXYCODONE HYDROCHLORIDE 5 MG/1
5 TABLET ORAL EVERY 4 HOURS PRN
Qty: 30 TABLET | Refills: 0 | Status: SHIPPED | OUTPATIENT
Start: 2020-07-13 | End: 2020-07-20 | Stop reason: SDUPTHER

## 2020-07-13 NOTE — PROGRESS NOTES
Assessment:   S/p L total hip arthroplasty on 7/7/20    Plan:   [facility] LLE  PT  Lovenox for 30 days post op  Oxycodone refill provided  No concern for wound infection today on exam, continue to monitor for changes they were advised to call with concerns  Posterior hip precautions  Abduction pillow  While in bed   Dandy stockings bilaterally    Follow Up:  1  week(s)    To Do Next Visit:  X-rays of the  left  hip      CHIEF COMPLAINT:  Chief Complaint   Patient presents with    Left Hip - Post-op         SUBJECTIVE:  Apryl Zelaya is a 76y o  year old female who presents for follow up after  Left total hip arthroplasty 1 week ago  Today patient has  Mild intermittent hip pain which is well controlled on current pain medication  She does not note excessive swelling in the legs  She uses the Dandy stockings as instructed  She was told by family member that there was little bit of increased redness around the incision yesterday and day before for  She denies any fevers or chills  Denies any increased drainage from the hip  Yasir Avelar        PHYSICAL EXAMINATION:    MUSCULOSKELETAL EXAMINATION:  Incision:   Incision is healing appropriately wound edges are well approximated prineo  dressing is intact there is no visible erythema surrounding the wound edges  no active drainage  Range of Motion: As expected  Neurovascular status: Neuro intact, skin warm and well perfused         STUDIES REVIEWED:  No Studies to review      PROCEDURES PERFORMED:  Procedures  No Procedures performed today

## 2020-07-14 ENCOUNTER — OFFICE VISIT (OUTPATIENT)
Dept: PHYSICAL THERAPY | Facility: MEDICAL CENTER | Age: 65
End: 2020-07-14
Payer: MEDICARE

## 2020-07-14 DIAGNOSIS — M16.12 UNILATERAL OSTEOARTHRITIS OF HIP, LEFT: Primary | ICD-10-CM

## 2020-07-14 DIAGNOSIS — Z96.642 STATUS POST LEFT HIP REPLACEMENT: ICD-10-CM

## 2020-07-14 PROCEDURE — 97110 THERAPEUTIC EXERCISES: CPT | Performed by: PHYSICAL THERAPIST

## 2020-07-14 PROCEDURE — 97140 MANUAL THERAPY 1/> REGIONS: CPT | Performed by: PHYSICAL THERAPIST

## 2020-07-14 PROCEDURE — 97112 NEUROMUSCULAR REEDUCATION: CPT | Performed by: PHYSICAL THERAPIST

## 2020-07-14 RX ORDER — LANOLIN ALCOHOL/MO/W.PET/CERES
400 CREAM (GRAM) TOPICAL DAILY
Qty: 30 TABLET | Refills: 0 | Status: SHIPPED | OUTPATIENT
Start: 2020-07-14 | End: 2020-11-04 | Stop reason: ALTCHOICE

## 2020-07-14 RX ORDER — FERROUS SULFATE TAB EC 324 MG (65 MG FE EQUIVALENT) 324 (65 FE) MG
324 TABLET DELAYED RESPONSE ORAL
Qty: 30 TABLET | Refills: 0 | Status: SHIPPED | OUTPATIENT
Start: 2020-07-14 | End: 2020-11-04 | Stop reason: ALTCHOICE

## 2020-07-14 RX ORDER — SIMETHICONE 40MG/0.6ML
SUSPENSION, DROPS(FINAL DOSAGE FORM)(ML) ORAL
Qty: 30 TABLET | Refills: 0 | Status: SHIPPED | OUTPATIENT
Start: 2020-07-14 | End: 2020-11-04 | Stop reason: ALTCHOICE

## 2020-07-14 RX ORDER — LORATADINE 10 MG
TABLET ORAL
Qty: 30 TABLET | Refills: 0 | Status: SHIPPED | OUTPATIENT
Start: 2020-07-14 | End: 2020-11-04 | Stop reason: ALTCHOICE

## 2020-07-14 NOTE — PROGRESS NOTES
Daily Note     Today's date: 2020  Patient name: Saqib Swartz  : 1955  MRN: [id number]  Referring provider: Kathleen Quach  Dx:   Encounter Diagnosis     ICD-10-CM    1  Unilateral osteoarthritis of hip, left M16 12    2  Status post left hip replacement Z96 642                   Subjective: Pt reports feeling much better today; feels she no longer needs the walker  C/o difficulty sleeping due to being uncomfortable on her back  Objective: See treatment diary below  Precautions: post hip precautions      Manuals 7/10 7/14           PROM L hip flex, ext, abd nv 10 min                                                  Neuro Re-Ed 71/0            clamshells nv 5"x10           bridges nv 5"x20           Hip abd w/ tband  GTB 5"x20           SAQ  5"x20                                     amb w/ SPC  5 min           Ther Ex 7/10 7/14           Recumbent bike  5 min           LAQ 5"x10 5"x20           Adductor squeezes 5"x10 5"x20           Seated marching nv x20           Heel slides nv 10" x10                                                  Ther Activity                                       Gait Training                                       Modalities 7/10 7/14           CP post nv np                                Assessment: Tolerated treatment well  Patient demonstrated fatigue post treatment, exhibited good technique with therapeutic exercises and would benefit from continued PT   Ambulation w/ SPC reveals no significant gait deviations  Will begin using SPC at home  No increased pain reported throughout treatment  Only reports stretching w/ PROM  Soreness only reported post treatment; deferred ice; will use at home      Plan: Continue per plan of care

## 2020-07-16 ENCOUNTER — OFFICE VISIT (OUTPATIENT)
Dept: PHYSICAL THERAPY | Facility: MEDICAL CENTER | Age: 65
End: 2020-07-16
Payer: MEDICARE

## 2020-07-16 DIAGNOSIS — M16.12 UNILATERAL OSTEOARTHRITIS OF HIP, LEFT: Primary | ICD-10-CM

## 2020-07-16 DIAGNOSIS — Z96.642 STATUS POST LEFT HIP REPLACEMENT: ICD-10-CM

## 2020-07-16 PROCEDURE — 97112 NEUROMUSCULAR REEDUCATION: CPT | Performed by: PHYSICAL THERAPIST

## 2020-07-16 PROCEDURE — 97110 THERAPEUTIC EXERCISES: CPT | Performed by: PHYSICAL THERAPIST

## 2020-07-16 PROCEDURE — 97140 MANUAL THERAPY 1/> REGIONS: CPT | Performed by: PHYSICAL THERAPIST

## 2020-07-16 NOTE — PROGRESS NOTES
Daily Note     Today's date: 2020  Patient name: Briana De Paz  : 1955  MRN: [id number]  Referring provider: Sherie Potter  Dx:   Encounter Diagnosis     ICD-10-CM    1  Unilateral osteoarthritis of hip, left M16 12    2  Status post left hip replacement Z96 642                   Subjective: Pt reports soreness following last visit  Doing very well ambulating w/ SPC  Objective: See treatment diary below  Precautions: post hip precautions      Manuals 7/10 7/14 7/16          PROM L hip flex, ext, abd nv 10 min 10 min                                                 Neuro Re-Ed 71/0           clamshells nv 5"x10 5"x10          bridges nv 5"x20 5"x20          Hip abd w/ tband  GTB 5"x20 GTB 5"x20          SAQ  5"x20 5"x20          Mini squat   x20                       amb w/ SPC  5 min           Ther Ex 7/10 7/14 7/16          Recumbent bike  5 min 7 min          LAQ 5"x10 5"x20 5"x20          Adductor squeezes 5"x10 5"x20 5"x20          Seated marching nv x20 x20          Heel slides nv 10" x10 10" x10          HR   x20                                    Ther Activity                                       Gait Training                                       Modalities 7/10 7/14 7/16          CP post nv np 10 min                               Assessment: Tolerated treatment well  Patient demonstrated fatigue post treatment, exhibited good technique with therapeutic exercises and would benefit from continued PT   PROM improved today in all planes  Added HR and mini squats w/ very good tolerance  Plan: Continue per plan of care

## 2020-07-20 ENCOUNTER — APPOINTMENT (OUTPATIENT)
Dept: RADIOLOGY | Facility: AMBULARY SURGERY CENTER | Age: 65
End: 2020-07-20
Attending: ORTHOPAEDIC SURGERY
Payer: MEDICARE

## 2020-07-20 ENCOUNTER — OFFICE VISIT (OUTPATIENT)
Dept: OBGYN CLINIC | Facility: CLINIC | Age: 65
End: 2020-07-20

## 2020-07-20 VITALS
WEIGHT: 142 LBS | HEART RATE: 103 BPM | BODY MASS INDEX: 24.24 KG/M2 | DIASTOLIC BLOOD PRESSURE: 66 MMHG | SYSTOLIC BLOOD PRESSURE: 119 MMHG | HEIGHT: 64 IN

## 2020-07-20 DIAGNOSIS — Z96.642 STATUS POST LEFT HIP REPLACEMENT: Primary | ICD-10-CM

## 2020-07-20 DIAGNOSIS — Z96.642 STATUS POST LEFT HIP REPLACEMENT: ICD-10-CM

## 2020-07-20 PROCEDURE — 4040F PNEUMOC VAC/ADMIN/RCVD: CPT | Performed by: ORTHOPAEDIC SURGERY

## 2020-07-20 PROCEDURE — 1111F DSCHRG MED/CURRENT MED MERGE: CPT | Performed by: ORTHOPAEDIC SURGERY

## 2020-07-20 PROCEDURE — 73502 X-RAY EXAM HIP UNI 2-3 VIEWS: CPT

## 2020-07-20 PROCEDURE — 3008F BODY MASS INDEX DOCD: CPT | Performed by: ORTHOPAEDIC SURGERY

## 2020-07-20 PROCEDURE — 1160F RVW MEDS BY RX/DR IN RCRD: CPT | Performed by: ORTHOPAEDIC SURGERY

## 2020-07-20 PROCEDURE — 99024 POSTOP FOLLOW-UP VISIT: CPT | Performed by: ORTHOPAEDIC SURGERY

## 2020-07-20 RX ORDER — OXYCODONE HYDROCHLORIDE 5 MG/1
5 TABLET ORAL EVERY 4 HOURS PRN
Qty: 30 TABLET | Refills: 0 | Status: SHIPPED | OUTPATIENT
Start: 2020-07-20 | End: 2020-07-27 | Stop reason: SDUPTHER

## 2020-07-20 NOTE — PROGRESS NOTES
Assessment:   Status post left total hip arthroplasty on 07/07/2020    Plan:     Weight-bearing as tolerated   posterior hip precautions   abduction pillow while in bed   analgesics p r n  Oxycodone refill prescription was given   dental prophylaxis antibiotics will be discussed at next visit   continue with Lovenox for total of 30 days, after finished start ASA 81 mg bid   Dandy stockings bilateral legs thigh-high   continue PT    Follow Up:  4  week(s)      CHIEF COMPLAINT:  Chief Complaint   Patient presents with    Left Hip - Post-op         SUBJECTIVE:  Roberta Conde is a 76y o  year old female who presents for follow up after  Left total hip arthroplasty  Today patient has Pain  Moderate  Intermittant  Dull  Pain overall is well controlled with Tylenol oxycodone  She has been trying take slightly less oxycodone the past week  She denies any fevers chills  No numbness or tingling        PHYSICAL EXAMINATION:    MUSCULOSKELETAL EXAMINATION:  Incision:  incision is healing well wound edges are well approximated there is no erythema there is no excessive soft tissue swelling no ecchymosis no fluctuance  Range of Motion: As expected  Neurovascular status: Neuro intact and  skin warm and well perfused  Prineo dressing removed      STUDIES REVIEWED:   x-ray of left hip demonstrate stable intact hardware status post total hip arthroplasty no evidence of hardware complication or failure      PROCEDURES PERFORMED:  Procedures  No Procedures performed today

## 2020-07-21 ENCOUNTER — OFFICE VISIT (OUTPATIENT)
Dept: PHYSICAL THERAPY | Facility: MEDICAL CENTER | Age: 65
End: 2020-07-21
Payer: MEDICARE

## 2020-07-21 DIAGNOSIS — Z96.642 STATUS POST LEFT HIP REPLACEMENT: ICD-10-CM

## 2020-07-21 DIAGNOSIS — M16.12 UNILATERAL OSTEOARTHRITIS OF HIP, LEFT: Primary | ICD-10-CM

## 2020-07-21 PROCEDURE — 97110 THERAPEUTIC EXERCISES: CPT | Performed by: PHYSICAL THERAPIST

## 2020-07-21 PROCEDURE — 97112 NEUROMUSCULAR REEDUCATION: CPT | Performed by: PHYSICAL THERAPIST

## 2020-07-21 PROCEDURE — 97140 MANUAL THERAPY 1/> REGIONS: CPT | Performed by: PHYSICAL THERAPIST

## 2020-07-21 NOTE — PROGRESS NOTES
Daily Note     Today's date: 2020  Patient name: Jane Sousa  : 1955  MRN: [id number]  Referring provider: Lucy Salazar  Dx:   Encounter Diagnosis     ICD-10-CM    1  Unilateral osteoarthritis of hip, left M16 12    2  Status post left hip replacement Z96 642                   Subjective: Pt had f/u w/ MD and reports he was very pleased w/ progress  Most pain occurs when pain medication wears off which she is taking about every 6 hours  She has the most difficulty at night due to trying to sleep on her back  Negotiating stairs at home w/ no significant difficulty  Objective: See treatment diary below  Precautions: post hip precautions      Manuals 7/10 7/14 7/16 7/21         PROM L hip flex, ext, abd nv 10 min 10 min 10 min                                                Neuro Re-Ed 71/0          clamshells nv 5"x10 5"x10 5"x15         bridges nv 5"x20 5"x20 5"x20         Hip abd w/ tband  GTB 5"x20 GTB 5"x20 GTB 5"x20         SAQ  5"x20 5"x20 5"x20         Mini squat   x20 x20         Step ups    6in x20         amb w/ SPC  5 min           Ther Ex 7/10 7/14 7/16 7/21         Recumbent bike  5 min 7 min 10 min         LAQ 5"x10 5"x20 5"x20 5"x20         Adductor squeezes 5"x10 5"x20 5"x20 5"x20         Seated marching nv x20 x20 x20         Heel slides nv 10" x10 10" x10 10" x10         HR   x20 x20                                   Ther Activity                                       Gait Training                                       Modalities 7/10 7/14 7/16 7/21         CP post nv np 10 min 10 min                              Assessment: Tolerated treatment well  Patient demonstrated fatigue post treatment, exhibited good technique with therapeutic exercises and would benefit from continued PT   Added step ups w/ good tolerance  Pt progressing very well  Plan: Continue per plan of care

## 2020-07-23 ENCOUNTER — OFFICE VISIT (OUTPATIENT)
Dept: PHYSICAL THERAPY | Facility: MEDICAL CENTER | Age: 65
End: 2020-07-23
Payer: MEDICARE

## 2020-07-23 DIAGNOSIS — K21.9 GASTROESOPHAGEAL REFLUX DISEASE, ESOPHAGITIS PRESENCE NOT SPECIFIED: ICD-10-CM

## 2020-07-23 DIAGNOSIS — Z96.642 STATUS POST LEFT HIP REPLACEMENT: ICD-10-CM

## 2020-07-23 DIAGNOSIS — M16.12 UNILATERAL OSTEOARTHRITIS OF HIP, LEFT: Primary | ICD-10-CM

## 2020-07-23 PROCEDURE — 97110 THERAPEUTIC EXERCISES: CPT | Performed by: PHYSICAL THERAPIST

## 2020-07-23 PROCEDURE — 97112 NEUROMUSCULAR REEDUCATION: CPT | Performed by: PHYSICAL THERAPIST

## 2020-07-23 PROCEDURE — 97140 MANUAL THERAPY 1/> REGIONS: CPT | Performed by: PHYSICAL THERAPIST

## 2020-07-23 NOTE — PROGRESS NOTES
Daily Note     Today's date: 2020  Patient name: Juan Santillan  : 1955  MRN: [id number]  Referring provider: Sahra Young  Dx:   Encounter Diagnosis     ICD-10-CM    1  Unilateral osteoarthritis of hip, left M16 12    2  Status post left hip replacement Z96 642                   Subjective:  Pt offers no c/o pain today  Feels she no longer requires [facility] for ambulation  Objective: See treatment diary below  Precautions: post hip precautions      Manuals 7/10 7/14 7/16 7/21 7/23        PROM L hip flex, ext, abd nv 10 min 10 min 10 min 10 min                                               Neuro Re-Ed 71/0         clamshells nv 5"x10 5"x10 5"x15 5"x15        bridges nv 5"x20 5"x20 5"x20 5"x20        Hip abd w/ tband  GTB 5"x20 GTB 5"x20 GTB 5"x20 GTB 5"x20        SAQ  5"x20 5"x20 5"x20 5"x20        Mini squat   x20 x20 x20        Step ups    6in x20 6in x20        SLR flex     x10        Ther Ex 7/10 7/14 7/16 7/21 7/23        Recumbent bike  5 min 7 min 10 min 10 min        LAQ 5"x10 5"x20 5"x20 5"x20 5"x20        Adductor squeezes 5"x10 5"x20 5"x20 5"x20 5"x20        Seated marching nv x20 x20 x20 x20        Heel slides nv 10" x10 10" x10 10" x10 10" x10        HR   x20 x20 x20                                  Ther Activity                                       Gait Training                                       Modalities 7/10 7/14 7/16 7/21 7/23        CP post nv np 10 min 10 min 10 min                             Assessment: Tolerated treatment well  Patient demonstrated fatigue post treatment, exhibited good technique with therapeutic exercises and would benefit from continued PT   Pt continues to progress very well  No significant gait deviations w/out AD      Plan: Continue per plan of care

## 2020-07-24 RX ORDER — OMEPRAZOLE 20 MG/1
CAPSULE, DELAYED RELEASE ORAL
Qty: 90 CAPSULE | Refills: 1 | Status: SHIPPED | OUTPATIENT
Start: 2020-07-24 | End: 2021-01-16

## 2020-07-27 ENCOUNTER — TELEPHONE (OUTPATIENT)
Dept: OBGYN CLINIC | Facility: HOSPITAL | Age: 65
End: 2020-07-27

## 2020-07-27 DIAGNOSIS — Z96.642 STATUS POST LEFT HIP REPLACEMENT: ICD-10-CM

## 2020-07-27 RX ORDER — OXYCODONE HYDROCHLORIDE 5 MG/1
5 TABLET ORAL EVERY 6 HOURS PRN
Qty: 20 TABLET | Refills: 0 | Status: SHIPPED | OUTPATIENT
Start: 2020-07-27 | End: 2020-07-31 | Stop reason: SDUPTHER

## 2020-07-27 NOTE — TELEPHONE ENCOUNTER
Patient DR Alba Venegas- Total hip  Re: Refill   # [phone]    Patient called for refill       oxyCODONE (ROXICODONE) 5 mg immediate release tablet [[id number]]     Order Details   Dose: 5 mg Route: Oral Frequency: Every 4 hours PRN for moderate pain   Note to Pharmacy:   Ongoing therapy        Dispense Quantity: 30 tablet Refills: 0 Fills remaining: --           Sig: Take 1 tablet (5 mg total) by mouth every 4 (four) hours as needed for moderate pain for up to 7 days Ongoing therapyMax Daily Amount: 30 mg          PHARMACY  Pharmacy:  [facility]/pharmacy #[id number]- [place], [place] - [address]   [facility] Phone:  [phone] Fax:  [phone]

## 2020-07-28 ENCOUNTER — OFFICE VISIT (OUTPATIENT)
Dept: PHYSICAL THERAPY | Facility: MEDICAL CENTER | Age: 65
End: 2020-07-28
Payer: MEDICARE

## 2020-07-28 DIAGNOSIS — M16.12 UNILATERAL OSTEOARTHRITIS OF HIP, LEFT: Primary | ICD-10-CM

## 2020-07-28 DIAGNOSIS — Z96.642 STATUS POST LEFT HIP REPLACEMENT: ICD-10-CM

## 2020-07-28 PROCEDURE — 97140 MANUAL THERAPY 1/> REGIONS: CPT | Performed by: PHYSICAL THERAPIST

## 2020-07-28 PROCEDURE — 97110 THERAPEUTIC EXERCISES: CPT | Performed by: PHYSICAL THERAPIST

## 2020-07-28 PROCEDURE — 97112 NEUROMUSCULAR REEDUCATION: CPT | Performed by: PHYSICAL THERAPIST

## 2020-07-28 NOTE — PROGRESS NOTES
Daily Note     Today's date: 2020  Patient name: Steve Encinas  : 1955  MRN: [id number]  Referring provider: Ran Hadley  Dx:   Encounter Diagnosis     ICD-10-CM    1  Unilateral osteoarthritis of hip, left M16 12    2  Status post left hip replacement Z96 642                   Subjective:  Pt no longer using AD for ambulation; doing very well  Objective: See treatment diary below  Precautions: post hip precautions      Manuals 7/10 7/14 7/16 7/21 7/23 7/28       PROM L hip flex, ext, abd nv 10 min 10 min 10 min 10 min 10 min                                              Neuro Re-Ed 71/0        clamshells nv 5"x10 5"x10 5"x15 5"x15 5"x20       bridges nv 5"x20 5"x20 5"x20 5"x20 5"x20       Hip abd w/ tband  GTB 5"x20 GTB 5"x20 GTB 5"x20 GTB 5"x20 bluTB 5"x20       SAQ  5"x20 5"x20 5"x20 5"x20 5"x20       Mini squat   x20 x20 x20 x20       Step ups    6in x20 6in x20 6in x20       SLR flex     x10 x10       Ther Ex 7/10 7/14 7/16 7/21 7/23 7/28       Recumbent bike  5 min 7 min 10 min 10 min L2; 10 min       LAQ 5"x10 5"x20 5"x20 5"x20 5"x20 5"x20       Adductor squeezes 5"x10 5"x20 5"x20 5"x20 5"x20 5"x20       Seated marching nv x20 x20 x20 x20 x20       Heel slides nv 10" x10 10" x10 10" x10 10" x10 10" x10       HR   x20 x20 x20 x20                                 Ther Activity                                       Gait Training                                       Modalities 7/10 7/14 7/16 7/21 7/23 7/28       CP post nv np 10 min 10 min 10 min 10 min                      Supervised by CC [phone]      Assessment: Tolerated treatment well  Patient demonstrated fatigue post treatment, exhibited good technique with therapeutic exercises and would benefit from continued PT   No complaints offered throughout treatment  Plan: Continue per plan of care

## 2020-07-30 ENCOUNTER — OFFICE VISIT (OUTPATIENT)
Dept: PHYSICAL THERAPY | Facility: MEDICAL CENTER | Age: 65
End: 2020-07-30
Payer: MEDICARE

## 2020-07-30 DIAGNOSIS — Z96.642 STATUS POST LEFT HIP REPLACEMENT: ICD-10-CM

## 2020-07-30 DIAGNOSIS — M16.12 UNILATERAL OSTEOARTHRITIS OF HIP, LEFT: Primary | ICD-10-CM

## 2020-07-30 PROCEDURE — 97112 NEUROMUSCULAR REEDUCATION: CPT | Performed by: PHYSICAL THERAPIST

## 2020-07-30 PROCEDURE — 97140 MANUAL THERAPY 1/> REGIONS: CPT | Performed by: PHYSICAL THERAPIST

## 2020-07-30 PROCEDURE — 97110 THERAPEUTIC EXERCISES: CPT | Performed by: PHYSICAL THERAPIST

## 2020-07-30 NOTE — PROGRESS NOTES
Daily Note     Today's date: 2020  Patient name: Jacky Banuelos  : 1955  MRN: [id number]  Referring provider: Juan C Rios  Dx:   Encounter Diagnosis     ICD-10-CM    1  Unilateral osteoarthritis of hip, left M16 12    2  Status post left hip replacement Z96 642                   Subjective:  Pt offers no complaints prior to treatment today  Objective: See treatment diary below  Precautions: post hip precautions      Manuals 7/10 7/14 7/16 7/21 7/23 7/28 7/30      PROM L hip flex, ext, abd nv 10 min 10 min 10 min 10 min 10 min 10 min                                             Neuro Re-Ed 71/0       clamshells nv 5"x10 5"x10 5"x15 5"x15 5"x20 5"x20      bridges nv 5"x20 5"x20 5"x20 5"x20 5"x20 5"x20      Hip abd w/ tband  GTB 5"x20 GTB 5"x20 GTB 5"x20 GTB 5"x20 bluTB 5"x20 BluTB 5"x20      SAQ  5"x20 5"x20 5"x20 5"x20 5"x20 5"x20      Mini squat   x20 x20 x20 x20 x20      Step ups    6in x20 6in x20 6in x20 6in x20 and lateral      SLR flex     x10 x10 x15      Ther Ex 7/10 7/14 7/16 7/21 7/23 7/28 7/30      Recumbent bike  5 min 7 min 10 min 10 min L2; 10 min L2 x10 min      LAQ 5"x10 5"x20 5"x20 5"x20 5"x20 5"x20 5"x20      Adductor squeezes 5"x10 5"x20 5"x20 5"x20 5"x20 5"x20 5"x20      Seated marching nv x20 x20 x20 x20 x20 x20      Heel slides nv 10" x10 10" x10 10" x10 10" x10 10" x10 NP      HR   x20 x20 x20 x20 x20                                Ther Activity                                       Gait Training                                       Modalities 7/10 7/14 7/16 7/21 7/23 7/28 7/30      CP post nv np 10 min 10 min 10 min 10 min np                           Assessment: Tolerated treatment well  Patient demonstrated fatigue post treatment, exhibited good technique with therapeutic exercises and would benefit from continued PT   Added lateral step ups w/ good tolerance  Plan: Continue per plan of care

## 2020-07-31 ENCOUNTER — TELEPHONE (OUTPATIENT)
Dept: OBGYN CLINIC | Facility: HOSPITAL | Age: 65
End: 2020-07-31

## 2020-07-31 DIAGNOSIS — Z96.642 STATUS POST LEFT HIP REPLACEMENT: ICD-10-CM

## 2020-07-31 RX ORDER — OXYCODONE HYDROCHLORIDE 5 MG/1
5 TABLET ORAL EVERY 6 HOURS PRN
Qty: 20 TABLET | Refills: 0 | Status: SHIPPED | OUTPATIENT
Start: 2020-07-31 | End: 2020-08-06

## 2020-07-31 NOTE — TELEPHONE ENCOUNTER
Pt contacted me today, reporting that since Monday when she was advised to stretch her oxycodone usage she is having worsening pain and difficulty handling it  Refill was sent on 7/27 for Oxycodone 5mg tablets, 1 tablet every 6 hours PRN for Severe pain  Pt reports stretching it to 9-10 hours sometimes and having difficulty controlling pain then after  Pt educated script was sent for severe pain, and can be taken 6 hors PRN  Pt reminded to continue to taking OTC tylenol around the clock for continuous pain medication, with max dose of 3000/24 hours  Pt reports she will be out of Oxycodone again on Monday

## 2020-07-31 NOTE — TELEPHONE ENCOUNTER
Refill of oxycodone was sent in  After she is done with her Lovenox we will try bringing some ibuprofen or naproxen into her pain control regimen which should by after 1 more week she should be done with DVT ppx   We will likely soon transition her to tramadol after that as well

## 2020-08-04 ENCOUNTER — OFFICE VISIT (OUTPATIENT)
Dept: PHYSICAL THERAPY | Facility: MEDICAL CENTER | Age: 65
End: 2020-08-04
Payer: MEDICARE

## 2020-08-04 DIAGNOSIS — M16.12 UNILATERAL OSTEOARTHRITIS OF HIP, LEFT: Primary | ICD-10-CM

## 2020-08-04 DIAGNOSIS — Z96.642 STATUS POST LEFT HIP REPLACEMENT: ICD-10-CM

## 2020-08-04 PROCEDURE — 97112 NEUROMUSCULAR REEDUCATION: CPT | Performed by: PHYSICAL THERAPIST

## 2020-08-04 PROCEDURE — 97110 THERAPEUTIC EXERCISES: CPT | Performed by: PHYSICAL THERAPIST

## 2020-08-04 PROCEDURE — 97140 MANUAL THERAPY 1/> REGIONS: CPT | Performed by: PHYSICAL THERAPIST

## 2020-08-04 NOTE — PROGRESS NOTES
Daily Note     Today's date: 2020  Patient name: Gonzales Gardiner  : 1955  MRN: [id number]  Referring provider: Marin Quiles  Dx:   Encounter Diagnosis     ICD-10-CM    1  Unilateral osteoarthritis of hip, left  M16 12    2  Status post left hip replacement  Z96 642                   Subjective:  Pt reports mild intermittent lateral hip pain when "moving a certain way"      Objective: See treatment diary below  Precautions: post hip precautions      Manuals 7/10 7/14 7/16 7/21 7/23 7/28 7/30 84     PROM L hip flex, ext, abd nv 10 min 10 min 10 min 10 min 10 min 10 min 7  min     TPR L piriformis        5 min                               Neuro Re-Ed 71/0  84     clamshells nv 5"x10 5"x10 5"x15 5"x15 5"x20 5"x20 5"x20     bridges nv 5"x20 5"x20 5"x20 5"x20 5"x20 5"x20 5"x20     Hip abd w/ tband  GTB 5"x20 GTB 5"x20 GTB 5"x20 GTB 5"x20 bluTB 5"x20 BluTB 5"x20 Darek TB 5"x20     SAQ  5"x20 5"x20 5"x20 5"x20 5"x20 5"x20 5"x20     Mini squat   x20 x20 x20 x20 x20 x20     Step ups    6in x20 6in x20 6in x20 6in x20 and lateral 6in x20 and lateral     SLR flex     x10 x10 x15 x20     Ther Ex 7/10 7/14 7/16 7/21 7/23 7/28 7/30 8/4     Recumbent bike  5 min 7 min 10 min 10 min L2; 10 min L2 x10 min L2; 10 min     LAQ 5"x10 5"x20 5"x20 5"x20 5"x20 5"x20 5"x20 5"x20     Adductor squeezes 5"x10 5"x20 5"x20 5"x20 5"x20 5"x20 5"x20 5"x20     Seated marching nv x20 x20 x20 x20 x20 x20 x20     Heel slides nv 10" x10 10" x10 10" x10 10" x10 10" x10 NP np     HR   x20 x20 x20 x20 x20 x20                               Ther Activity                                       Gait Training                                       Modalities 7/10 7/14 7/16 7/21 7/23 7/28 7/30      CP post nv np 10 min 10 min 10 min 10 min np                           Assessment: Tolerated treatment well   Patient demonstrated fatigue post treatment, exhibited good technique with therapeutic exercises and would benefit from continued PT   Added TPR over piriformis to address new c/o soreness  Plan: Continue per plan of care

## 2020-08-06 ENCOUNTER — OFFICE VISIT (OUTPATIENT)
Dept: PHYSICAL THERAPY | Facility: MEDICAL CENTER | Age: 65
End: 2020-08-06
Payer: MEDICARE

## 2020-08-06 ENCOUNTER — TELEPHONE (OUTPATIENT)
Dept: OBGYN CLINIC | Facility: HOSPITAL | Age: 65
End: 2020-08-06

## 2020-08-06 DIAGNOSIS — Z96.642 STATUS POST LEFT HIP REPLACEMENT: ICD-10-CM

## 2020-08-06 DIAGNOSIS — M16.12 UNILATERAL OSTEOARTHRITIS OF HIP, LEFT: Primary | ICD-10-CM

## 2020-08-06 DIAGNOSIS — Z96.642 STATUS POST LEFT HIP REPLACEMENT: Primary | ICD-10-CM

## 2020-08-06 PROCEDURE — 97110 THERAPEUTIC EXERCISES: CPT | Performed by: PHYSICAL THERAPIST

## 2020-08-06 PROCEDURE — 97112 NEUROMUSCULAR REEDUCATION: CPT | Performed by: PHYSICAL THERAPIST

## 2020-08-06 PROCEDURE — 97140 MANUAL THERAPY 1/> REGIONS: CPT | Performed by: PHYSICAL THERAPIST

## 2020-08-06 RX ORDER — TRAMADOL HYDROCHLORIDE 50 MG/1
50 TABLET ORAL EVERY 6 HOURS PRN
Qty: 30 TABLET | Refills: 0 | Status: SHIPPED | OUTPATIENT
Start: 2020-08-06 | End: 2020-08-17 | Stop reason: SDUPTHER

## 2020-08-06 NOTE — TELEPHONE ENCOUNTER
Patient states that she no longer takes the Meloxicam 15mg  Patient states that Dr Hernandez discontinue  Patient currently recovering from hip replacement

## 2020-08-06 NOTE — TELEPHONE ENCOUNTER
Spoke to patient  She stated she is taking tylenol 1000 mg once daily currently  Advisd 1000 mg Q8h and Ibuprofen starting tomorrow since she finished the lovenox this morning  Paticasien prefers ibuprofen since she already has this on hand  Advised max limit of 2400 mg of ibuprofen in 24 hrs  600 mg Q6h would be good, with food  Verbalized understanding  Aware tramadol was sent in for her   Thanks

## 2020-08-06 NOTE — TELEPHONE ENCOUNTER
Pt contacted Call Center requested refill of their medication  Medication Name:oxyCODONE (ROXICODONE)      Dosage of Med:5 mg immediate release tablet [[id number]]       Frequency of Med:  Take 1 tablet (5 mg total) by mouth every 6 (six) hours as needed for severe pain for up to 7 days Ongoing therapyMax Daily Amount: 20 mg              Remaining Medication: 8 tablets      Pharmacy and Location: [address]        Pt  Preferred Callback Phone Number:  [phone]      Thank you

## 2020-08-06 NOTE — TELEPHONE ENCOUNTER
Please call the patient let her know that we are changing her medication to tramadol  Prescription was sent in  Please advise her that she can start taking Aleve every 12 hours after Lovenox is complete and that she should continue taking Tylenol as well for pain relief

## 2020-08-06 NOTE — PROGRESS NOTES
Daily Note     Today's date: 2020  Patient name: Zainab Ghotra  : 1955  MRN: [id number]  Referring provider: Suzy Chong  Dx:   Encounter Diagnosis     ICD-10-CM    1  Unilateral osteoarthritis of hip, left  M16 12    2  Status post left hip replacement  Z96 [id number]                   Subjective:  Pt reports lateral hip pain subsided after TPR last visit  Objective: See treatment diary below  Precautions: post hip precautions      Manuals 7/10 7/14 7/16 7/21 7/23 7/28 7/30 8/4 86    PROM L hip flex, ext, abd nv 10 min 10 min 10 min 10 min 10 min 10 min 7  min 7 min    TPR L piriformis        5 min 3 min                              Neuro Re-Ed 71/0  86    clamshells nv 5"x10 5"x10 5"x15 5"x15 5"x20 5"x20 5"x20 5"x20    bridges nv 5"x20 5"x20 5"x20 5"x20 5"x20 5"x20 5"x20 5"x20    Hip abd w/ tband  GTB 5"x20 GTB 5"x20 GTB 5"x20 GTB 5"x20 bluTB 5"x20 BluTB 5"x20 Darek TB 5"x20 BluTB 5"x20    SAQ  5"x20 5"x20 5"x20 5"x20 5"x20 5"x20 5"x20 5"x20    Mini squat   x20 x20 x20 x20 x20 x20 x20    Step ups    6in x20 6in x20 6in x20 6in x20 and lateral 6in x20 and lateral 6in x20 and lateral    SLR flex     x10 x10 x15 x20 x20    Ther Ex 7/10 7/14 7/16 7/21 7/23 7/28 7/30 8 86    Recumbent bike  5 min 7 min 10 min 10 min L2; 10 min L2 x10 min L2; 10 min L2; 10 min    LAQ 5"x10 5"x20 5"x20 5"x20 5"x20 5"x20 5"x20 5"x20 5"x20    Adductor squeezes 5"x10 5"x20 5"x20 5"x20 5"x20 5"x20 5"x20 5"x20 5"x20    Seated marching nv x20 x20 x20 x20 x20 x20 x20 x20    Heel slides nv 10" x10 10" x10 10" x10 10" x10 10" x10 NP np np    HR   x20 x20 x20 x20 x20 x20 x20                              Ther Activity                                       Gait Training                                       Modalities 7/10 7/14 7/16 7/21 7/23 7/28 7/30      CP post nv np 10 min 10 min 10 min 10 min np                           Assessment: Tolerated treatment well   Patient demonstrated fatigue post treatment, exhibited good technique with therapeutic exercises and would benefit from continued PT   No complaints throughout treatment today  Pt progressing very well  Plan: Continue per plan of care

## 2020-08-11 ENCOUNTER — OFFICE VISIT (OUTPATIENT)
Dept: PHYSICAL THERAPY | Facility: MEDICAL CENTER | Age: 65
End: 2020-08-11
Payer: MEDICARE

## 2020-08-11 DIAGNOSIS — Z96.642 STATUS POST LEFT HIP REPLACEMENT: ICD-10-CM

## 2020-08-11 DIAGNOSIS — M16.12 UNILATERAL OSTEOARTHRITIS OF HIP, LEFT: Primary | ICD-10-CM

## 2020-08-11 PROCEDURE — 97112 NEUROMUSCULAR REEDUCATION: CPT | Performed by: PHYSICAL THERAPIST

## 2020-08-11 PROCEDURE — 97140 MANUAL THERAPY 1/> REGIONS: CPT | Performed by: PHYSICAL THERAPIST

## 2020-08-11 PROCEDURE — 97110 THERAPEUTIC EXERCISES: CPT | Performed by: PHYSICAL THERAPIST

## 2020-08-11 NOTE — PROGRESS NOTES
PT Re-Evaluation     Today's date: 2020  Patient name: Carolyn Albarado  : 1955  MRN: [id number]  Referring provider: Reginald Mak  Dx:   Encounter Diagnosis     ICD-10-CM    1  Unilateral osteoarthritis of hip, left  M16 12    2  Status post left hip replacement  Z96 642                   Assessment  Assessment details: Carolyn Albarado has attended 11 visits since initiating PT and has demonstrated overall improvement  Mobility and strength has improved with decreased reports of pain  Carolyn Albarado has demonstrated an improvement in function as well  Currently, she has made steady progress towards her goals, but continue to remain limited with prolonged walking and heavy household chores  At this time, continued physical therapy is recommended in order to address their remaining impairments in effort to further improve function  Impairments: abnormal gait, abnormal or restricted ROM, abnormal movement, activity intolerance, impaired physical strength, lacks appropriate home exercise program, pain with function and weight-bearing intolerance  Understanding of Dx/Px/POC: good   Prognosis: good    Goals  Short Term Goals:   1  Pain decreased 2 ratings in 6 weeks MET  2   ROM increased 10* in 6 weeks MET  3  Strength increased 1/2 grade in 6 weeks MET    Long Term Goals:   1  ADLS/IADLS in related activities improved to maximal level in 12 weeks PARTIALLY MET  2  Ambulation is improved to maximal level in 12 weeks PARTIALLY MET  3  [place] with HEP in 12 weeks   PARTIALLY MET    Plan  Patient would benefit from: skilled physical therapy  Planned modality interventions: cryotherapy  Planned therapy interventions: therapeutic exercise, stretching, strengthening, patient education, neuromuscular re-education, manual therapy, functional ROM exercises, flexibility, gait training and home exercise program  Frequency: 2x week  Duration in weeks: 6  Treatment plan discussed with: patient        Subjective Evaluation    History of Present Illness  Mechanism of injury: Pt reports significant improvement  Most pain is present w/ prolonged walking  Mild stiffness w/ sit to stand and car transfers  No longer having issues w/ sleep disturbance  She has resumed laundry, dishes, dusting  She has not yet resumed sweeping, scrubbing, and vacuuming      Pain  At best pain ratin  At worst pain ratin    Patient Goals  Patient goals for therapy: decreased pain, increased motion, increased strength and independence with ADLs/IADLs          Objective     Palpation     Additional Palpation Details  Mild tightness persisting over glut med    Active Range of Motion   Left Hip   Flexion: 90 degrees   Extension: 10 degrees   Abduction: 48 degrees     Passive Range of Motion   Left Hip   Normal passive range of motion    Strength/Myotome Testing     Left Hip   Planes of Motion   Flexion: 4-  Extension: 4  Abduction: 4+    Left Knee   Extension: 3-    Ambulation     Comments   No significant gait deviations             Precautions: post hip precautions      Manuals             PROM L hip 5 min            TPR L glute med/piriformis 5 min                                      Neuro Re-Ed             clamshells 5"x20            bridges 5"x20            Hip abd w/ Tband BluTB 5"x20            SAQ 5"x20            SLR flex x20            Mini squats x20            Step ups fwd and lat 6in x20                                                   There ex             Recumbent bike L2; 10 min            LAQ 5"x20            Seated marching x20            HR x20                         Ther Activity                                       Gait Training                                       Modalities 7/10            CP post nv

## 2020-08-13 ENCOUNTER — OFFICE VISIT (OUTPATIENT)
Dept: PHYSICAL THERAPY | Facility: MEDICAL CENTER | Age: 65
End: 2020-08-13
Payer: MEDICARE

## 2020-08-13 DIAGNOSIS — M16.12 UNILATERAL OSTEOARTHRITIS OF HIP, LEFT: Primary | ICD-10-CM

## 2020-08-13 DIAGNOSIS — Z96.642 STATUS POST LEFT HIP REPLACEMENT: ICD-10-CM

## 2020-08-13 PROCEDURE — 97110 THERAPEUTIC EXERCISES: CPT | Performed by: PHYSICAL THERAPIST

## 2020-08-13 PROCEDURE — 97112 NEUROMUSCULAR REEDUCATION: CPT | Performed by: PHYSICAL THERAPIST

## 2020-08-13 PROCEDURE — 97140 MANUAL THERAPY 1/> REGIONS: CPT | Performed by: PHYSICAL THERAPIST

## 2020-08-13 NOTE — PROGRESS NOTES
Daily Note     Today's date: 2020  Patient name: Denisha Randle  : 1955  MRN: [id number]  Referring provider: Abdiel Matta  Dx:   Encounter Diagnosis     ICD-10-CM    1  Unilateral osteoarthritis of hip, left  M16 12    2  Status post left hip replacement  Z96 642                   Subjective: Pt offers no complaints today  Objective: See treatment diary below      Assessment: Tolerated treatment well  Patient demonstrated fatigue post treatment, exhibited good technique with therapeutic exercises and would benefit from continued PT      Plan: Continue per plan of care        Precautions: post hip precautions      Manuals            PROM L hip ext 5 min 5 min           TPR L glute med/piriformis 5 min 5 min                                     Neuro Re-Ed            clamshells 5"x20 5"x20           bridges 5"x20 5"x20           Hip abd w/ Tband BluTB 5"x20 NP           SAQ 5"x20 5"x20           SLR flex x20 x20           Mini squats x20 x20           Step ups fwd and lat 6in x20 6in x20 ea           Side stepping  GTB 4 laps                                     There ex            Recumbent bike L2; 10 min L2; 10 min           LAQ 5"x20 5"x20           Seated marching x20 x20           HR x20 x20                        Ther Activity                                       Gait Training                                       Modalities             CP post

## 2020-08-17 ENCOUNTER — OFFICE VISIT (OUTPATIENT)
Dept: OBGYN CLINIC | Facility: CLINIC | Age: 65
End: 2020-08-17

## 2020-08-17 ENCOUNTER — APPOINTMENT (OUTPATIENT)
Dept: RADIOLOGY | Facility: AMBULARY SURGERY CENTER | Age: 65
End: 2020-08-17
Attending: ORTHOPAEDIC SURGERY
Payer: MEDICARE

## 2020-08-17 VITALS
HEART RATE: 87 BPM | DIASTOLIC BLOOD PRESSURE: 78 MMHG | SYSTOLIC BLOOD PRESSURE: 130 MMHG | WEIGHT: 142 LBS | BODY MASS INDEX: 24.24 KG/M2 | HEIGHT: 64 IN

## 2020-08-17 DIAGNOSIS — Z96.642 STATUS POST LEFT HIP REPLACEMENT: Primary | ICD-10-CM

## 2020-08-17 DIAGNOSIS — Z96.642 STATUS POST LEFT HIP REPLACEMENT: ICD-10-CM

## 2020-08-17 PROCEDURE — 73502 X-RAY EXAM HIP UNI 2-3 VIEWS: CPT

## 2020-08-17 PROCEDURE — 99024 POSTOP FOLLOW-UP VISIT: CPT | Performed by: ORTHOPAEDIC SURGERY

## 2020-08-17 PROCEDURE — 4040F PNEUMOC VAC/ADMIN/RCVD: CPT | Performed by: ORTHOPAEDIC SURGERY

## 2020-08-17 PROCEDURE — 3008F BODY MASS INDEX DOCD: CPT | Performed by: ORTHOPAEDIC SURGERY

## 2020-08-17 PROCEDURE — 1111F DSCHRG MED/CURRENT MED MERGE: CPT | Performed by: ORTHOPAEDIC SURGERY

## 2020-08-17 PROCEDURE — 1160F RVW MEDS BY RX/DR IN RCRD: CPT | Performed by: ORTHOPAEDIC SURGERY

## 2020-08-17 RX ORDER — TRAMADOL HYDROCHLORIDE 50 MG/1
50 TABLET ORAL EVERY 6 HOURS PRN
Qty: 10 TABLET | Refills: 0 | Status: SHIPPED | OUTPATIENT
Start: 2020-08-17 | End: 2020-08-21 | Stop reason: SDUPTHER
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
## 2020-08-18 ENCOUNTER — OFFICE VISIT (OUTPATIENT)
Dept: PHYSICAL THERAPY | Facility: MEDICAL CENTER | Age: 65
End: 2020-08-18
Payer: MEDICARE

## 2020-08-18 DIAGNOSIS — M16.12 UNILATERAL OSTEOARTHRITIS OF HIP, LEFT: Primary | ICD-10-CM

## 2020-08-18 DIAGNOSIS — Z96.642 STATUS POST LEFT HIP REPLACEMENT: ICD-10-CM

## 2020-08-18 PROCEDURE — 97112 NEUROMUSCULAR REEDUCATION: CPT | Performed by: PHYSICAL THERAPIST

## 2020-08-18 PROCEDURE — 97110 THERAPEUTIC EXERCISES: CPT | Performed by: PHYSICAL THERAPIST

## 2020-08-18 PROCEDURE — 97140 MANUAL THERAPY 1/> REGIONS: CPT | Performed by: PHYSICAL THERAPIST

## 2020-08-18 NOTE — PROGRESS NOTES
Daily Note     Today's date: 2020  Patient name: Christina Loyd  : 1955  MRN: [id number]  Referring provider: Carolina Souza  Dx:   Encounter Diagnosis     ICD-10-CM    1  Unilateral osteoarthritis of hip, left  M16 12    2  Status post left hip replacement  Z96 642                   Subjective:  Pt had f/u w/ MD and reports he was pleased w/ progress  Objective: See treatment diary below      Assessment: Tolerated treatment well  Patient demonstrated fatigue post treatment, exhibited good technique with therapeutic exercises and would benefit from continued PT   Updated HEP today  Plan: Continue per plan of care        Precautions: post hip precautions      Manuals           PROM L hip ext 5 min 5 min np          TPR L glute med/piriformis 5 min 5 min 10 min                                    Neuro Re-Ed           clamshells 5"x20 5"x20 RTB 5"x20          bridges 5"x20 5"x20 W/ alt kicks x20          Hip abd w/ Tband BluTB 5"x20 NP np          SAQ 5"x20 5"x20 5"x20          SLR flex x20 x20 x20          Mini squats x20 x20 x20          Step ups fwd and lat 6in x20 6in x20 ea 6in x20 ea          Side stepping  GTB 4 laps GTB 4 laps                                    There ex           Recumbent bike L2; 10 min L2; 10 min L3; 10 min          LAQ 5"x20 5"x20 5"x20          Seated marching x20 x20 x20          HR x20 x20 x20                       Ther Activity                                       Gait Training                                       Modalities             CP post

## 2020-08-20 ENCOUNTER — OFFICE VISIT (OUTPATIENT)
Dept: PHYSICAL THERAPY | Facility: MEDICAL CENTER | Age: 65
End: 2020-08-20
Payer: MEDICARE

## 2020-08-20 DIAGNOSIS — M16.12 UNILATERAL OSTEOARTHRITIS OF HIP, LEFT: Primary | ICD-10-CM

## 2020-08-20 DIAGNOSIS — Z96.642 STATUS POST LEFT HIP REPLACEMENT: ICD-10-CM

## 2020-08-20 PROCEDURE — 97140 MANUAL THERAPY 1/> REGIONS: CPT | Performed by: PHYSICAL THERAPIST

## 2020-08-20 PROCEDURE — 97112 NEUROMUSCULAR REEDUCATION: CPT | Performed by: PHYSICAL THERAPIST

## 2020-08-20 NOTE — PROGRESS NOTES
Daily Note and Discharge    Today's date: 2020  Patient name: Mario Lora  : 1955  MRN: [id number]  Referring provider: Cristal Mandel  Dx:   Encounter Diagnosis     ICD-10-CM    1  Unilateral osteoarthritis of hip, left  M16 12    2  Status post left hip replacement  Z96 642                   Subjective:  Pt offers no c/o pain and reports continued compliance w/ HEP      Objective: See treatment diary below      Assessment: Tolerated treatment well  Patient demonstrated fatigue post treatment and exhibited good technique with therapeutic exercises   Pt progressed very well w/ PT and has resumed most daily activity           Plan: DC today to independent HEP     Precautions: post hip precautions      Manuals          PROM L hip ext 5 min 5 min np np         TPR L glute med/piriformis 5 min 5 min 10 min 10 min                                   Neuro Re-Ed          clamshells 5"x20 5"x20 RTB 5"x20 RTB 5"x20         bridges 5"x20 5"x20 W/ alt kicks x20 Kicks x20         Hip abd w/ Tband BluTB 5"x20 NP np np         SAQ 5"x20 5"x20 5"x20 5"x20         SLR flex x20 x20 x20 x20         Mini squats x20 x20 x20 x20         Step ups fwd and lat 6in x20 6in x20 ea 6in x20 ea 6in x20         Side stepping  GTB 4 laps GTB 4 laps GTB 4 laps                                   There ex          Recumbent bike L2; 10 min L2; 10 min L3; 10 min L3; 10 min         LAQ 5"x20 5"x20 5"x20 5"x20         Seated marching x20 x20 x20 x20         HR x20 x20 x20 x20                      Ther Activity                                       Gait Training                                       Modalities             CP post

## 2020-08-21 ENCOUNTER — OFFICE VISIT (OUTPATIENT)
Dept: RHEUMATOLOGY | Facility: CLINIC | Age: 65
End: 2020-08-21
Payer: MEDICARE

## 2020-08-21 ENCOUNTER — TELEPHONE (OUTPATIENT)
Dept: OBGYN CLINIC | Facility: HOSPITAL | Age: 65
End: 2020-08-21

## 2020-08-21 VITALS — TEMPERATURE: 99.8 F | BODY MASS INDEX: 24.24 KG/M2 | HEIGHT: 64 IN | WEIGHT: 142 LBS

## 2020-08-21 DIAGNOSIS — Z96.642 STATUS POST LEFT HIP REPLACEMENT: ICD-10-CM

## 2020-08-21 DIAGNOSIS — M81.8 OTHER OSTEOPOROSIS WITHOUT CURRENT PATHOLOGICAL FRACTURE: Primary | ICD-10-CM

## 2020-08-21 PROCEDURE — 1160F RVW MEDS BY RX/DR IN RCRD: CPT | Performed by: INTERNAL MEDICINE

## 2020-08-21 PROCEDURE — 1111F DSCHRG MED/CURRENT MED MERGE: CPT | Performed by: INTERNAL MEDICINE

## 2020-08-21 PROCEDURE — 99211 OFF/OP EST MAY X REQ PHY/QHP: CPT | Performed by: INTERNAL MEDICINE

## 2020-08-21 PROCEDURE — 96372 THER/PROPH/DIAG INJ SC/IM: CPT | Performed by: INTERNAL MEDICINE

## 2020-08-21 PROCEDURE — 3008F BODY MASS INDEX DOCD: CPT | Performed by: INTERNAL MEDICINE

## 2020-08-21 PROCEDURE — 4040F PNEUMOC VAC/ADMIN/RCVD: CPT | Performed by: INTERNAL MEDICINE

## 2020-08-21 RX ORDER — TRAMADOL HYDROCHLORIDE 50 MG/1
50 TABLET ORAL EVERY 6 HOURS PRN
Qty: 10 TABLET | Refills: 0 | Status: SHIPPED | OUTPATIENT
Start: 2020-08-21 | End: 2020-11-04 | Stop reason: ALTCHOICE

## 2020-08-21 NOTE — PROGRESS NOTES
Patient was in the office for her next Prolia injection  Prolia 60mg was injected subcutaneously into the left upper extremity  She tolerated this well and left without complications   She will return in 6 months for her next Prolia injection and follow up with Dewitt Kayser

## 2020-08-21 NOTE — TELEPHONE ENCOUNTER
Reviewed chart  Refill was provided for tramadol 50 mg x 10 tabs on 8/17 by Dr Rafael Oconnor will provide one more refill for 10 tabs  Attempted to call patient with no response  Left voicemail that one more refill will be provided by Dr Rafael Oconnor, but she is to wean off narcotics and take OTC NSAIDs or tylenol

## 2020-08-21 NOTE — TELEPHONE ENCOUNTER
Intervention: collaboration with care providers, enteral nutrition therapy    Recommendations    Recommendation:  1) Advance PO diet to goal of DM 2000 kcal, renal diet, texture per SLP if medically able   2) Continue TF nocturnal Novasource renal @ 60 ml/hr x 12 hr ( 7PM-7AM) + min 30 ml flush q 4 hr for tube patency to meet ~50% of needs ( 1440 (58% EEN), 65 g protein ( 66% EPN), and 518 ml free water)   -If NPO at RD f/u change TF to continuous Novasource renal @ 50 ml/hr + 105  ml flush q 4 hr  (providing 2400 kcals (98% EEN), 108 g (100% EPN), and 864 ml free water)    3) Add novasource renal BID PO   4) weigh pt weekly    Goals: 1) PO diet and TF initiated by f/u  Nutrition Goal Status: progressing towards goal, goal met  Communication of RD Recs: (POC, sticky note, second sign reviewed with MD)   Per note on 8/17 with Dr Perla Burnett, patient has been doing well and did not need more tramadol at that time  She still has 10 left  Please call patient and let her know that she can finish the remaining 10 tabs if needed but should transition to OTC NSAIDs or tylenol for pain as she is over 6 weeks out from surgery  She can call if she has any issues  Thanks!

## 2020-08-21 NOTE — TELEPHONE ENCOUNTER
Pt contacted Call Center requested refill of their medication  Medication Name:traMADol (ULTRAM)      Dosage of Med:50 mg tablet [[id number]]     Order Details     Frequency of Med:  Take 1 tablet (50 mg total) by mouth every 6 (six) hours as needed for severe pain              Remaining Medication: 10      Pharmacy and Location: [facility]      Pt  Preferred Callback Phone Number:  [phone]      Thank you

## 2020-08-21 NOTE — TELEPHONE ENCOUNTER
Spoke to patient  She stated she is taking aleve BID and tylenol as well but still feels like she needs Tramadol  She stated that she iniguez snot have 10 tablets left  She stated she "only has enough for 2 days"  Advised aleve BID and tylenol 1000 mg Q8h for pain as well as ice if needed since she is 6 weeks out  Patient stated that Dr Kings Álvarez informed her that he would call in 10 more tabs if she needed it at her last appointment  Please advise

## 2020-09-14 ENCOUNTER — TELEPHONE (OUTPATIENT)
Dept: OBGYN CLINIC | Facility: CLINIC | Age: 65
End: 2020-09-14

## 2020-09-14 NOTE — TELEPHONE ENCOUNTER
Dr Hernandez  #: [phone]           Patient is calling in requesting a call back  She had sx 7/7 L hip  Patient would like to know if she start laying on her L side?        Please advise,

## 2020-09-23 ENCOUNTER — IMMUNIZATIONS (OUTPATIENT)
Dept: FAMILY MEDICINE CLINIC | Facility: MEDICAL CENTER | Age: 65
End: 2020-09-23
Payer: MEDICARE

## 2020-09-23 DIAGNOSIS — Z23 ENCOUNTER FOR IMMUNIZATION: ICD-10-CM

## 2020-09-23 PROCEDURE — 90662 IIV NO PRSV INCREASED AG IM: CPT

## 2020-09-23 PROCEDURE — G0008 ADMIN INFLUENZA VIRUS VAC: HCPCS

## 2020-10-01 ENCOUNTER — OFFICE VISIT (OUTPATIENT)
Dept: OBGYN CLINIC | Facility: CLINIC | Age: 65
End: 2020-10-01

## 2020-10-01 ENCOUNTER — APPOINTMENT (OUTPATIENT)
Dept: RADIOLOGY | Facility: AMBULARY SURGERY CENTER | Age: 65
End: 2020-10-01
Attending: ORTHOPAEDIC SURGERY
Payer: MEDICARE

## 2020-10-01 VITALS
BODY MASS INDEX: 24.24 KG/M2 | HEART RATE: 78 BPM | HEIGHT: 64 IN | SYSTOLIC BLOOD PRESSURE: 136 MMHG | DIASTOLIC BLOOD PRESSURE: 84 MMHG | WEIGHT: 142 LBS

## 2020-10-01 DIAGNOSIS — Z96.642 STATUS POST LEFT HIP REPLACEMENT: Primary | ICD-10-CM

## 2020-10-01 DIAGNOSIS — Z96.642 STATUS POST LEFT HIP REPLACEMENT: ICD-10-CM

## 2020-10-01 PROCEDURE — 73502 X-RAY EXAM HIP UNI 2-3 VIEWS: CPT

## 2020-10-01 PROCEDURE — 99024 POSTOP FOLLOW-UP VISIT: CPT | Performed by: ORTHOPAEDIC SURGERY

## 2020-11-04 ENCOUNTER — ANNUAL EXAM (OUTPATIENT)
Dept: FAMILY MEDICINE CLINIC | Facility: MEDICAL CENTER | Age: 65
End: 2020-11-04
Payer: MEDICARE

## 2020-11-04 VITALS
WEIGHT: 142 LBS | DIASTOLIC BLOOD PRESSURE: 64 MMHG | HEIGHT: 64 IN | SYSTOLIC BLOOD PRESSURE: 118 MMHG | TEMPERATURE: 97.5 F | HEART RATE: 60 BPM | BODY MASS INDEX: 24.24 KG/M2

## 2020-11-04 DIAGNOSIS — F51.01 PRIMARY INSOMNIA: ICD-10-CM

## 2020-11-04 DIAGNOSIS — Z12.4 SCREENING FOR CERVICAL CANCER: ICD-10-CM

## 2020-11-04 DIAGNOSIS — Z12.31 ENCOUNTER FOR SCREENING MAMMOGRAM FOR MALIGNANT NEOPLASM OF BREAST: Primary | ICD-10-CM

## 2020-11-04 DIAGNOSIS — N95.2 VAGINITIS, ATROPHIC: ICD-10-CM

## 2020-11-04 DIAGNOSIS — Z23 IMMUNIZATION DUE: ICD-10-CM

## 2020-11-04 DIAGNOSIS — Z01.419 ENCOUNTER FOR GYNECOLOGICAL EXAMINATION WITHOUT ABNORMAL FINDING: ICD-10-CM

## 2020-11-04 PROCEDURE — 90732 PPSV23 VACC 2 YRS+ SUBQ/IM: CPT | Performed by: FAMILY MEDICINE

## 2020-11-04 PROCEDURE — G0009 ADMIN PNEUMOCOCCAL VACCINE: HCPCS | Performed by: FAMILY MEDICINE

## 2020-11-04 PROCEDURE — G0101 CA SCREEN;PELVIC/BREAST EXAM: HCPCS | Performed by: FAMILY MEDICINE

## 2020-11-04 PROCEDURE — 87624 HPV HI-RISK TYP POOLED RSLT: CPT | Performed by: FAMILY MEDICINE

## 2020-11-04 PROCEDURE — G0145 SCR C/V CYTO,THINLAYER,RESCR: HCPCS | Performed by: FAMILY MEDICINE

## 2020-11-04 PROCEDURE — 99214 OFFICE O/P EST MOD 30 MIN: CPT | Performed by: FAMILY MEDICINE

## 2020-11-04 RX ORDER — TRAZODONE HYDROCHLORIDE 50 MG/1
50 TABLET ORAL
Qty: 30 TABLET | Refills: 0 | Status: SHIPPED | OUTPATIENT
Start: 2020-11-04 | End: 2020-11-27

## 2020-11-10 ENCOUNTER — TELEPHONE (OUTPATIENT)
Dept: FAMILY MEDICINE CLINIC | Facility: MEDICAL CENTER | Age: 65
End: 2020-11-10

## 2020-11-10 DIAGNOSIS — N95.2 VAGINITIS, ATROPHIC: Primary | ICD-10-CM

## 2020-11-10 LAB
LAB AP GYN PRIMARY INTERPRETATION: NORMAL
Lab: NORMAL

## 2020-11-11 DIAGNOSIS — N95.2 VAGINITIS, ATROPHIC: ICD-10-CM

## 2020-11-17 RX ORDER — ESTRADIOL 0.1 MG/G
CREAM VAGINAL
Qty: 42.5 G | Refills: 2 | Status: SHIPPED | OUTPATIENT
Start: 2020-11-17 | End: 2021-11-05 | Stop reason: ALTCHOICE

## 2020-11-27 DIAGNOSIS — F51.01 PRIMARY INSOMNIA: ICD-10-CM

## 2020-11-27 RX ORDER — TRAZODONE HYDROCHLORIDE 50 MG/1
TABLET ORAL
Qty: 30 TABLET | Refills: 0 | Status: SHIPPED | OUTPATIENT
Start: 2020-11-27 | End: 2020-12-22
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
## 2020-12-21 DIAGNOSIS — F51.01 PRIMARY INSOMNIA: ICD-10-CM

## 2020-12-22 RX ORDER — TRAZODONE HYDROCHLORIDE 50 MG/1
TABLET ORAL
Qty: 30 TABLET | Refills: 0 | Status: SHIPPED | OUTPATIENT
Start: 2020-12-22 | End: 2021-01-16

## 2021-01-07 ENCOUNTER — OFFICE VISIT (OUTPATIENT)
Dept: OBGYN CLINIC | Facility: CLINIC | Age: 66
End: 2021-01-07
Payer: MEDICARE

## 2021-01-07 ENCOUNTER — APPOINTMENT (OUTPATIENT)
Dept: RADIOLOGY | Facility: AMBULARY SURGERY CENTER | Age: 66
End: 2021-01-07
Attending: ORTHOPAEDIC SURGERY
Payer: MEDICARE

## 2021-01-07 DIAGNOSIS — Z96.642 STATUS POST LEFT HIP REPLACEMENT: Primary | ICD-10-CM

## 2021-01-07 DIAGNOSIS — Z96.642 STATUS POST LEFT HIP REPLACEMENT: ICD-10-CM

## 2021-01-07 PROCEDURE — 73502 X-RAY EXAM HIP UNI 2-3 VIEWS: CPT

## 2021-01-07 PROCEDURE — 99212 OFFICE O/P EST SF 10 MIN: CPT | Performed by: ORTHOPAEDIC SURGERY
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
## 2021-01-09 ENCOUNTER — OFFICE VISIT (OUTPATIENT)
Dept: URGENT CARE | Facility: MEDICAL CENTER | Age: 66
End: 2021-01-09
Payer: MEDICARE

## 2021-01-09 VITALS
OXYGEN SATURATION: 100 % | HEART RATE: 88 BPM | SYSTOLIC BLOOD PRESSURE: 144 MMHG | DIASTOLIC BLOOD PRESSURE: 83 MMHG | TEMPERATURE: 98.5 F | WEIGHT: 138 LBS | HEIGHT: 64 IN | BODY MASS INDEX: 23.56 KG/M2 | RESPIRATION RATE: 18 BRPM

## 2021-01-09 DIAGNOSIS — N39.0 ACUTE UTI: Primary | ICD-10-CM

## 2021-01-09 DIAGNOSIS — R30.0 DYSURIA: ICD-10-CM

## 2021-01-09 LAB
SL AMB  POCT GLUCOSE, UA: NORMAL
SL AMB LEUKOCYTE ESTERASE,UA: NORMAL
SL AMB POCT BILIRUBIN,UA: NORMAL
SL AMB POCT BLOOD,UA: NORMAL
SL AMB POCT CLARITY,UA: CLEAR
SL AMB POCT COLOR,UA: YELLOW
SL AMB POCT KETONES,UA: NORMAL
SL AMB POCT NITRITE,UA: NORMAL
SL AMB POCT PH,UA: 5
SL AMB POCT SPECIFIC GRAVITY,UA: 1
SL AMB POCT URINE PROTEIN: NORMAL
SL AMB POCT UROBILINOGEN: 0.2

## 2021-01-09 PROCEDURE — 87086 URINE CULTURE/COLONY COUNT: CPT | Performed by: PHYSICIAN ASSISTANT

## 2021-01-09 PROCEDURE — 99213 OFFICE O/P EST LOW 20 MIN: CPT | Performed by: PHYSICIAN ASSISTANT

## 2021-01-09 PROCEDURE — 81002 URINALYSIS NONAUTO W/O SCOPE: CPT | Performed by: PHYSICIAN ASSISTANT

## 2021-01-09 PROCEDURE — G0463 HOSPITAL OUTPT CLINIC VISIT: HCPCS | Performed by: PHYSICIAN ASSISTANT

## 2021-01-09 RX ORDER — SULFAMETHOXAZOLE AND TRIMETHOPRIM 800; 160 MG/1; MG/1
1 TABLET ORAL EVERY 12 HOURS SCHEDULED
Qty: 10 TABLET | Refills: 0 | Status: SHIPPED | OUTPATIENT
Start: 2021-01-09 | End: 2021-01-14
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
## 2021-01-09 NOTE — PATIENT INSTRUCTIONS
Take all antibiotics as prescribed  Drink lots of clear fluids  Call us in 2 days for urine culture results  Follow-up with PCP, OB/Gyn or Urology in the next 2-3 days for reexamination and to ensure resolution of symptoms     Go to ER if worsening symptoms, fevers, back pain, abdominal pain, nausea, vomiting or other concerning symptoms

## 2021-01-11 LAB — BACTERIA UR CULT: NORMAL

## 2021-01-14 DIAGNOSIS — F51.01 PRIMARY INSOMNIA: ICD-10-CM

## 2021-01-15 DIAGNOSIS — K21.9 GASTROESOPHAGEAL REFLUX DISEASE: ICD-10-CM

## 2021-01-16 RX ORDER — TRAZODONE HYDROCHLORIDE 50 MG/1
TABLET ORAL
Qty: 30 TABLET | Refills: 0 | Status: SHIPPED | OUTPATIENT
Start: 2021-01-16 | End: 2021-02-09

## 2021-01-16 RX ORDER — OMEPRAZOLE 20 MG/1
CAPSULE, DELAYED RELEASE ORAL
Qty: 90 CAPSULE | Refills: 1 | Status: SHIPPED | OUTPATIENT
Start: 2021-01-16 | End: 2021-07-08

## 2021-02-08 DIAGNOSIS — F51.01 PRIMARY INSOMNIA: ICD-10-CM

## 2021-02-09 RX ORDER — TRAZODONE HYDROCHLORIDE 50 MG/1
TABLET ORAL
Qty: 30 TABLET | Refills: 0 | Status: SHIPPED | OUTPATIENT
Start: 2021-02-09 | End: 2021-02-18

## 2021-02-13 DIAGNOSIS — Z23 ENCOUNTER FOR IMMUNIZATION: ICD-10-CM

## 2021-02-17 DIAGNOSIS — F51.01 PRIMARY INSOMNIA: ICD-10-CM

## 2021-02-18 RX ORDER — TRAZODONE HYDROCHLORIDE 50 MG/1
TABLET ORAL
Qty: 90 TABLET | Refills: 1 | Status: SHIPPED | OUTPATIENT
Start: 2021-02-18 | End: 2021-05-06

## 2021-03-30 ENCOUNTER — TELEPHONE (OUTPATIENT)
Dept: RHEUMATOLOGY | Facility: CLINIC | Age: 66
End: 2021-03-30

## 2021-03-30 NOTE — PROGRESS NOTES
Assessment and Plan:   Patient is a 58-year-old female who presents for rheumatology follow-up for osteoporosis  She did receive her Prolia injection today in the office without complication  We discussed that before next visit we will check some labs to get an updated vitamin-D and check on her calcium before the next shot  She also will be due for a DEXA in August so I gave her referral for that  We discussed that as long as the DEXA look stable or improved we will continue with Prolia  She will continue with calcium and vitamin-D at home  Plan:  Diagnoses and all orders for this visit:    Other osteoporosis without current pathological fracture  -     Basic metabolic panel  -     Vitamin D 25 hydroxy  -     DXA bone density spine hip and pelvis; Future  -     denosumab (PROLIA) subcutaneous injection 60 mg    Primary generalized (osteo)arthritis    Vitamin D deficiency  -     Basic metabolic panel  -     Vitamin D 25 hydroxy    High risk medication use  -     Basic metabolic panel  -     Vitamin D 25 hydroxy        Follow-up plan: 6 months        Rheumatic Disease Summary  1  Osteoporosis   -Established with Dr Dav Henderson- June, 2015- dx with osteoporosis with compression fracture at T12 in 2013     -Prior meds: Boniva x 6 years and Reclast x 1 dose with Dr Kim Gibson; presented off treatment for a few years  -DEXA 7/2015: T -3 7 lumbar, T -2 5 hip  -DEXA 8/1/2017: T -3 3 lumbar, unable to compare to prior   -Started on Forteo 8/2015, last dose in 8/2017  -August, 2017- DEXA showed T -1 5 at femoral neck and -3 3 at lumbar spine  -Started on Prolia 8/2017, last dose given 4/1/21, next due around 10/1/21   -DEXA 8/2/19: T -3 lumbar, significant increased BMD in lumbar and hip compared to 2017 study  -DEXA due 8/2021  2  Comorbidities: s/p right TKA, GERD, depression, OA, lumbar DDD/OA, s/p left THR      HPI  Philip Gunn is a 76 y o   female who presents for rheumatology follow-up for osteoporosis      She has been maintained on Prolia and will be due for her injection today, she is actually about 1 month late for her injection  Her last DEXA showed an improvement in her bone density  Since last visit, she did have her left hip replaced in July 2020  Patient reports she is doing well since her hip replacement in the summer  She recovered well  She has no concerns today  We discussed her DEXA will be due in August this year  She is still on calcium and vitamin-D  No other health changes since she was last here  The following portions of the patient's history were reviewed and updated as appropriate: allergies, current medications, past family history, past medical history, past social history, past surgical history and problem list     Review of Systems:   Review of Systems   Constitutional: Negative for fatigue and unexpected weight change  HENT: Negative for mouth sores  Respiratory: Negative for cough and shortness of breath  Gastrointestinal: Negative for constipation and diarrhea  Musculoskeletal: Negative for arthralgias, back pain, joint swelling and myalgias  Skin: Negative for color change and rash  Neurological: Negative for weakness  Psychiatric/Behavioral: Negative for sleep disturbance         Home Medications:    Current Outpatient Medications:     Calcium 600 MG tablet, Take 1 tablet by mouth daily, Disp: , Rfl:     Cholecalciferol (VITAMIN D) 2000 units CAPS, Take 1 tablet by mouth daily, Disp: , Rfl:     clindamycin (CLEOCIN) 300 MG capsule, TAKE 2 CAPSULES BY MOUTH 1 HOUR PRIOR TO APPOINTMENT, Disp: , Rfl:     estradiol (ESTRACE VAGINAL) 0 1 mg/g vaginal cream, Please specify directions, refills and quantity, Disp: 42 5 g, Rfl: 2    loratadine (CLARITIN) 10 mg tablet, Take 10 mg by mouth daily, Disp: , Rfl:     omeprazole (PriLOSEC) 20 mg delayed release capsule, TAKE 1 CAPSULE BY MOUTH EVERY DAY, Disp: 90 capsule, Rfl: 1    traZODone (DESYREL) 50 mg tablet, TAKE 1 TABLET BY MOUTH DAILY AT BEDTIME, Disp: 90 tablet, Rfl: 1  No current facility-administered medications for this visit  Objective:    Vitals:    04/01/21 1513   Pulse: 84   Height: 5' 4" (1 626 m)       Physical Exam  Constitutional:       General: She is not in acute distress  Appearance: She is well-developed  HENT:      Head: Normocephalic and atraumatic  Eyes:      General: Lids are normal  No scleral icterus  Conjunctiva/sclera: Conjunctivae normal    Neck:      Musculoskeletal: Neck supple  Pulmonary:      Effort: Pulmonary effort is normal  No tachypnea, accessory muscle usage or respiratory distress  Skin:     General: Skin is dry  Findings: No rash  Neurological:      Mental Status: She is alert  Psychiatric:         Behavior: Behavior normal  Behavior is cooperative             Labs:   Component      Latest Ref Rng & Units 7/9/2020   Sodium      136 - 145 mmol/L 137   Potassium      3 5 - 5 3 mmol/L 4 1   Chloride      100 - 108 mmol/L 104   CO2      21 - 32 mmol/L 27   Anion Gap      4 - 13 mmol/L 6   BUN      5 - 25 mg/dL 9   Creatinine      0 60 - 1 30 mg/dL 0 80   Glucose, Random      65 - 140 mg/dL 110   Calcium      8 3 - 10 1 mg/dL 8 8   eGFR      ml/min/1 73sq m 77

## 2021-04-01 ENCOUNTER — OFFICE VISIT (OUTPATIENT)
Dept: RHEUMATOLOGY | Facility: CLINIC | Age: 66
End: 2021-04-01
Payer: MEDICARE

## 2021-04-01 VITALS — BODY MASS INDEX: 23.69 KG/M2 | HEART RATE: 84 BPM | HEIGHT: 64 IN

## 2021-04-01 DIAGNOSIS — Z79.899 HIGH RISK MEDICATION USE: ICD-10-CM

## 2021-04-01 DIAGNOSIS — M15.0 PRIMARY GENERALIZED (OSTEO)ARTHRITIS: ICD-10-CM

## 2021-04-01 DIAGNOSIS — M81.8 OTHER OSTEOPOROSIS WITHOUT CURRENT PATHOLOGICAL FRACTURE: Primary | ICD-10-CM

## 2021-04-01 DIAGNOSIS — E55.9 VITAMIN D DEFICIENCY: ICD-10-CM

## 2021-04-01 PROCEDURE — 99214 OFFICE O/P EST MOD 30 MIN: CPT | Performed by: INTERNAL MEDICINE

## 2021-04-01 PROCEDURE — 96372 THER/PROPH/DIAG INJ SC/IM: CPT | Performed by: INTERNAL MEDICINE

## 2021-04-16 ENCOUNTER — HOSPITAL ENCOUNTER (OUTPATIENT)
Dept: RADIOLOGY | Facility: MEDICAL CENTER | Age: 66
Discharge: HOME | End: 2021-04-16
Payer: MEDICARE

## 2021-04-16 ENCOUNTER — TELEPHONE (OUTPATIENT)
Dept: FAMILY MEDICINE CLINIC | Facility: MEDICAL CENTER | Age: 66
End: 2021-04-16

## 2021-04-16 VITALS — BODY MASS INDEX: 22.36 KG/M2 | WEIGHT: 131 LBS | HEIGHT: 64 IN

## 2021-04-16 DIAGNOSIS — Z12.31 ENCOUNTER FOR SCREENING MAMMOGRAM FOR MALIGNANT NEOPLASM OF BREAST: ICD-10-CM

## 2021-04-16 PROCEDURE — 77063 BREAST TOMOSYNTHESIS BI: CPT

## 2021-04-16 PROCEDURE — 77067 SCR MAMMO BI INCL CAD: CPT

## 2021-04-16 NOTE — TELEPHONE ENCOUNTER
Pt called to ask if she is supposed to have further testing done    She saw her mammogram results in her MyChart and it said there were findings    Routed to Dr Rod Turner

## 2021-04-21 ENCOUNTER — HOSPITAL ENCOUNTER (OUTPATIENT)
Dept: ULTRASOUND IMAGING | Facility: CLINIC | Age: 66
Discharge: HOME | End: 2021-04-21
Payer: MEDICARE

## 2021-04-21 DIAGNOSIS — R92.8 ABNORMAL MAMMOGRAM: ICD-10-CM

## 2021-04-21 PROCEDURE — 76642 ULTRASOUND BREAST LIMITED: CPT

## 2021-05-06 ENCOUNTER — OFFICE VISIT (OUTPATIENT)
Dept: FAMILY MEDICINE CLINIC | Facility: MEDICAL CENTER | Age: 66
End: 2021-05-06
Payer: MEDICARE

## 2021-05-06 VITALS
BODY MASS INDEX: 21.51 KG/M2 | TEMPERATURE: 99 F | HEIGHT: 64 IN | RESPIRATION RATE: 16 BRPM | SYSTOLIC BLOOD PRESSURE: 120 MMHG | DIASTOLIC BLOOD PRESSURE: 76 MMHG | WEIGHT: 126 LBS | HEART RATE: 64 BPM

## 2021-05-06 DIAGNOSIS — Z00.00 MEDICARE ANNUAL WELLNESS VISIT, SUBSEQUENT: ICD-10-CM

## 2021-05-06 DIAGNOSIS — E78.01 FAMILIAL HYPERCHOLESTEROLEMIA: Primary | ICD-10-CM

## 2021-05-06 DIAGNOSIS — Z11.59 NEED FOR HEPATITIS C SCREENING TEST: ICD-10-CM

## 2021-05-06 DIAGNOSIS — Z12.31 SCREENING MAMMOGRAM, ENCOUNTER FOR: ICD-10-CM

## 2021-05-06 PROCEDURE — 1123F ACP DISCUSS/DSCN MKR DOCD: CPT | Performed by: FAMILY MEDICINE

## 2021-05-06 PROCEDURE — G0438 PPPS, INITIAL VISIT: HCPCS | Performed by: FAMILY MEDICINE
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
## 2021-05-07 ENCOUNTER — TELEPHONE (OUTPATIENT)
Dept: FAMILY MEDICINE CLINIC | Facility: MEDICAL CENTER | Age: 66
End: 2021-05-07

## 2021-05-07 NOTE — TELEPHONE ENCOUNTER
Ganesh Carmona is going to check with her insurance to see what is covered on her formulary, will call back with approved alternatives

## 2021-05-07 NOTE — TELEPHONE ENCOUNTER
----- Message from Jamshid Ureña MD sent at 5/7/2021 12:28 AM EDT -----  At her visit today patient stated that [facility] pharmacy tried to contact us regarding alternatives for her Premarin vaginal cream   I do not see any phone call    Please contact them and find out what topical questions are covered for her

## 2021-05-10 NOTE — TELEPHONE ENCOUNTER
S/w patient   She did not call yet, will ry Tuesday  I told her there may not be an alternative  May want to look on [facility] and see with a coupon how much it is out of pocket and coupon  no

## 2021-05-14 NOTE — TELEPHONE ENCOUNTER
Pt states there aren't any alternatives  I looked on [facility] and even with that it is $175 which is to much for her  Any further advice?

## 2021-05-26 ENCOUNTER — APPOINTMENT (OUTPATIENT)
Dept: LAB | Facility: MEDICAL CENTER | Age: 66
End: 2021-05-26
Payer: MEDICARE

## 2021-05-26 DIAGNOSIS — E78.01 FAMILIAL HYPERCHOLESTEROLEMIA: ICD-10-CM

## 2021-05-26 DIAGNOSIS — Z11.59 NEED FOR HEPATITIS C SCREENING TEST: ICD-10-CM

## 2021-05-26 LAB
ALBUMIN SERPL BCP-MCNC: 3.9 G/DL (ref 3.5–5)
ALP SERPL-CCNC: 44 U/L (ref 46–116)
ALT SERPL W P-5'-P-CCNC: 20 U/L (ref 12–78)
ANION GAP SERPL CALCULATED.3IONS-SCNC: 2 MMOL/L (ref 4–13)
AST SERPL W P-5'-P-CCNC: 12 U/L (ref 5–45)
BASOPHILS # BLD AUTO: 0.09 THOUSANDS/ΜL (ref 0–0.1)
BASOPHILS NFR BLD AUTO: 1 % (ref 0–1)
BILIRUB SERPL-MCNC: 0.47 MG/DL (ref 0.2–1)
BUN SERPL-MCNC: 11 MG/DL (ref 5–25)
CALCIUM SERPL-MCNC: 10.1 MG/DL (ref 8.3–10.1)
CHLORIDE SERPL-SCNC: 114 MMOL/L (ref 100–108)
CHOLEST SERPL-MCNC: 251 MG/DL (ref 50–200)
CO2 SERPL-SCNC: 27 MMOL/L (ref 21–32)
CREAT SERPL-MCNC: 0.91 MG/DL (ref 0.6–1.3)
EOSINOPHIL # BLD AUTO: 0.39 THOUSAND/ΜL (ref 0–0.61)
EOSINOPHIL NFR BLD AUTO: 6 % (ref 0–6)
ERYTHROCYTE [DISTWIDTH] IN BLOOD BY AUTOMATED COUNT: 12.5 % (ref 11.6–15.1)
GFR SERPL CREATININE-BSD FRML MDRD: 65 ML/MIN/1.73SQ M
GLUCOSE P FAST SERPL-MCNC: 85 MG/DL (ref 65–99)
HCT VFR BLD AUTO: 43.3 % (ref 34.8–46.1)
HCV AB SER QL: NORMAL
HDLC SERPL-MCNC: 66 MG/DL
HGB BLD-MCNC: 13.8 G/DL (ref 11.5–15.4)
IMM GRANULOCYTES # BLD AUTO: 0.02 THOUSAND/UL (ref 0–0.2)
IMM GRANULOCYTES NFR BLD AUTO: 0 % (ref 0–2)
LDLC SERPL CALC-MCNC: 166 MG/DL (ref 0–100)
LYMPHOCYTES # BLD AUTO: 2.48 THOUSANDS/ΜL (ref 0.6–4.47)
LYMPHOCYTES NFR BLD AUTO: 35 % (ref 14–44)
MCH RBC QN AUTO: 30.9 PG (ref 26.8–34.3)
MCHC RBC AUTO-ENTMCNC: 31.9 G/DL (ref 31.4–37.4)
MCV RBC AUTO: 97 FL (ref 82–98)
MONOCYTES # BLD AUTO: 0.66 THOUSAND/ΜL (ref 0.17–1.22)
MONOCYTES NFR BLD AUTO: 9 % (ref 4–12)
NEUTROPHILS # BLD AUTO: 3.43 THOUSANDS/ΜL (ref 1.85–7.62)
NEUTS SEG NFR BLD AUTO: 49 % (ref 43–75)
NONHDLC SERPL-MCNC: 185 MG/DL
NRBC BLD AUTO-RTO: 0 /100 WBCS
PLATELET # BLD AUTO: 409 THOUSANDS/UL (ref 149–390)
PMV BLD AUTO: 10.5 FL (ref 8.9–12.7)
POTASSIUM SERPL-SCNC: 4.9 MMOL/L (ref 3.5–5.3)
PROT SERPL-MCNC: 7.3 G/DL (ref 6.4–8.2)
RBC # BLD AUTO: 4.47 MILLION/UL (ref 3.81–5.12)
SODIUM SERPL-SCNC: 143 MMOL/L (ref 136–145)
TRIGL SERPL-MCNC: 96 MG/DL
TSH SERPL DL<=0.05 MIU/L-ACNC: 1.53 UIU/ML (ref 0.36–3.74)
WBC # BLD AUTO: 7.07 THOUSAND/UL (ref 4.31–10.16)

## 2021-05-26 PROCEDURE — 85025 COMPLETE CBC W/AUTO DIFF WBC: CPT

## 2021-05-26 PROCEDURE — 86803 HEPATITIS C AB TEST: CPT

## 2021-05-26 PROCEDURE — 84443 ASSAY THYROID STIM HORMONE: CPT

## 2021-05-26 PROCEDURE — 80061 LIPID PANEL: CPT

## 2021-05-26 PROCEDURE — 80053 COMPREHEN METABOLIC PANEL: CPT

## 2021-05-26 PROCEDURE — 36415 COLL VENOUS BLD VENIPUNCTURE: CPT

## 2021-05-27 ENCOUNTER — TELEPHONE (OUTPATIENT)
Dept: FAMILY MEDICINE CLINIC | Facility: MEDICAL CENTER | Age: 66
End: 2021-05-27

## 2021-05-27 NOTE — TELEPHONE ENCOUNTER
----- Message from Alejandro Vu MD sent at 5/27/2021  9:37 AM EDT -----  Notify blood work results  All good except that her cholesterol is higher than previous at  251  Needs to watch her diet carefully and repeat in 1 year  Statin not indicated at this time but may be if continues to go up

## 2021-07-07 ENCOUNTER — OFFICE VISIT (OUTPATIENT)
Dept: OBGYN CLINIC | Facility: CLINIC | Age: 66
End: 2021-07-07
Payer: MEDICARE

## 2021-07-07 VITALS
HEART RATE: 81 BPM | WEIGHT: 126 LBS | SYSTOLIC BLOOD PRESSURE: 128 MMHG | DIASTOLIC BLOOD PRESSURE: 74 MMHG | BODY MASS INDEX: 21.51 KG/M2 | HEIGHT: 64 IN

## 2021-07-07 DIAGNOSIS — Z96.642 STATUS POST LEFT HIP REPLACEMENT: Primary | ICD-10-CM

## 2021-07-07 PROCEDURE — 99213 OFFICE O/P EST LOW 20 MIN: CPT | Performed by: ORTHOPAEDIC SURGERY
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
## 2021-07-08 DIAGNOSIS — K21.9 GASTROESOPHAGEAL REFLUX DISEASE: ICD-10-CM

## 2021-07-08 RX ORDER — OMEPRAZOLE 20 MG/1
CAPSULE, DELAYED RELEASE ORAL
Qty: 90 CAPSULE | Refills: 1 | Status: SHIPPED | OUTPATIENT
Start: 2021-07-08 | End: 2022-01-03

## 2021-07-22 ENCOUNTER — TELEPHONE (OUTPATIENT)
Dept: FAMILY MEDICINE CLINIC | Facility: MEDICAL CENTER | Age: 66
End: 2021-07-22

## 2021-07-22 NOTE — TELEPHONE ENCOUNTER
Pt lm on VM for refill of Trazodone 50 mgs  She says she takes it at bedtime and hasn't needed a refill in a long time since she doesn't take it all the time   I can not find it on her inactive med list

## 2021-08-18 ENCOUNTER — APPOINTMENT (OUTPATIENT)
Dept: LAB | Facility: MEDICAL CENTER | Age: 66
End: 2021-08-18
Payer: MEDICARE

## 2021-08-18 ENCOUNTER — HOSPITAL ENCOUNTER (OUTPATIENT)
Dept: RADIOLOGY | Facility: MEDICAL CENTER | Age: 66
Discharge: HOME | End: 2021-08-18
Payer: MEDICARE

## 2021-08-18 DIAGNOSIS — M81.8 OTHER OSTEOPOROSIS WITHOUT CURRENT PATHOLOGICAL FRACTURE: ICD-10-CM

## 2021-08-18 LAB
25(OH)D3 SERPL-MCNC: 87.6 NG/ML (ref 30–100)
ANION GAP SERPL CALCULATED.3IONS-SCNC: 0 MMOL/L (ref 4–13)
BUN SERPL-MCNC: 11 MG/DL (ref 5–25)
CALCIUM SERPL-MCNC: 9.1 MG/DL (ref 8.3–10.1)
CHLORIDE SERPL-SCNC: 110 MMOL/L (ref 100–108)
CO2 SERPL-SCNC: 27 MMOL/L (ref 21–32)
CREAT SERPL-MCNC: 0.94 MG/DL (ref 0.6–1.3)
GFR SERPL CREATININE-BSD FRML MDRD: 63 ML/MIN/1.73SQ M
GLUCOSE SERPL-MCNC: 79 MG/DL (ref 65–140)
POTASSIUM SERPL-SCNC: 4.6 MMOL/L (ref 3.5–5.3)
SODIUM SERPL-SCNC: 137 MMOL/L (ref 136–145)

## 2021-08-18 PROCEDURE — 80048 BASIC METABOLIC PNL TOTAL CA: CPT | Performed by: INTERNAL MEDICINE

## 2021-08-18 PROCEDURE — 36415 COLL VENOUS BLD VENIPUNCTURE: CPT | Performed by: INTERNAL MEDICINE

## 2021-08-18 PROCEDURE — 77080 DXA BONE DENSITY AXIAL: CPT

## 2021-08-18 PROCEDURE — 82306 VITAMIN D 25 HYDROXY: CPT | Performed by: INTERNAL MEDICINE

## 2021-08-19 ENCOUNTER — HOSPITAL ENCOUNTER (OUTPATIENT)
Dept: RADIOLOGY | Facility: MEDICAL CENTER | Age: 66
Discharge: HOME | End: 2021-08-19
Payer: MEDICARE

## 2021-08-19 ENCOUNTER — TELEPHONE (OUTPATIENT)
Dept: FAMILY MEDICINE CLINIC | Facility: MEDICAL CENTER | Age: 66
End: 2021-08-19

## 2021-08-19 ENCOUNTER — OFFICE VISIT (OUTPATIENT)
Dept: FAMILY MEDICINE CLINIC | Facility: MEDICAL CENTER | Age: 66
End: 2021-08-19
Payer: MEDICARE

## 2021-08-19 VITALS
HEIGHT: 64 IN | BODY MASS INDEX: 21.34 KG/M2 | WEIGHT: 125 LBS | HEART RATE: 80 BPM | RESPIRATION RATE: 16 BRPM | TEMPERATURE: 99.6 F | DIASTOLIC BLOOD PRESSURE: 76 MMHG | SYSTOLIC BLOOD PRESSURE: 116 MMHG

## 2021-08-19 DIAGNOSIS — R39.9 URINARY SYMPTOM OR SIGN: Primary | ICD-10-CM

## 2021-08-19 DIAGNOSIS — R10.9 RIGHT FLANK PAIN: ICD-10-CM

## 2021-08-19 LAB
SL AMB  POCT GLUCOSE, UA: NORMAL
SL AMB LEUKOCYTE ESTERASE,UA: NORMAL
SL AMB POCT BILIRUBIN,UA: NORMAL
SL AMB POCT BLOOD,UA: NORMAL
SL AMB POCT KETONES,UA: NORMAL
SL AMB POCT NITRITE,UA: NORMAL
SL AMB POCT PH,UA: 7
SL AMB POCT SPECIFIC GRAVITY,UA: 1.01
SL AMB POCT URINE PROTEIN: NORMAL
SL AMB POCT UROBILINOGEN: 3.5

## 2021-08-19 PROCEDURE — 87086 URINE CULTURE/COLONY COUNT: CPT | Performed by: FAMILY MEDICINE

## 2021-08-19 PROCEDURE — 74176 CT ABD & PELVIS W/O CONTRAST: CPT

## 2021-08-19 PROCEDURE — 99213 OFFICE O/P EST LOW 20 MIN: CPT | Performed by: FAMILY MEDICINE

## 2021-08-19 PROCEDURE — 81002 URINALYSIS NONAUTO W/O SCOPE: CPT | Performed by: FAMILY MEDICINE

## 2021-08-19 PROCEDURE — G1004 CDSM NDSC: HCPCS

## 2021-08-19 RX ORDER — OXYCODONE HYDROCHLORIDE 5 MG/1
5 TABLET ORAL EVERY 4 HOURS PRN
Qty: 20 TABLET | Refills: 0 | Status: SHIPPED | OUTPATIENT
Start: 2021-08-19 | End: 2021-09-15 | Stop reason: SDUPTHER

## 2021-08-19 RX ORDER — CYCLOBENZAPRINE HCL 5 MG
5 TABLET ORAL 3 TIMES DAILY PRN
Qty: 20 TABLET | Refills: 0 | Status: SHIPPED | OUTPATIENT
Start: 2021-08-19 | End: 2021-09-02 | Stop reason: SDUPTHER

## 2021-08-19 NOTE — PROGRESS NOTES
Marilyn Back is here for right flank pain  Pain started yesterday  Severe pain  Sudden onset  Has urinary urgency  No blood  Pain is worse with urination  Spastic  Some lower abdominal   Pressure  She has  a history of kidney stones  Took Motriin last night  No known trauma or exertion      O: /76 (BP Location: Left arm, Patient Position: Sitting, Cuff Size: Adult)   Pulse 80   Temp 99 6 °F (37 6 °C)   Resp 16   Ht 5' 4" (1 626 m)   Wt 56 7 kg (125 lb)   BMI 21 46 kg/m²      Looks uncomfortable   neck no adenopathy   chest is clear   Cardiac regular rate without murmur   abdomen slight suprapubic tenderness   right CVA tenderness present     UA normal     Assessment   suspect kidney stone     Plan   Check  CT scan abdomen pelvis with renal protocol

## 2021-08-19 NOTE — TELEPHONE ENCOUNTER
Per Dr Garett Hall  Spoke to patient  CT scan showed small stone on left side, non obstructive  Nothing visualized on the side that is bothering her  I asked patient per our conversation if she would like to try a muscle relaxant to see if she will be provided any relief  She feels strongly it is not a muscle spasm but is willing to try it  She is asking for something in addition for the pain, states it is "unbearable"

## 2021-08-19 NOTE — TELEPHONE ENCOUNTER
Message left to schedule an appointment   Per our conversation, she has tried Oxycodone 5mg in the past for pain and tolerated it well

## 2021-08-19 NOTE — TELEPHONE ENCOUNTER
Pt started yesterday with what she described as a backache, but then last night it hurt to lay down and pt didn't really sleep  Pt is concerned she may have a kidney stone due to she has noticed some urinary frequency, and she said when her bladder is full the pain is "umbearable"  No fevers that she's aware of and she has felt nauseated at times, but has not vomited  When would you want to see her?

## 2021-08-20 LAB — BACTERIA UR CULT: NORMAL

## 2021-08-23 ENCOUNTER — TELEPHONE (OUTPATIENT)
Dept: FAMILY MEDICINE CLINIC | Facility: MEDICAL CENTER | Age: 66
End: 2021-08-23

## 2021-08-23 NOTE — TELEPHONE ENCOUNTER
Pt aware  Feeling better, still has occasional pain but takes a muscle relaxer and lays down and sx improve

## 2021-09-02 DIAGNOSIS — R10.9 RIGHT FLANK PAIN: ICD-10-CM

## 2021-09-03 RX ORDER — CYCLOBENZAPRINE HCL 5 MG
5 TABLET ORAL 3 TIMES DAILY PRN
Qty: 20 TABLET | Refills: 0 | Status: SHIPPED | OUTPATIENT
Start: 2021-09-03 | End: 2021-09-15 | Stop reason: SDUPTHER

## 2021-09-15 ENCOUNTER — OFFICE VISIT (OUTPATIENT)
Dept: FAMILY MEDICINE CLINIC | Facility: MEDICAL CENTER | Age: 66
End: 2021-09-15
Payer: MEDICARE

## 2021-09-15 VITALS
TEMPERATURE: 99.4 F | HEART RATE: 90 BPM | BODY MASS INDEX: 21.07 KG/M2 | DIASTOLIC BLOOD PRESSURE: 74 MMHG | WEIGHT: 123.4 LBS | SYSTOLIC BLOOD PRESSURE: 120 MMHG | HEIGHT: 64 IN | OXYGEN SATURATION: 99 %

## 2021-09-15 DIAGNOSIS — M54.31 SCIATICA OF RIGHT SIDE: ICD-10-CM

## 2021-09-15 DIAGNOSIS — Z23 IMMUNIZATION DUE: Primary | ICD-10-CM

## 2021-09-15 DIAGNOSIS — R10.9 RIGHT FLANK PAIN: ICD-10-CM

## 2021-09-15 PROCEDURE — 99213 OFFICE O/P EST LOW 20 MIN: CPT | Performed by: FAMILY MEDICINE

## 2021-09-15 PROCEDURE — 90662 IIV NO PRSV INCREASED AG IM: CPT

## 2021-09-15 PROCEDURE — G0008 ADMIN INFLUENZA VIRUS VAC: HCPCS

## 2021-09-15 RX ORDER — CYCLOBENZAPRINE HCL 5 MG
5 TABLET ORAL 3 TIMES DAILY PRN
Qty: 20 TABLET | Refills: 1 | Status: SHIPPED | OUTPATIENT
Start: 2021-09-15 | End: 2021-09-27 | Stop reason: SDUPTHER

## 2021-09-15 RX ORDER — METHYLPREDNISOLONE 4 MG/1
TABLET ORAL
Qty: 21 EACH | Refills: 0 | Status: SHIPPED | OUTPATIENT
Start: 2021-09-15 | End: 2021-11-05 | Stop reason: ALTCHOICE

## 2021-09-15 RX ORDER — OXYCODONE HYDROCHLORIDE 5 MG/1
5 TABLET ORAL EVERY 4 HOURS PRN
Qty: 30 TABLET | Refills: 0 | Status: SHIPPED | OUTPATIENT
Start: 2021-09-15 | End: 2021-10-05 | Stop reason: SDUPTHER

## 2021-09-15 NOTE — PROGRESS NOTES
See notes 8/19  She was seen for right flank pain  CT showed a 3 mm stone  Passed the kidney stone   About a week later  This pain is completely resolved  She now complains of pain in her right lower back and buttock and radiating  down her right leg below the knee  Has some numbness and tingling  Worse if prolonged sitting, sleeping  Tried  Flexeril and Vicodin which helped  She saw pain management many years ago for low back pain which was largely due to compression fracture  O: /74 (BP Location: Left arm, Patient Position: Sitting, Cuff Size: Adult)   Pulse 90   Temp 99 4 °F (37 4 °C)   Ht 5' 4" (1 626 m)   Wt 56 kg (123 lb 6 4 oz)   SpO2 99%   BMI 21 18 kg/m²   Appears uncomfortable  Back   Some tenderness over sacral area  No spinal tenderness  Reflexes and motor exam are normal in lower extremities   positive straight leg raising at 30 degrees  on the right lower extremity    Assessment  Sciatica    Plan   Rx for steroids  Renew the Vicodin and Flexeril which she already has  Rest  Ice/heat   Call if no better

## 2021-09-27 ENCOUNTER — OFFICE VISIT (OUTPATIENT)
Dept: FAMILY MEDICINE CLINIC | Facility: MEDICAL CENTER | Age: 66
End: 2021-09-27
Payer: MEDICARE

## 2021-09-27 VITALS
HEART RATE: 110 BPM | WEIGHT: 121 LBS | RESPIRATION RATE: 16 BRPM | TEMPERATURE: 100.4 F | SYSTOLIC BLOOD PRESSURE: 138 MMHG | BODY MASS INDEX: 20.66 KG/M2 | DIASTOLIC BLOOD PRESSURE: 78 MMHG | HEIGHT: 64 IN

## 2021-09-27 DIAGNOSIS — M54.9 BACK PAIN, UNSPECIFIED BACK LOCATION, UNSPECIFIED BACK PAIN LATERALITY, UNSPECIFIED CHRONICITY: Primary | ICD-10-CM

## 2021-09-27 DIAGNOSIS — M54.31 SCIATICA OF RIGHT SIDE: ICD-10-CM

## 2021-09-27 DIAGNOSIS — R10.9 RIGHT FLANK PAIN: ICD-10-CM

## 2021-09-27 LAB
SL AMB  POCT GLUCOSE, UA: NORMAL
SL AMB LEUKOCYTE ESTERASE,UA: NORMAL
SL AMB POCT BILIRUBIN,UA: NORMAL
SL AMB POCT BLOOD,UA: NORMAL
SL AMB POCT CLARITY,UA: CLEAR
SL AMB POCT COLOR,UA: CLEAR
SL AMB POCT KETONES,UA: NORMAL
SL AMB POCT NITRITE,UA: NORMAL
SL AMB POCT PH,UA: 7.5
SL AMB POCT SPECIFIC GRAVITY,UA: 1.01
SL AMB POCT URINE PROTEIN: NORMAL
SL AMB POCT UROBILINOGEN: NORMAL

## 2021-09-27 PROCEDURE — 81002 URINALYSIS NONAUTO W/O SCOPE: CPT | Performed by: NURSE PRACTITIONER

## 2021-09-27 PROCEDURE — 99213 OFFICE O/P EST LOW 20 MIN: CPT | Performed by: NURSE PRACTITIONER

## 2021-09-27 RX ORDER — NAPROXEN 500 MG/1
500 TABLET ORAL 2 TIMES DAILY WITH MEALS
Qty: 60 TABLET | Refills: 5 | Status: SHIPPED | OUTPATIENT
Start: 2021-09-27 | End: 2021-11-05 | Stop reason: ALTCHOICE

## 2021-09-27 RX ORDER — CYCLOBENZAPRINE HCL 5 MG
5 TABLET ORAL 2 TIMES DAILY PRN
Qty: 15 TABLET | Refills: 0 | Status: SHIPPED | OUTPATIENT
Start: 2021-09-27 | End: 2021-10-05 | Stop reason: SDUPTHER

## 2021-09-27 NOTE — ASSESSMENT & PLAN NOTE
Patient has right buttocks to right thigh sciatica 8/10 pain  Currently ordered for anti-inflammatories a naproxen 500 mg p o  B i d  p r n  And Flexeril 5 mg p o  B i d  P r n  Muscle spasm  Patient also instructed on use of a weight belt, not to lift more than 10 lb, and rice therapy
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
## 2021-09-27 NOTE — ASSESSMENT & PLAN NOTE
Patient does not report any right flank pain at this time  Urine is negative for blood, protein or sugar  Patient reports that she had a kidney stone a few months prior but had passed it

## 2021-09-27 NOTE — ASSESSMENT & PLAN NOTE
Patient's BMI within normal limits of 20 77 kg/M2  Patient instructed on maintenance of proper diet, exercise and nutrition

## 2021-09-27 NOTE — ASSESSMENT & PLAN NOTE
Patient has scoliosis of the right side of her back  Currently provided back care information as well as treatment  Patient has right buttocks to right thigh sciatica 8/10 pain  Currently ordered for anti-inflammatories a naproxen 500 mg p o  B i d  p r n  And Flexeril 5 mg p o  B i d  P r n  Muscle spasm  Patient also instructed on use of a weight belt, not to lift more than 10 lb, and rice therapy

## 2021-09-27 NOTE — PATIENT INSTRUCTIONS
Back Pain   WHAT YOU NEED TO KNOW:   What do I need to know about back pain? Back pain is common  You may have back pain and muscle spasms  You may feel sore or stiff on one or both sides of your back  The pain may spread to your lower body  Conditions that affect the spine, joints, or muscles can cause back pain  These may include arthritis, spinal stenosis (narrowing of the spinal column), muscle tension, or breakdown of the spinal discs  What increases my risk for back pain? · Repeated bending, lifting, or twisting, or lifting heavy items    · Injury from a fall or accident    · Lack of regular physical activity     · Obesity or pregnancy     · Smoking    · Aging    · Driving, sitting, or standing for long periods    · Bad posture while sitting or standing    How is back pain diagnosed? Your healthcare provider will ask if you have any medical conditions  He or she may ask if you have a history of back pain and how it started  He or she may watch you stand and walk, and check your range of motion  Show him or her where you feel pain and what makes it better or worse  Describe the pain, how bad it is, and how long it lasts  Tell your provider if your pain worsens at night or when you lie on your back  How is back pain treated? · Medicines:      ? NSAIDs , such as ibuprofen, help decrease swelling, pain, and fever  This medicine is available with or without a doctor's order  NSAIDs may be given as a pill or as a cream that is applied to your back  NSAIDs can cause stomach bleeding or kidney problems in certain people  If you take blood thinner medicine, always ask your healthcare provider if NSAIDs are safe for you  Always read the medicine label and follow directions  ? Acetaminophen  decreases pain and fever  It is available without a doctor's order  Ask how much to take and how often to take it  Follow directions   Read the labels of all other medicines you are using to see if they also contain acetaminophen, or ask your doctor or pharmacist  Acetaminophen can cause liver damage if not taken correctly  Do not use more than 4 grams (4,000 milligrams) total of acetaminophen in one day  ? Muscle relaxers  help decrease muscle spasms and back pain  · Acupressure  may be recommended to decrease pain and improve movement  Acupressure is pressure or localized massage to the area of your back pain  · A transcutaneous electrical nerve stimulation (TENS) unit  is a portable, pocket-sized, battery-powered device that attaches to your skin  It is usually placed over the area of pain  It uses mild, safe electrical signals to help control pain  How do I manage my back pain? · Apply ice  on your back for 15 to 20 minutes every hour or as directed  Use an ice pack, or put crushed ice in a plastic bag  Cover it with a towel before you apply it to your skin  Ice helps prevent tissue damage and decreases pain  · Apply heat  on your back for 20 to 30 minutes every 2 hours for as many days as directed  Heat helps decrease pain and muscle spasms  · Stay active  as much as you can without causing more pain  Bed rest could make your back pain worse  Avoid heavy lifting until your pain is gone  · Go to physical therapy as directed  A physical therapist can teach you exercises to help improve movement and strength, and to decrease pain  Call your local emergency number (911 in the [facility]) if:   · You have severe back pain with chest pain  · You cannot control your urine or bowel movements  · Your pain becomes so severe that you cannot walk  When should I seek immediate care? · You have pain, numbness, or weakness in one or both legs  · You have severe back pain, nausea, and vomiting  · You have severe back pain that spreads to your side or genital area  When should I call my doctor? · You have back pain that does not get better with rest and pain medicine      · You have a fever     · You have pain that worsens when you are on your back or when you rest     · You have pain that worsens when you cough or sneeze  · You lose weight without trying  · You have questions or concerns about your condition or care  CARE AGREEMENT:   You have the right to help plan your care  Learn about your health condition and how it may be treated  Discuss treatment options with your healthcare providers to decide what care you want to receive  You always have the right to refuse treatment  The above information is an  only  It is not intended as medical advice for individual conditions or treatments  Talk to your doctor, nurse or pharmacist before following any medical regimen to see if it is safe and effective for you  © Copyright [employer] 2021 Information is for End User's use only and may not be sold, redistributed or otherwise used for commercial purposes  All illustrations and images included in CareNotes® are the copyrighted property of [employer]  or [address] [place] [address]:   What is a kidney stone? Kidney stones form in the urinary system when the water and waste in your urine are out of balance  When this happens, certain types of waste crystals separate from the urine  The crystals build up and form kidney stones  Kidney stones can be made of uric acid, calcium, phosphate, or oxalate crystals  You may have more than one kidney stone  What increases my risk for kidney stones? · Not drinking enough liquids (especially water) each day    · Having urinary tract infections often    · Too much of certain foods, such as meat, salt, nuts, and chocolate    · Obesity    · Certain medicines, such as diuretics, steroids, and antacids    · A family history of kidney stones    · Being born with a kidney or bowel disorder    What are the signs and symptoms of kidney stones?    · Pain in the middle of your back that moves across to your side or that may spread to your groin    · Nausea and vomiting    · Urge to urinate often, burning feeling when you urinate, or pink or red urine    · Tenderness in your lower back, side, or stomach    How are kidney stones diagnosed? Your healthcare provider will ask about your health and usual foods  He or she may refer you to a urologist  Clark Goldmann may need tests to find out what type of kidney stones you have  Tests can show the size of your kidney stones and where they are in your urinary system  You may need more than one of the following:  · Urine tests  may show if you have blood in your urine  They may also show high amounts of the substances that form kidney stones, such as uric acid  · Blood tests  show how well your kidneys are working  They may also be used to check the levels of calcium or uric acid in your blood  · X-ray or ultrasound pictures  may be taken of your kidneys, bladder, and ureters  You may be given contrast liquid before an x-ray to help these show up better in the pictures  You may need to have more than one x-ray  Tell the healthcare provider if you have ever had an allergic reaction to contrast liquid  How are kidney stones treated? · NSAIDs , such as ibuprofen, help decrease swelling, pain, and fever  This medicine is available with or without a doctor's order  NSAIDs can cause stomach bleeding or kidney problems in certain people  If you take blood thinner medicine, always ask your healthcare provider if NSAIDs are safe for you  Always read the medicine label and follow directions  · Acetaminophen  decreases pain and fever  It is available without a doctor's order  Ask how much to take and how often to take it  Follow directions  Read the labels of all other medicines you are using to see if they also contain acetaminophen, or ask your doctor or pharmacist  Acetaminophen can cause liver damage if not taken correctly   Do not use more than 4 grams (4,000 milligrams) total of acetaminophen in one day  · Prescription pain medicine  may be given  Ask your healthcare provider how to take this medicine safely  Some prescription pain medicines contain acetaminophen  Do not take other medicines that contain acetaminophen without talking to your healthcare provider  Too much acetaminophen may cause liver damage  Prescription pain medicine may cause constipation  Ask your healthcare provider how to prevent or treat constipation  · Medicines  to balance your electrolytes may be needed  · A procedure or surgery  to remove the kidney stones may be needed if they do not pass on their own  Your treatment will depend on the size and location of your kidney stones  What can I do to manage kidney stones? · Drink more liquids  Your healthcare provider may tell you to drink at least 8 to 12 (eight-ounce) cups of liquids each day  This helps flush out the kidney stones when you urinate  Water is the best liquid to drink  · Strain your urine every time you go to the bathroom  Urinate through a strainer or a piece of thin cloth to catch the stones  Take the stones to your healthcare provider so they can be sent to the lab for tests  This will help your healthcare providers plan the best treatment for you  · Eat a variety of healthy foods  Healthy foods include fruits, vegetables, whole-grain breads, low-fat dairy products, beans, and fish  You may need to limit how much sodium (salt) or protein you eat  Ask for information about the best foods for you  · Be physically active as directed  Your stones may pass more easily if you stay active  Physical activity can also help you manage your weight  Ask about the best activities for you  When should I seek immediate care? · You are vomiting and it is not relieved with medicine  When should I call my doctor? · You have a fever  · You have trouble urinating  · You see blood in your urine      · You have severe pain  · You have any questions or concerns about your condition or care  CARE AGREEMENT:   You have the right to help plan your care  Learn about your health condition and how it may be treated  Discuss treatment options with your healthcare providers to decide what care you want to receive  You always have the right to refuse treatment  The above information is an  only  It is not intended as medical advice for individual conditions or treatments  Talk to your doctor, nurse or pharmacist before following any medical regimen to see if it is safe and effective for you  © Copyright [employer] 2021 Information is for End User's use only and may not be sold, redistributed or otherwise used for commercial purposes  All illustrations and images included in CareNotes® are the copyrighted property of [employer]  or Manju Mason  R I C E  Treatment   WHAT YOU NEED TO KNOW:   What is R I C E  treatment?  R I C E  treatment is a 4-step process used to decrease swelling and pain caused by an injury  R I C E  stands for rest, ice, compression, and elevation  R I C E  should be done within 24 to 48 hours after an injury  How do I use R I C E  treatment? · Rest  your injured area as directed  You may need to stop using, or keep weight off, the injury for 48 hours or longer  Your healthcare provider may recommend crutches or another device  Return to your usual activities as directed  · Apply ice  on your injured area for 15 to 20 minutes every 4 hours or as directed  Use an ice pack, or put crushed ice in a plastic bag  Cover it with a towel  Ice helps prevent tissue damage and decreases swelling and pain  · Compress , or keep pressure on, the injured area  Compression will help decrease swelling and support the injured area  Use an elastic bandage, air stirrup, splint, or sling as directed   If you use an elastic bandage to wrap your injured area, make sure the bandage is not too tight  · Elevate  the injured area above the level of your heart as often as you can  This will help decrease swelling and pain  Prop the injured area on pillows or blankets to keep it elevated comfortably  When should I seek immediate care? · Your pain is severe  · You have severe swelling or deformity  · You have numbness in the injured area  When should I contact my healthcare provider? · Your pain and swelling does not go away after a few days  · You have questions or concerns about your condition or care  CARE AGREEMENT:   You have the right to help plan your care  Learn about your health condition and how it may be treated  Discuss treatment options with your healthcare providers to decide what care you want to receive  You always have the right to refuse treatment  The above information is an  only  It is not intended as medical advice for individual conditions or treatments  Talk to your doctor, nurse or pharmacist before following any medical regimen to see if it is safe and effective for you  © Copyright [employer] 2021 Information is for End User's use only and may not be sold, redistributed or otherwise used for commercial purposes   All illustrations and images included in CareNotes® are the copyrighted property of A D A M [employer] Inc  or [address] [place] [address]

## 2021-10-01 ENCOUNTER — TELEPHONE (OUTPATIENT)
Dept: OBGYN CLINIC | Facility: HOSPITAL | Age: 66
End: 2021-10-01

## 2021-10-05 ENCOUNTER — TELEPHONE (OUTPATIENT)
Dept: FAMILY MEDICINE CLINIC | Facility: MEDICAL CENTER | Age: 66
End: 2021-10-05

## 2021-10-05 ENCOUNTER — OFFICE VISIT (OUTPATIENT)
Dept: FAMILY MEDICINE CLINIC | Facility: MEDICAL CENTER | Age: 66
End: 2021-10-05
Payer: MEDICARE

## 2021-10-05 VITALS
HEIGHT: 64 IN | WEIGHT: 119.8 LBS | BODY MASS INDEX: 20.45 KG/M2 | HEART RATE: 58 BPM | OXYGEN SATURATION: 100 % | SYSTOLIC BLOOD PRESSURE: 128 MMHG | DIASTOLIC BLOOD PRESSURE: 78 MMHG | TEMPERATURE: 99.3 F

## 2021-10-05 DIAGNOSIS — R10.9 RIGHT FLANK PAIN: ICD-10-CM

## 2021-10-05 DIAGNOSIS — M54.31 SCIATICA OF RIGHT SIDE: Primary | ICD-10-CM

## 2021-10-05 PROCEDURE — 99213 OFFICE O/P EST LOW 20 MIN: CPT | Performed by: FAMILY MEDICINE

## 2021-10-05 RX ORDER — CYCLOBENZAPRINE HCL 5 MG
5 TABLET ORAL 2 TIMES DAILY PRN
Qty: 30 TABLET | Refills: 0 | Status: SHIPPED | OUTPATIENT
Start: 2021-10-05 | End: 2022-05-09 | Stop reason: ALTCHOICE

## 2021-10-05 RX ORDER — OXYCODONE HYDROCHLORIDE 5 MG/1
5 TABLET ORAL EVERY 4 HOURS PRN
Qty: 30 TABLET | Refills: 0 | Status: SHIPPED | OUTPATIENT
Start: 2021-10-05 | End: 2021-10-17 | Stop reason: SDUPTHER

## 2021-10-07 ENCOUNTER — OFFICE VISIT (OUTPATIENT)
Dept: RHEUMATOLOGY | Facility: CLINIC | Age: 66
End: 2021-10-07
Payer: MEDICARE

## 2021-10-07 DIAGNOSIS — M81.8 OTHER OSTEOPOROSIS WITHOUT CURRENT PATHOLOGICAL FRACTURE: Primary | ICD-10-CM

## 2021-10-07 PROCEDURE — 99211 OFF/OP EST MAY X REQ PHY/QHP: CPT | Performed by: INTERNAL MEDICINE

## 2021-10-07 PROCEDURE — 96372 THER/PROPH/DIAG INJ SC/IM: CPT | Performed by: INTERNAL MEDICINE

## 2021-10-17 DIAGNOSIS — R10.9 RIGHT FLANK PAIN: ICD-10-CM

## 2021-10-17 DIAGNOSIS — M54.31 SCIATICA OF RIGHT SIDE: ICD-10-CM

## 2021-10-17 DIAGNOSIS — F51.01 PRIMARY INSOMNIA: ICD-10-CM

## 2021-10-19 ENCOUNTER — TELEPHONE (OUTPATIENT)
Dept: FAMILY MEDICINE CLINIC | Facility: MEDICAL CENTER | Age: 66
End: 2021-10-19

## 2021-10-19 RX ORDER — OXYCODONE HYDROCHLORIDE 5 MG/1
5 TABLET ORAL EVERY 4 HOURS PRN
Qty: 30 TABLET | Refills: 0 | Status: SHIPPED | OUTPATIENT
Start: 2021-10-19 | End: 2022-05-09 | Stop reason: ALTCHOICE

## 2021-10-19 RX ORDER — TRAZODONE HYDROCHLORIDE 50 MG/1
TABLET ORAL
Qty: 90 TABLET | Refills: 1 | Status: SHIPPED | OUTPATIENT
Start: 2021-10-19 | End: 2022-03-11 | Stop reason: SDUPTHER

## 2021-10-21 ENCOUNTER — HOSPITAL ENCOUNTER (OUTPATIENT)
Dept: MRI IMAGING | Facility: HOSPITAL | Age: 66
Discharge: HOME | End: 2021-10-21
Payer: MEDICARE

## 2021-10-21 DIAGNOSIS — M54.31 SCIATICA OF RIGHT SIDE: ICD-10-CM

## 2021-10-21 PROCEDURE — 72148 MRI LUMBAR SPINE W/O DYE: CPT

## 2021-10-21 PROCEDURE — G1004 CDSM NDSC: HCPCS

## 2021-10-26 ENCOUNTER — TELEPHONE (OUTPATIENT)
Dept: FAMILY MEDICINE CLINIC | Facility: MEDICAL CENTER | Age: 66
End: 2021-10-26

## 2021-10-28 ENCOUNTER — TELEPHONE (OUTPATIENT)
Dept: FAMILY MEDICINE CLINIC | Facility: MEDICAL CENTER | Age: 66
End: 2021-10-28

## 2021-10-28 DIAGNOSIS — M48.061 SPINAL STENOSIS OF LUMBAR REGION, UNSPECIFIED WHETHER NEUROGENIC CLAUDICATION PRESENT: Primary | ICD-10-CM

## 2021-10-28 DIAGNOSIS — M51.36 LUMBAR DEGENERATIVE DISC DISEASE: ICD-10-CM

## 2021-11-02 ENCOUNTER — CONSULT (OUTPATIENT)
Dept: NEUROSURGERY | Facility: CLINIC | Age: 66
End: 2021-11-02
Payer: MEDICARE

## 2021-11-02 VITALS
DIASTOLIC BLOOD PRESSURE: 68 MMHG | RESPIRATION RATE: 17 BRPM | WEIGHT: 116 LBS | SYSTOLIC BLOOD PRESSURE: 108 MMHG | BODY MASS INDEX: 19.81 KG/M2 | HEIGHT: 64 IN | HEART RATE: 80 BPM | TEMPERATURE: 98.1 F

## 2021-11-02 DIAGNOSIS — M48.061 SPINAL STENOSIS OF LUMBAR REGION, UNSPECIFIED WHETHER NEUROGENIC CLAUDICATION PRESENT: ICD-10-CM

## 2021-11-02 DIAGNOSIS — M51.36 LUMBAR DEGENERATIVE DISC DISEASE: ICD-10-CM

## 2021-11-02 PROCEDURE — 99203 OFFICE O/P NEW LOW 30 MIN: CPT | Performed by: NEUROLOGICAL SURGERY

## 2021-11-02 RX ORDER — DENOSUMAB 60 MG/ML
60 INJECTION SUBCUTANEOUS ONCE
COMMUNITY

## 2021-11-10 ENCOUNTER — ANNUAL EXAM (OUTPATIENT)
Dept: FAMILY MEDICINE CLINIC | Facility: MEDICAL CENTER | Age: 66
End: 2021-11-10
Payer: MEDICARE

## 2021-11-10 VITALS
HEIGHT: 64 IN | OXYGEN SATURATION: 97 % | HEART RATE: 74 BPM | TEMPERATURE: 99 F | DIASTOLIC BLOOD PRESSURE: 72 MMHG | BODY MASS INDEX: 19.63 KG/M2 | WEIGHT: 115 LBS | SYSTOLIC BLOOD PRESSURE: 110 MMHG

## 2021-11-10 DIAGNOSIS — B37.83 PERLECHE WITH CANDIDIASIS: ICD-10-CM

## 2021-11-10 DIAGNOSIS — M54.50 CHRONIC BILATERAL LOW BACK PAIN, UNSPECIFIED WHETHER SCIATICA PRESENT: ICD-10-CM

## 2021-11-10 DIAGNOSIS — G89.29 CHRONIC BILATERAL LOW BACK PAIN, UNSPECIFIED WHETHER SCIATICA PRESENT: ICD-10-CM

## 2021-11-10 DIAGNOSIS — Z12.4 PAP SMEAR FOR CERVICAL CANCER SCREENING: ICD-10-CM

## 2021-11-10 DIAGNOSIS — N94.10 FEMALE DYSPAREUNIA: ICD-10-CM

## 2021-11-10 DIAGNOSIS — M81.0 OSTEOPOROSIS WITHOUT CURRENT PATHOLOGICAL FRACTURE, UNSPECIFIED OSTEOPOROSIS TYPE: ICD-10-CM

## 2021-11-10 DIAGNOSIS — L98.9 SKIN LESION OF FACE: Primary | ICD-10-CM

## 2021-11-10 PROCEDURE — G0101 CA SCREEN;PELVIC/BREAST EXAM: HCPCS | Performed by: FAMILY MEDICINE

## 2021-11-10 PROCEDURE — 99214 OFFICE O/P EST MOD 30 MIN: CPT | Performed by: FAMILY MEDICINE

## 2021-11-10 RX ORDER — NYSTATIN 100000 U/G
CREAM TOPICAL 2 TIMES DAILY
Qty: 30 G | Refills: 0 | Status: SHIPPED | OUTPATIENT
Start: 2021-11-10 | End: 2022-05-09 | Stop reason: ALTCHOICE

## 2021-11-17 ENCOUNTER — EVALUATION (OUTPATIENT)
Dept: PHYSICAL THERAPY | Facility: REHABILITATION | Age: 66
End: 2021-11-17
Payer: MEDICARE

## 2021-11-17 DIAGNOSIS — M51.36 LUMBAR DEGENERATIVE DISC DISEASE: Primary | ICD-10-CM

## 2021-11-17 PROCEDURE — 97110 THERAPEUTIC EXERCISES: CPT | Performed by: PHYSICAL THERAPIST

## 2021-11-17 PROCEDURE — 97161 PT EVAL LOW COMPLEX 20 MIN: CPT | Performed by: PHYSICAL THERAPIST

## 2021-12-03 ENCOUNTER — OFFICE VISIT (OUTPATIENT)
Dept: PHYSICAL THERAPY | Facility: REHABILITATION | Age: 66
End: 2021-12-03
Payer: MEDICARE

## 2021-12-03 DIAGNOSIS — M51.36 LUMBAR DEGENERATIVE DISC DISEASE: Primary | ICD-10-CM

## 2021-12-03 PROCEDURE — 97110 THERAPEUTIC EXERCISES: CPT | Performed by: PHYSICAL THERAPIST

## 2021-12-03 PROCEDURE — 97112 NEUROMUSCULAR REEDUCATION: CPT | Performed by: PHYSICAL THERAPIST

## 2021-12-10 ENCOUNTER — APPOINTMENT (OUTPATIENT)
Dept: PHYSICAL THERAPY | Facility: REHABILITATION | Age: 66
End: 2021-12-10
Payer: MEDICARE

## 2021-12-10 ENCOUNTER — TELEPHONE (OUTPATIENT)
Dept: FAMILY MEDICINE CLINIC | Facility: MEDICAL CENTER | Age: 66
End: 2021-12-10

## 2021-12-10 DIAGNOSIS — Z20.822 EXPOSURE TO COVID-19 VIRUS: Primary | ICD-10-CM

## 2021-12-13 PROCEDURE — U0003 INFECTIOUS AGENT DETECTION BY NUCLEIC ACID (DNA OR RNA); SEVERE ACUTE RESPIRATORY SYNDROME CORONAVIRUS 2 (SARS-COV-2) (CORONAVIRUS DISEASE [COVID-19]), AMPLIFIED PROBE TECHNIQUE, MAKING USE OF HIGH THROUGHPUT TECHNOLOGIES AS DESCRIBED BY CMS-2020-01-R: HCPCS | Performed by: FAMILY MEDICINE

## 2021-12-13 PROCEDURE — U0005 INFEC AGEN DETEC AMPLI PROBE: HCPCS | Performed by: FAMILY MEDICINE

## 2021-12-17 ENCOUNTER — OFFICE VISIT (OUTPATIENT)
Dept: PHYSICAL THERAPY | Facility: REHABILITATION | Age: 66
End: 2021-12-17
Payer: MEDICARE

## 2021-12-17 DIAGNOSIS — M51.36 LUMBAR DEGENERATIVE DISC DISEASE: Primary | ICD-10-CM

## 2021-12-17 PROCEDURE — 97110 THERAPEUTIC EXERCISES: CPT

## 2021-12-17 PROCEDURE — 97112 NEUROMUSCULAR REEDUCATION: CPT

## 2022-01-02 DIAGNOSIS — K21.9 GASTROESOPHAGEAL REFLUX DISEASE: ICD-10-CM

## 2022-01-03 RX ORDER — OMEPRAZOLE 20 MG/1
CAPSULE, DELAYED RELEASE ORAL
Qty: 90 CAPSULE | Refills: 1 | Status: SHIPPED | OUTPATIENT
Start: 2022-01-03 | End: 2022-03-11 | Stop reason: SDUPTHER

## 2022-01-07 ENCOUNTER — APPOINTMENT (OUTPATIENT)
Dept: PHYSICAL THERAPY | Facility: REHABILITATION | Age: 67
End: 2022-01-07
Payer: MEDICARE

## 2022-01-07 NOTE — TELEPHONE ENCOUNTER
Contacted by  Breast "[employer] [facility]" Solaraze Counseling:  I discussed with the patient the risks of Solaraze including but not limited to erythema, scaling, itching, weeping, crusting, and pain.

## 2022-01-14 ENCOUNTER — EVALUATION (OUTPATIENT)
Dept: PHYSICAL THERAPY | Facility: REHABILITATION | Age: 67
End: 2022-01-14
Payer: MEDICARE

## 2022-01-14 DIAGNOSIS — M51.36 LUMBAR DEGENERATIVE DISC DISEASE: Primary | ICD-10-CM

## 2022-01-14 PROCEDURE — 97110 THERAPEUTIC EXERCISES: CPT | Performed by: PHYSICAL THERAPIST

## 2022-01-14 NOTE — PROGRESS NOTES
PT Re-Evaluation     Today's date: 2022  Patient name: Manolo Mcgovern  : 1955  MRN: [id number]  Referring provider: Miesha Quintana MD  Dx:   Encounter Diagnosis     ICD-10-CM    1  Lumbar degenerative disc disease  M51 [id number]         Updated measurements and functional status taken this session  Updated HEP issued to pt  Assessment  Assessment details: Pt has progressed very well, demonstrating improvement in LE flexibility, hip/core strength, and function with an abolishment of pain since starting PT  Pt feels comfortable being d/c to ongoing HEP at this time  Updated HEP was reviewed and issued to pt  Thank you for the referral    Impairments: impaired physical strength  Understanding of Dx/Px/POC: good   Prognosis: good    Goals  Short Term:  Pt will report decreased levels of pain by at least 2 subjective ratings in 4 weeks-met  Pt will demonstrate improved HS flexibility by 25% in 4 weeks-met  Pt will demonstrate improved strength by 1/2 grade MMT in 4 weeks-met  Long Term:   Pt will be independent in their HEP in 8 weeks-met  Pt will demonstrate improved FOTO, > predicted level-met (83 with goal of 77)  Pt will be independent with all ADL's-met    Plan  Plan details: Patient was educated in [address] [place] [address] and Plan of Care  All questions were answered to pt's satisfaction  Planned therapy interventions: home exercise program  Treatment plan discussed with: patient        Subjective Evaluation    History of Present Illness  Mechanism of injury: Pt reports she is no longer experiencing pain or functional limitations at this time  Pt feels she has returned to her previous level of function  Pt notes compliance with HEP       Pain  No pain reported    Treatments  Current treatment: physical therapy  Patient Goals  Patient goals for therapy: decreased pain, independence with ADLs/IADLs and increased strength          Objective     Strength/Myotome Testing     Left Hip   Planes of Motion   Flexion: 4+  Extension: 5  Abduction: 5  External rotation: 5    Right Hip   Planes of Motion   Flexion: 4+  Extension: 5  Abduction: 5  External rotation: 4+             Precautions: L JANET (2020), R TKA (2017), OA        Manuals 11/17 12/3 12/17  1/14                                                                                                               Neuro Re-Ed                       TA w/LPD   otb 3"x15 otb 3"x15  reviewed               TA w/unilaeral row   otb 3"x15ea otb 3"x15 ea   reviewed               TA w/multifidus press   otb 3"x10ea otb 3"x10 ea   reviewed                                                                                                               Ther Ex                       Bike or Nustep   Bike x10' Bike x10'                 Seated lumbar flexion w/pball   10"x10 10"x10  reviewed               HS stretch 90/90   10"x5ea 10"x5 ea   reviewed               TA w/march   x15ea 15 ea   reviewed               TA w/kicks   Indigo Danika 15 ea   reviewed               TA w/bridges   3"x15 3"x15  reviewed               TA w/clamshells   3"x15ea 3"x15 ea   reviewed               TA w/prone hip ext   3"x10ea 3"x10 ea   reviewed               Pt education + HEP instruction TP 8 mins                     Re-assessment    TP         Ther Activity                       Mini squats   x15 15  reviewed                                       Gait Training                                                                       Modalities

## 2022-02-03 ENCOUNTER — TELEPHONE (OUTPATIENT)
Dept: NEUROSURGERY | Facility: CLINIC | Age: 67
End: 2022-02-03

## 2022-02-03 NOTE — TELEPHONE ENCOUNTER
(PT STATED SHE DECLINED PAIN MEDICINE RECOMMANDATIONS SINCE SHE HAS NO MORE PAIN-PHYSICAL THERAPY HELPED-PT STATED SHE WALK BACK IF NEEDED TO RESCHEDULE WITH DR DOW IF SHE EXPERIENCES ANY NEW OR WORSENING SX  2/3/22 FD)

## 2022-02-03 NOTE — TELEPHONE ENCOUNTER
LM FOR PT TO CB TO DISCUSS HER UPCOMING 2/8/22 APPT W/DR DOW  PT HAS NOT SEEN PAIN MEDICINE AS PER DR DOW RECOMMENDATIONS   WOULD NEED TO RESCHEDULE TO AFTER  SHE SEES PM

## 2022-03-10 ENCOUNTER — OFFICE VISIT (OUTPATIENT)
Dept: FAMILY MEDICINE CLINIC | Facility: MEDICAL CENTER | Age: 67
End: 2022-03-10
Payer: MEDICARE

## 2022-03-10 VITALS
TEMPERATURE: 99.3 F | HEIGHT: 64 IN | HEART RATE: 80 BPM | DIASTOLIC BLOOD PRESSURE: 70 MMHG | WEIGHT: 123 LBS | BODY MASS INDEX: 21 KG/M2 | SYSTOLIC BLOOD PRESSURE: 115 MMHG | OXYGEN SATURATION: 98 %

## 2022-03-10 DIAGNOSIS — K21.9 GASTROESOPHAGEAL REFLUX DISEASE: ICD-10-CM

## 2022-03-10 DIAGNOSIS — F51.01 PRIMARY INSOMNIA: ICD-10-CM

## 2022-03-10 DIAGNOSIS — E78.01 FAMILIAL HYPERCHOLESTEROLEMIA: Primary | ICD-10-CM

## 2022-03-10 DIAGNOSIS — Z13.29 THYROID DISORDER SCREEN: ICD-10-CM

## 2022-03-10 PROCEDURE — 99214 OFFICE O/P EST MOD 30 MIN: CPT | Performed by: FAMILY MEDICINE

## 2022-03-10 NOTE — PROGRESS NOTES
Lupe Campbell is here for checkup  She  had  COVID in January  Headache, fatigue, scratchy throat for a few days  She ahs had a cold for past several weeks  Has post nasal drip, nasal congestion, scratchy throat  She has no further back pain  She continues to get Prolia injections through rheumatology for her osteoporosis  DEXA scan 2021 showed improvement in density  She saw Dermatology for her facial lesion  Sounds like it was  actinic keratoses  She continues to do well with trazodone for sleep  O: /70 (BP Location: Left arm, Patient Position: Sitting, Cuff Size: Adult)   Pulse 80   Temp 99 3 °F (37 4 °C)   Ht 5' 4" (1 626 m)   Wt 55 8 kg (123 lb)   SpO2 98%   BMI 21 11 kg/m²      Assessment  1  Hyperlipidemia-due for blood work  2  Esophageal reflux-does well with Prilosec  3  Insomnia-does well with trazodone    Plan  Check blood work  Call with results  Refill her prescriptions  Recheck 6 months

## 2022-03-11 RX ORDER — TRAZODONE HYDROCHLORIDE 50 MG/1
50 TABLET ORAL
Qty: 90 TABLET | Refills: 1 | Status: SHIPPED | OUTPATIENT
Start: 2022-03-11

## 2022-03-11 RX ORDER — OMEPRAZOLE 20 MG/1
20 CAPSULE, DELAYED RELEASE ORAL DAILY
Qty: 90 CAPSULE | Refills: 1 | Status: SHIPPED | OUTPATIENT
Start: 2022-03-11

## 2022-04-01 ENCOUNTER — APPOINTMENT (OUTPATIENT)
Dept: LAB | Age: 67
End: 2022-04-01
Payer: MEDICARE

## 2022-04-01 DIAGNOSIS — E78.01 FAMILIAL HYPERCHOLESTEROLEMIA: ICD-10-CM

## 2022-04-01 DIAGNOSIS — Z13.29 THYROID DISORDER SCREEN: ICD-10-CM

## 2022-04-01 LAB
ALBUMIN SERPL BCP-MCNC: 4 G/DL (ref 3.5–5)
ALP SERPL-CCNC: 43 U/L (ref 46–116)
ALT SERPL W P-5'-P-CCNC: 18 U/L (ref 12–78)
ANION GAP SERPL CALCULATED.3IONS-SCNC: 3 MMOL/L (ref 4–13)
AST SERPL W P-5'-P-CCNC: 13 U/L (ref 5–45)
BASOPHILS # BLD AUTO: 0.1 THOUSANDS/ΜL (ref 0–0.1)
BASOPHILS NFR BLD AUTO: 1 % (ref 0–1)
BILIRUB SERPL-MCNC: 0.57 MG/DL (ref 0.2–1)
BUN SERPL-MCNC: 15 MG/DL (ref 5–25)
CALCIUM SERPL-MCNC: 9.7 MG/DL (ref 8.3–10.1)
CHLORIDE SERPL-SCNC: 110 MMOL/L (ref 100–108)
CHOLEST SERPL-MCNC: 201 MG/DL
CO2 SERPL-SCNC: 29 MMOL/L (ref 21–32)
CREAT SERPL-MCNC: 0.96 MG/DL (ref 0.6–1.3)
EOSINOPHIL # BLD AUTO: 0.35 THOUSAND/ΜL (ref 0–0.61)
EOSINOPHIL NFR BLD AUTO: 5 % (ref 0–6)
ERYTHROCYTE [DISTWIDTH] IN BLOOD BY AUTOMATED COUNT: 11.9 % (ref 11.6–15.1)
GFR SERPL CREATININE-BSD FRML MDRD: 61 ML/MIN/1.73SQ M
GLUCOSE P FAST SERPL-MCNC: 91 MG/DL (ref 65–99)
HCT VFR BLD AUTO: 40.7 % (ref 34.8–46.1)
HDLC SERPL-MCNC: 58 MG/DL
HGB BLD-MCNC: 13.1 G/DL (ref 11.5–15.4)
IMM GRANULOCYTES # BLD AUTO: 0.03 THOUSAND/UL (ref 0–0.2)
IMM GRANULOCYTES NFR BLD AUTO: 0 % (ref 0–2)
LDLC SERPL CALC-MCNC: 119 MG/DL (ref 0–100)
LYMPHOCYTES # BLD AUTO: 2.44 THOUSANDS/ΜL (ref 0.6–4.47)
LYMPHOCYTES NFR BLD AUTO: 35 % (ref 14–44)
MCH RBC QN AUTO: 30.3 PG (ref 26.8–34.3)
MCHC RBC AUTO-ENTMCNC: 32.2 G/DL (ref 31.4–37.4)
MCV RBC AUTO: 94 FL (ref 82–98)
MONOCYTES # BLD AUTO: 0.59 THOUSAND/ΜL (ref 0.17–1.22)
MONOCYTES NFR BLD AUTO: 9 % (ref 4–12)
NEUTROPHILS # BLD AUTO: 3.41 THOUSANDS/ΜL (ref 1.85–7.62)
NEUTS SEG NFR BLD AUTO: 50 % (ref 43–75)
NONHDLC SERPL-MCNC: 143 MG/DL
NRBC BLD AUTO-RTO: 0 /100 WBCS
PLATELET # BLD AUTO: 400 THOUSANDS/UL (ref 149–390)
PMV BLD AUTO: 9.9 FL (ref 8.9–12.7)
POTASSIUM SERPL-SCNC: 3.9 MMOL/L (ref 3.5–5.3)
PROT SERPL-MCNC: 7 G/DL (ref 6.4–8.2)
RBC # BLD AUTO: 4.33 MILLION/UL (ref 3.81–5.12)
SODIUM SERPL-SCNC: 142 MMOL/L (ref 136–145)
TRIGL SERPL-MCNC: 118 MG/DL
TSH SERPL DL<=0.05 MIU/L-ACNC: 2.03 UIU/ML (ref 0.36–3.74)
WBC # BLD AUTO: 6.92 THOUSAND/UL (ref 4.31–10.16)

## 2022-04-01 PROCEDURE — 80053 COMPREHEN METABOLIC PANEL: CPT

## 2022-04-01 PROCEDURE — 36415 COLL VENOUS BLD VENIPUNCTURE: CPT

## 2022-04-01 PROCEDURE — 80061 LIPID PANEL: CPT

## 2022-04-01 PROCEDURE — 84443 ASSAY THYROID STIM HORMONE: CPT

## 2022-04-01 PROCEDURE — 85025 COMPLETE CBC W/AUTO DIFF WBC: CPT

## 2022-04-04 ENCOUNTER — TELEPHONE (OUTPATIENT)
Dept: FAMILY MEDICINE CLINIC | Facility: MEDICAL CENTER | Age: 67
End: 2022-04-04

## 2022-04-04 NOTE — TELEPHONE ENCOUNTER
----- Message from Aman Sena MD sent at 4/2/2022  2:35 PM EDT -----  Notify blood work is all good  Cholesterol shows a significant improvement from last year; went from 251 down to 201    Did she do anything differently or taking supplements, etc?  Platelets elevated but 400,000 which she has had the past

## 2022-04-04 NOTE — TELEPHONE ENCOUNTER
Pt aware-she states that the only change is she has been very careful with her diet since last year because she did not want to be placed on medication  No supplementation

## 2022-04-06 ENCOUNTER — OFFICE VISIT (OUTPATIENT)
Dept: RHEUMATOLOGY | Facility: CLINIC | Age: 67
End: 2022-04-06
Payer: MEDICARE

## 2022-04-06 DIAGNOSIS — M81.8 OTHER OSTEOPOROSIS WITHOUT CURRENT PATHOLOGICAL FRACTURE: Primary | ICD-10-CM

## 2022-04-06 DIAGNOSIS — Z51.81 ENCOUNTER FOR MONITORING DENOSUMAB THERAPY: ICD-10-CM

## 2022-04-06 DIAGNOSIS — Z79.899 ENCOUNTER FOR MONITORING DENOSUMAB THERAPY: ICD-10-CM

## 2022-04-06 PROCEDURE — 96372 THER/PROPH/DIAG INJ SC/IM: CPT

## 2022-04-18 ENCOUNTER — HOSPITAL ENCOUNTER (OUTPATIENT)
Dept: RADIOLOGY | Facility: MEDICAL CENTER | Age: 67
Discharge: HOME | End: 2022-04-18
Payer: MEDICARE

## 2022-04-18 VITALS — HEIGHT: 64 IN | WEIGHT: 123 LBS | BODY MASS INDEX: 21 KG/M2

## 2022-04-18 DIAGNOSIS — Z12.31 SCREENING MAMMOGRAM, ENCOUNTER FOR: ICD-10-CM

## 2022-04-18 PROCEDURE — 77067 SCR MAMMO BI INCL CAD: CPT

## 2022-04-18 PROCEDURE — 77063 BREAST TOMOSYNTHESIS BI: CPT
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
## 2022-05-09 ENCOUNTER — OFFICE VISIT (OUTPATIENT)
Dept: FAMILY MEDICINE CLINIC | Facility: MEDICAL CENTER | Age: 67
End: 2022-05-09
Payer: MEDICARE

## 2022-05-09 VITALS
OXYGEN SATURATION: 100 % | TEMPERATURE: 98.2 F | SYSTOLIC BLOOD PRESSURE: 110 MMHG | HEART RATE: 72 BPM | BODY MASS INDEX: 21.03 KG/M2 | DIASTOLIC BLOOD PRESSURE: 74 MMHG | HEIGHT: 64 IN | WEIGHT: 123.2 LBS

## 2022-05-09 DIAGNOSIS — Z00.00 MEDICARE ANNUAL WELLNESS VISIT, SUBSEQUENT: ICD-10-CM

## 2022-05-09 DIAGNOSIS — Z12.31 ENCOUNTER FOR SCREENING MAMMOGRAM FOR BREAST CANCER: ICD-10-CM

## 2022-05-09 PROCEDURE — G0439 PPPS, SUBSEQ VISIT: HCPCS | Performed by: STUDENT IN AN ORGANIZED HEALTH CARE EDUCATION/TRAINING PROGRAM

## 2022-05-09 NOTE — PATIENT INSTRUCTIONS
Medicare Preventive Visit Patient Instructions  Thank you for completing your Welcome to Medicare Visit or Medicare Annual Wellness Visit today  Your next wellness visit will be due in one year (5/10/2023)  The screening/preventive services that you may require over the next 5-10 years are detailed below  Some tests may not apply to you based off risk factors and/or age  Screening tests ordered at today's visit but not completed yet may show as past due  Also, please note that scanned in results may not display below  Preventive Screenings:  Service Recommendations Previous Testing/Comments   Colorectal Cancer Screening  * Colonoscopy    * Fecal Occult Blood Test (FOBT)/Fecal Immunochemical Test (FIT)  * Fecal DNA/Cologuard Test  * Flexible Sigmoidoscopy Age: 54-65 years old   Colonoscopy: every 10 years (may be performed more frequently if at higher risk)  OR  FOBT/FIT: every 1 year  OR  Cologuard: every 3 years  OR  Sigmoidoscopy: every 5 years  Screening may be recommended earlier than age 48 if at higher risk for colorectal cancer  Also, an individualized decision between you and your healthcare provider will decide whether screening between the ages of 74-80 would be appropriate  Colonoscopy: 11/11/2014  FOBT/FIT: Not on file  Cologuard: Not on file  Sigmoidoscopy: Not on file    Screening Current     Breast Cancer Screening Age: 36 years old  Frequency: every 1-2 years  Not required if history of left and right mastectomy Mammogram: 04/18/2022    Screening Current   Cervical Cancer Screening Between the ages of 21-29, pap smear recommended once every 3 years  Between the ages of 33-67, can perform pap smear with HPV co-testing every 5 years     Recommendations may differ for women with a history of total hysterectomy, cervical cancer, or abnormal pap smears in past  Pap Smear: 11/10/2021    Screening Not Indicated   Hepatitis C Screening Once for adults born between 1945 and 1965  More frequently in patients at high risk for Hepatitis C Hep C Antibody: 05/26/2021    Screening Current   Diabetes Screening 1-2 times per year if you're at risk for diabetes or have pre-diabetes Fasting glucose: 91 mg/dL   A1C: 5 4 %    Screening Current   Cholesterol Screening Once every 5 years if you don't have a lipid disorder  May order more often based on risk factors  Lipid panel: 04/01/2022    Screening Not Indicated  History Lipid Disorder     Other Preventive Screenings Covered by Medicare:  1  Abdominal Aortic Aneurysm (AAA) Screening: covered once if your at risk  You're considered to be at risk if you have a family history of AAA  2  Lung Cancer Screening: covers low dose CT scan once per year if you meet all of the following conditions: (1) Age 50-69; (2) No signs or symptoms of lung cancer; (3) Current smoker or have quit smoking within the last 15 years; (4) You have a tobacco smoking history of at least 30 pack years (packs per day multiplied by number of years you smoked); (5) You get a written order from a healthcare provider  3  Glaucoma Screening: covered annually if you're considered high risk: (1) You have diabetes OR (2) Family history of glaucoma OR (3)  aged 48 and older OR (3)  American aged 72 and older  3  Osteoporosis Screening: covered every 2 years if you meet one of the following conditions: (1) You're estrogen deficient and at risk for osteoporosis based off medical history and other findings; (2) Have a vertebral abnormality; (3) On glucocorticoid therapy for more than 3 months; (4) Have primary hyperparathyroidism; (5) On osteoporosis medications and need to assess response to drug therapy  · Last bone density test (DXA Scan): 08/18/2021   5  HIV Screening: covered annually if you're between the age of 15-65  Also covered annually if you are younger than 13 and older than 72 with risk factors for HIV infection   For pregnant patients, it is covered up to 3 times per pregnancy  Immunizations:  Immunization Recommendations   Influenza Vaccine Annual influenza vaccination during flu season is recommended for all persons aged >= 6 months who do not have contraindications   Pneumococcal Vaccine (Prevnar and Pneumovax)  * Prevnar = PCV13  * Pneumovax = PPSV23   Adults 25-60 years old: 1-3 doses may be recommended based on certain risk factors  Adults 72 years old: Prevnar (PCV13) vaccine recommended followed by Pneumovax (PPSV23) vaccine  If already received PPSV23 since turning 65, then PCV13 recommended at least one year after PPSV23 dose  Hepatitis B Vaccine 3 dose series if at intermediate or high risk (ex: diabetes, end stage renal disease, liver disease)   Tetanus (Td) Vaccine - COST NOT COVERED BY MEDICARE PART B Following completion of primary series, a booster dose should be given every 10 years to maintain immunity against tetanus  Td may also be given as tetanus wound prophylaxis  Tdap Vaccine - COST NOT COVERED BY MEDICARE PART B Recommended at least once for all adults  For pregnant patients, recommended with each pregnancy  Shingles Vaccine (Shingrix) - COST NOT COVERED BY MEDICARE PART B  2 shot series recommended in those aged 48 and above     Health Maintenance Due:      Topic Date Due    Cervical Cancer Screening  11/04/2023    Breast Cancer Screening: Mammogram  04/18/2024    Colorectal Cancer Screening  11/11/2024    DXA SCAN  08/18/2026    Hepatitis C Screening  Completed     Immunizations Due:  There are no preventive care reminders to display for this patient  Advance Directives   What are advance directives? Advance directives are legal documents that state your wishes and plans for medical care  These plans are made ahead of time in case you lose your ability to make decisions for yourself  Advance directives can apply to any medical decision, such as the treatments you want, and if you want to donate organs     What are the types of advance directives? There are many types of advance directives, and each state has rules about how to use them  You may choose a combination of any of the following:  · Living will: This is a written record of the treatment you want  You can also choose which treatments you do not want, which to limit, and which to stop at a certain time  This includes surgery, medicine, IV fluid, and tube feedings  · Durable power of  for healthcare [facility]): This is a written record that states who you want to make healthcare choices for you when you are unable to make them for yourself  This person, called a proxy, is usually a family member or a friend  You may choose more than 1 proxy  · Do not resuscitate (DNR) order:  A DNR order is used in case your heart stops beating or you stop breathing  It is a request not to have certain forms of treatment, such as CPR  A DNR order may be included in other types of advance directives  · Medical directive: This covers the care that you want if you are in a coma, near death, or unable to make decisions for yourself  You can list the treatments you want for each condition  Treatment may include pain medicine, surgery, blood transfusions, dialysis, IV or tube feedings, and a ventilator (breathing machine)  · Values history: This document has questions about your views, beliefs, and how you feel and think about life  This information can help others choose the care that you would choose  Why are advance directives important? An advance directive helps you control your care  Although spoken wishes may be used, it is better to have your wishes written down  Spoken wishes can be misunderstood, or not followed  Treatments may be given even if you do not want them  An advance directive may make it easier for your family to make difficult choices about your care        © Copyright [employer] 2018 Information is for End User's use only and may not be sold, redistributed or otherwise used for commercial purposes   All illustrations and images included in CareNotes® are the copyrighted property of A D A M , Inc  or [facility] Pato Mason

## 2022-07-16 ENCOUNTER — HOSPITAL ENCOUNTER (EMERGENCY)
Facility: HOSPITAL | Age: 67
Discharge: HOME | End: 2022-07-16
Attending: EMERGENCY MEDICINE
Payer: MEDICARE

## 2022-07-16 ENCOUNTER — APPOINTMENT (EMERGENCY)
Dept: RADIOLOGY | Facility: HOSPITAL | Age: 67
End: 2022-07-16
Payer: MEDICARE

## 2022-07-16 VITALS
RESPIRATION RATE: 18 BRPM | TEMPERATURE: 98.2 F | SYSTOLIC BLOOD PRESSURE: 133 MMHG | HEART RATE: 84 BPM | DIASTOLIC BLOOD PRESSURE: 65 MMHG | OXYGEN SATURATION: 98 %

## 2022-07-16 DIAGNOSIS — S62.604A: Primary | ICD-10-CM

## 2022-07-16 DIAGNOSIS — S62.304A: ICD-10-CM

## 2022-07-16 DIAGNOSIS — S62.606A: ICD-10-CM

## 2022-07-16 PROCEDURE — 99284 EMERGENCY DEPT VISIT MOD MDM: CPT | Performed by: EMERGENCY MEDICINE

## 2022-07-16 PROCEDURE — 73110 X-RAY EXAM OF WRIST: CPT

## 2022-07-16 PROCEDURE — 73130 X-RAY EXAM OF HAND: CPT

## 2022-07-16 PROCEDURE — 99283 EMERGENCY DEPT VISIT LOW MDM: CPT

## 2022-07-16 PROCEDURE — 29125 APPL SHORT ARM SPLINT STATIC: CPT | Performed by: EMERGENCY MEDICINE

## 2022-07-16 RX ORDER — OXYCODONE HYDROCHLORIDE AND ACETAMINOPHEN 5; 325 MG/1; MG/1
1 TABLET ORAL EVERY 4 HOURS PRN
Qty: 10 TABLET | Refills: 0 | Status: SHIPPED | OUTPATIENT
Start: 2022-07-16 | End: 2022-10-12

## 2022-07-16 RX ORDER — DOCUSATE SODIUM 100 MG/1
100 CAPSULE, LIQUID FILLED ORAL EVERY 12 HOURS
Qty: 20 CAPSULE | Refills: 0 | Status: SHIPPED | OUTPATIENT
Start: 2022-07-16 | End: 2022-10-12

## 2022-07-16 RX ORDER — IBUPROFEN 600 MG/1
600 TABLET ORAL EVERY 6 HOURS PRN
Qty: 30 TABLET | Refills: 0 | Status: SHIPPED | OUTPATIENT
Start: 2022-07-16 | End: 2022-10-12

## 2022-07-16 RX ORDER — IBUPROFEN 600 MG/1
600 TABLET ORAL ONCE
Status: COMPLETED | OUTPATIENT
Start: 2022-07-16 | End: 2022-07-16

## 2022-07-16 RX ORDER — OXYCODONE HYDROCHLORIDE AND ACETAMINOPHEN 5; 325 MG/1; MG/1
1 TABLET ORAL ONCE
Status: COMPLETED | OUTPATIENT
Start: 2022-07-16 | End: 2022-07-16

## 2022-07-16 RX ADMIN — IBUPROFEN 600 MG: 600 TABLET, FILM COATED ORAL at 19:03

## 2022-07-16 RX ADMIN — OXYCODONE HYDROCHLORIDE AND ACETAMINOPHEN 1 TABLET: 5; 325 TABLET ORAL at 19:02

## 2022-07-16 NOTE — ED ATTENDING ATTESTATION
7/16/2022  IDeangelo DO, saw and evaluated the patient  I have discussed the patient with the resident/non-physician practitioner and agree with the resident's/non-physician practitioner's findings, Plan of Care, and MDM as documented in the resident's/non-physician practitioner's note, except where noted  All available labs and Radiology studies were reviewed  I was present for key portions of any procedure(s) performed by the resident/non-physician practitioner and I was immediately available to provide assistance  At this point I agree with the current assessment done in the Emergency Department  I have conducted an independent evaluation of this patient a history and physical is as follows:    69-year-old female coming into the ED after R hand injury while playing wiffleball, she tripped and "slid" into 3rd base  Bent her right hand awkardly and felt a pop  C/o pain to her right 4th and 5th fingers  No other injuries, no head strike  On physical exam: pt is uncomfortable appearing, guarding right hand, mod pain distress, but nontoxic, stable vitals  mucous membranes moist   Neuro intact, gcs 15  Cap refill < 2 sec, skin warm and dry  R hand: slight ulnar deviation to the 4th and 5th digits  TTP overlying prox phalanges of 4th and 5th fingers, and distal 4th metacarpal       ED Course      xray reveals fractures to 4th and 5th digits, 4th MCp head  Placed in ulnar gutter splint, tolerated well, f/u ortho/hand      Critical Care Time  Procedures
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
## 2022-07-18 ENCOUNTER — TELEPHONE (OUTPATIENT)
Dept: OBGYN CLINIC | Facility: HOSPITAL | Age: 67
End: 2022-07-18

## 2022-09-13 DIAGNOSIS — F51.01 PRIMARY INSOMNIA: ICD-10-CM

## 2022-09-13 DIAGNOSIS — K21.9 GASTROESOPHAGEAL REFLUX DISEASE: ICD-10-CM

## 2022-09-13 RX ORDER — TRAZODONE HYDROCHLORIDE 50 MG/1
50 TABLET ORAL
Qty: 90 TABLET | Refills: 1 | Status: SHIPPED | OUTPATIENT
Start: 2022-09-13

## 2022-09-13 RX ORDER — OMEPRAZOLE 20 MG/1
20 CAPSULE, DELAYED RELEASE ORAL DAILY
Qty: 90 CAPSULE | Refills: 1 | Status: SHIPPED | OUTPATIENT
Start: 2022-09-13

## 2022-10-01 ENCOUNTER — OFFICE VISIT (OUTPATIENT)
Dept: URGENT CARE | Age: 67
End: 2022-10-01
Payer: MEDICARE

## 2022-10-01 VITALS
HEIGHT: 63 IN | OXYGEN SATURATION: 99 % | HEART RATE: 79 BPM | RESPIRATION RATE: 16 BRPM | TEMPERATURE: 98.8 F | BODY MASS INDEX: 22.15 KG/M2 | WEIGHT: 125 LBS
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
## 2022-10-01 PROCEDURE — G0463 HOSPITAL OUTPT CLINIC VISIT: HCPCS

## 2022-10-01 PROCEDURE — 90715 TDAP VACCINE 7 YRS/> IM: CPT

## 2022-10-01 PROCEDURE — 99213 OFFICE O/P EST LOW 20 MIN: CPT

## 2022-10-01 PROCEDURE — 90471 IMMUNIZATION ADMIN: CPT

## 2022-10-01 RX ORDER — DOXYCYCLINE 100 MG/1
100 TABLET ORAL 2 TIMES DAILY
Qty: 20 TABLET | Refills: 0 | Status: SHIPPED | OUTPATIENT
Start: 2022-10-01 | End: 2022-10-11

## 2022-10-01 NOTE — PATIENT INSTRUCTIONS
Follow-up with primary care provider soon as possible  Monitor for signs of infection including swelling, redness, drainage, fever greater than 100 4  Report to emergency department if he noticed any signs of wound infection
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
## 2022-10-12 ENCOUNTER — OFFICE VISIT (OUTPATIENT)
Dept: RHEUMATOLOGY | Facility: CLINIC | Age: 67
End: 2022-10-12
Payer: MEDICARE

## 2022-10-12 VITALS
WEIGHT: 126.9 LBS | HEIGHT: 63 IN | DIASTOLIC BLOOD PRESSURE: 84 MMHG | BODY MASS INDEX: 22.48 KG/M2 | SYSTOLIC BLOOD PRESSURE: 136 MMHG | HEART RATE: 90 BPM | OXYGEN SATURATION: 98 %

## 2022-10-12 DIAGNOSIS — Z51.81 ENCOUNTER FOR MONITORING DENOSUMAB THERAPY: ICD-10-CM

## 2022-10-12 DIAGNOSIS — M15.0 PRIMARY GENERALIZED (OSTEO)ARTHRITIS: ICD-10-CM

## 2022-10-12 DIAGNOSIS — Z79.899 ENCOUNTER FOR MONITORING DENOSUMAB THERAPY: ICD-10-CM

## 2022-10-12 DIAGNOSIS — M81.8 OTHER OSTEOPOROSIS WITHOUT CURRENT PATHOLOGICAL FRACTURE: Primary | ICD-10-CM

## 2022-10-12 PROCEDURE — 99214 OFFICE O/P EST MOD 30 MIN: CPT | Performed by: INTERNAL MEDICINE

## 2022-10-12 PROCEDURE — 96372 THER/PROPH/DIAG INJ SC/IM: CPT | Performed by: INTERNAL MEDICINE

## 2022-11-16 ENCOUNTER — HOSPITAL ENCOUNTER (OUTPATIENT)
Facility: HOSPITAL | Age: 67
Setting detail: OBSERVATION
Discharge: HOME | End: 2022-11-17
Attending: EMERGENCY MEDICINE | Admitting: HOSPITALIST

## 2022-11-16 ENCOUNTER — APPOINTMENT (EMERGENCY)
Dept: CT IMAGING | Facility: HOSPITAL | Age: 67
End: 2022-11-16

## 2022-11-16 ENCOUNTER — ANESTHESIA EVENT (OUTPATIENT)
Dept: PERIOP | Facility: HOSPITAL | Age: 67
End: 2022-11-16

## 2022-11-16 ENCOUNTER — ANESTHESIA (OUTPATIENT)
Dept: PERIOP | Facility: HOSPITAL | Age: 67
End: 2022-11-16

## 2022-11-16 ENCOUNTER — APPOINTMENT (OUTPATIENT)
Dept: RADIOLOGY | Facility: HOSPITAL | Age: 67
End: 2022-11-16

## 2022-11-16 DIAGNOSIS — N13.2 HYDRONEPHROSIS WITH URINARY OBSTRUCTION DUE TO URETERAL CALCULUS: ICD-10-CM

## 2022-11-16 DIAGNOSIS — N20.1 URETEROLITHIASIS: Primary | ICD-10-CM

## 2022-11-16 DIAGNOSIS — N13.30 HYDROURETERONEPHROSIS: ICD-10-CM

## 2022-11-16 DIAGNOSIS — R10.9 RIGHT FLANK PAIN: ICD-10-CM

## 2022-11-16 PROBLEM — A41.9 SEPSIS (HCC): Status: ACTIVE | Noted: 2022-11-16

## 2022-11-16 LAB
ALBUMIN SERPL BCP-MCNC: 3.8 G/DL (ref 3.5–5)
ALBUMIN SERPL BCP-MCNC: 5.2 G/DL (ref 3.5–5)
ALP SERPL-CCNC: 38 U/L (ref 34–104)
ALP SERPL-CCNC: 50 U/L (ref 34–104)
ALT SERPL W P-5'-P-CCNC: 12 U/L (ref 7–52)
ALT SERPL W P-5'-P-CCNC: 7 U/L (ref 7–52)
AMORPH URATE CRY URNS QL MICRO: ABNORMAL
ANION GAP SERPL CALCULATED.3IONS-SCNC: 10 MMOL/L (ref 4–13)
ANION GAP SERPL CALCULATED.3IONS-SCNC: 13 MMOL/L (ref 4–13)
AST SERPL W P-5'-P-CCNC: 12 U/L (ref 13–39)
AST SERPL W P-5'-P-CCNC: 18 U/L (ref 13–39)
BACTERIA UR QL AUTO: ABNORMAL /HPF
BASOPHILS # BLD AUTO: 0.04 THOUSANDS/ÂΜL (ref 0–0.1)
BASOPHILS NFR BLD AUTO: 0 % (ref 0–1)
BILIRUB SERPL-MCNC: 0.5 MG/DL (ref 0.2–1)
BILIRUB SERPL-MCNC: 0.61 MG/DL (ref 0.2–1)
BILIRUB UR QL STRIP: NEGATIVE
BUN SERPL-MCNC: 17 MG/DL (ref 5–25)
BUN SERPL-MCNC: 17 MG/DL (ref 5–25)
CALCIUM SERPL-MCNC: 10.3 MG/DL (ref 8.4–10.2)
CALCIUM SERPL-MCNC: 8.2 MG/DL (ref 8.4–10.2)
CHLORIDE SERPL-SCNC: 100 MMOL/L (ref 96–108)
CHLORIDE SERPL-SCNC: 105 MMOL/L (ref 96–108)
CLARITY UR: CLEAR
CO2 SERPL-SCNC: 23 MMOL/L (ref 21–32)
CO2 SERPL-SCNC: 25 MMOL/L (ref 21–32)
COLOR UR: ABNORMAL
CREAT SERPL-MCNC: 1.28 MG/DL (ref 0.6–1.3)
CREAT SERPL-MCNC: 1.32 MG/DL (ref 0.6–1.3)
EOSINOPHIL # BLD AUTO: 0 THOUSAND/ÂΜL (ref 0–0.61)
EOSINOPHIL NFR BLD AUTO: 0 % (ref 0–6)
ERYTHROCYTE [DISTWIDTH] IN BLOOD BY AUTOMATED COUNT: 12 % (ref 11.6–15.1)
GFR SERPL CREATININE-BSD FRML MDRD: 41 ML/MIN/1.73SQ M
GFR SERPL CREATININE-BSD FRML MDRD: 43 ML/MIN/1.73SQ M
GLUCOSE P FAST SERPL-MCNC: 111 MG/DL (ref 65–99)
GLUCOSE SERPL-MCNC: 111 MG/DL (ref 65–140)
GLUCOSE SERPL-MCNC: 163 MG/DL (ref 65–140)
GLUCOSE UR STRIP-MCNC: NEGATIVE MG/DL
HCT VFR BLD AUTO: 44.7 % (ref 34.8–46.1)
HGB BLD-MCNC: 14.8 G/DL (ref 11.5–15.4)
HGB UR QL STRIP.AUTO: ABNORMAL
IMM GRANULOCYTES # BLD AUTO: 0.04 THOUSAND/UL (ref 0–0.2)
IMM GRANULOCYTES NFR BLD AUTO: 0 % (ref 0–2)
KETONES UR STRIP-MCNC: ABNORMAL MG/DL
LACTATE SERPL-SCNC: 1 MMOL/L (ref 0.5–2)
LACTATE SERPL-SCNC: 3 MMOL/L (ref 0.5–2)
LEUKOCYTE ESTERASE UR QL STRIP: NEGATIVE
LIPASE SERPL-CCNC: 15 U/L (ref 11–82)
LYMPHOCYTES # BLD AUTO: 1.06 THOUSANDS/ÂΜL (ref 0.6–4.47)
LYMPHOCYTES NFR BLD AUTO: 8 % (ref 14–44)
MCH RBC QN AUTO: 30 PG (ref 26.8–34.3)
MCHC RBC AUTO-ENTMCNC: 33.1 G/DL (ref 31.4–37.4)
MCV RBC AUTO: 91 FL (ref 82–98)
MONOCYTES # BLD AUTO: 0.37 THOUSAND/ÂΜL (ref 0.17–1.22)
MONOCYTES NFR BLD AUTO: 3 % (ref 4–12)
NEUTROPHILS # BLD AUTO: 12.94 THOUSANDS/ÂΜL (ref 1.85–7.62)
NEUTS SEG NFR BLD AUTO: 88 % (ref 43–75)
NITRITE UR QL STRIP: NEGATIVE
NON-SQ EPI CELLS URNS QL MICRO: ABNORMAL /HPF
NRBC BLD AUTO-RTO: 0 /100 WBCS
PH UR STRIP.AUTO: 7.5 [PH]
PLATELET # BLD AUTO: 355 THOUSANDS/UL (ref 149–390)
PLATELET # BLD AUTO: 469 THOUSANDS/UL (ref 149–390)
PMV BLD AUTO: 9.3 FL (ref 8.9–12.7)
PMV BLD AUTO: 9.4 FL (ref 8.9–12.7)
POTASSIUM SERPL-SCNC: 3.8 MMOL/L (ref 3.5–5.3)
POTASSIUM SERPL-SCNC: 3.9 MMOL/L (ref 3.5–5.3)
PROT SERPL-MCNC: 6.3 G/DL (ref 6.4–8.4)
PROT SERPL-MCNC: 8.5 G/DL (ref 6.4–8.4)
PROT UR STRIP-MCNC: ABNORMAL MG/DL
RBC # BLD AUTO: 4.94 MILLION/UL (ref 3.81–5.12)
RBC #/AREA URNS AUTO: ABNORMAL /HPF
SODIUM SERPL-SCNC: 138 MMOL/L (ref 135–147)
SODIUM SERPL-SCNC: 138 MMOL/L (ref 135–147)
SP GR UR STRIP.AUTO: 1.01 (ref 1–1.03)
UROBILINOGEN UR STRIP-ACNC: <2 MG/DL
WBC # BLD AUTO: 12.94 THOUSAND/UL (ref 4.31–10.16)
WBC #/AREA URNS AUTO: ABNORMAL /HPF

## 2022-11-16 DEVICE — VARIABLE LENGTH INJECTION STENT SET
Type: IMPLANTABLE DEVICE | Site: URETER | Status: FUNCTIONAL
Brand: CONTOUR VL™ INJECTION STENT SET

## 2022-11-16 RX ORDER — PANTOPRAZOLE SODIUM 40 MG/1
40 TABLET, DELAYED RELEASE ORAL
Status: DISCONTINUED | OUTPATIENT
Start: 2022-11-16 | End: 2022-11-17 | Stop reason: HOSPADM

## 2022-11-16 RX ORDER — LIDOCAINE HYDROCHLORIDE 10 MG/ML
INJECTION, SOLUTION EPIDURAL; INFILTRATION; INTRACAUDAL; PERINEURAL AS NEEDED
Status: DISCONTINUED | OUTPATIENT
Start: 2022-11-16 | End: 2022-11-16

## 2022-11-16 RX ORDER — GLYCOPYRROLATE 0.2 MG/ML
INJECTION INTRAMUSCULAR; INTRAVENOUS AS NEEDED
Status: DISCONTINUED | OUTPATIENT
Start: 2022-11-16 | End: 2022-11-16

## 2022-11-16 RX ORDER — PROPOFOL 10 MG/ML
INJECTION, EMULSION INTRAVENOUS AS NEEDED
Status: DISCONTINUED | OUTPATIENT
Start: 2022-11-16 | End: 2022-11-16

## 2022-11-16 RX ORDER — MAGNESIUM HYDROXIDE 1200 MG/15ML
LIQUID ORAL AS NEEDED
Status: DISCONTINUED | OUTPATIENT
Start: 2022-11-16 | End: 2022-11-16 | Stop reason: HOSPADM

## 2022-11-16 RX ORDER — SENNOSIDES 8.6 MG
1 TABLET ORAL
Status: DISCONTINUED | OUTPATIENT
Start: 2022-11-16 | End: 2022-11-17 | Stop reason: HOSPADM

## 2022-11-16 RX ORDER — SODIUM CHLORIDE 9 MG/ML
1000 INJECTION, SOLUTION INTRAVENOUS CONTINUOUS
Status: DISCONTINUED | OUTPATIENT
Start: 2022-11-16 | End: 2022-11-16

## 2022-11-16 RX ORDER — HEPARIN SODIUM 5000 [USP'U]/ML
5000 INJECTION, SOLUTION INTRAVENOUS; SUBCUTANEOUS EVERY 8 HOURS SCHEDULED
Status: DISCONTINUED | OUTPATIENT
Start: 2022-11-16 | End: 2022-11-17 | Stop reason: HOSPADM

## 2022-11-16 RX ORDER — TAMSULOSIN HYDROCHLORIDE 0.4 MG/1
0.4 CAPSULE ORAL
Status: DISCONTINUED | OUTPATIENT
Start: 2022-11-16 | End: 2022-11-17 | Stop reason: HOSPADM

## 2022-11-16 RX ORDER — SODIUM CHLORIDE 9 MG/ML
100 INJECTION, SOLUTION INTRAVENOUS CONTINUOUS
Status: DISCONTINUED | OUTPATIENT
Start: 2022-11-16 | End: 2022-11-17

## 2022-11-16 RX ORDER — MELATONIN
2000 DAILY
Status: DISCONTINUED | OUTPATIENT
Start: 2022-11-16 | End: 2022-11-17 | Stop reason: HOSPADM

## 2022-11-16 RX ORDER — CIPROFLOXACIN 2 MG/ML
400 INJECTION, SOLUTION INTRAVENOUS ONCE
Status: COMPLETED | OUTPATIENT
Start: 2022-11-16 | End: 2022-11-16

## 2022-11-16 RX ORDER — ONDANSETRON 2 MG/ML
INJECTION INTRAMUSCULAR; INTRAVENOUS AS NEEDED
Status: DISCONTINUED | OUTPATIENT
Start: 2022-11-16 | End: 2022-11-16

## 2022-11-16 RX ORDER — TAMSULOSIN HYDROCHLORIDE 0.4 MG/1
0.4 CAPSULE ORAL ONCE
Status: COMPLETED | OUTPATIENT
Start: 2022-11-16 | End: 2022-11-16

## 2022-11-16 RX ORDER — KETOROLAC TROMETHAMINE 30 MG/ML
15 INJECTION, SOLUTION INTRAMUSCULAR; INTRAVENOUS ONCE
Status: COMPLETED | OUTPATIENT
Start: 2022-11-16 | End: 2022-11-16

## 2022-11-16 RX ORDER — OXYCODONE HYDROCHLORIDE 5 MG/1
5 TABLET ORAL EVERY 6 HOURS PRN
Status: DISCONTINUED | OUTPATIENT
Start: 2022-11-16 | End: 2022-11-17 | Stop reason: HOSPADM

## 2022-11-16 RX ORDER — ACETAMINOPHEN 325 MG/1
650 TABLET ORAL EVERY 6 HOURS PRN
Status: DISCONTINUED | OUTPATIENT
Start: 2022-11-16 | End: 2022-11-17 | Stop reason: HOSPADM

## 2022-11-16 RX ORDER — METOCLOPRAMIDE HYDROCHLORIDE 5 MG/ML
10 INJECTION INTRAMUSCULAR; INTRAVENOUS ONCE
Status: COMPLETED | OUTPATIENT
Start: 2022-11-16 | End: 2022-11-16

## 2022-11-16 RX ORDER — DIPHENHYDRAMINE HYDROCHLORIDE 50 MG/ML
12.5 INJECTION INTRAMUSCULAR; INTRAVENOUS ONCE
Status: COMPLETED | OUTPATIENT
Start: 2022-11-16 | End: 2022-11-16

## 2022-11-16 RX ORDER — MAGNESIUM HYDROXIDE/ALUMINUM HYDROXICE/SIMETHICONE 120; 1200; 1200 MG/30ML; MG/30ML; MG/30ML
30 SUSPENSION ORAL EVERY 6 HOURS PRN
Status: DISCONTINUED | OUTPATIENT
Start: 2022-11-16 | End: 2022-11-17 | Stop reason: HOSPADM

## 2022-11-16 RX ORDER — ONDANSETRON 2 MG/ML
4 INJECTION INTRAMUSCULAR; INTRAVENOUS EVERY 6 HOURS PRN
Status: DISCONTINUED | OUTPATIENT
Start: 2022-11-16 | End: 2022-11-17 | Stop reason: HOSPADM

## 2022-11-16 RX ORDER — ONDANSETRON 2 MG/ML
4 INJECTION INTRAMUSCULAR; INTRAVENOUS ONCE AS NEEDED
Status: DISCONTINUED | OUTPATIENT
Start: 2022-11-16 | End: 2022-11-16 | Stop reason: HOSPADM

## 2022-11-16 RX ORDER — HYDROMORPHONE HCL IN WATER/PF 6 MG/30 ML
0.2 PATIENT CONTROLLED ANALGESIA SYRINGE INTRAVENOUS ONCE
Status: COMPLETED | OUTPATIENT
Start: 2022-11-16 | End: 2022-11-16

## 2022-11-16 RX ORDER — DEXAMETHASONE SODIUM PHOSPHATE 10 MG/ML
INJECTION, SOLUTION INTRAMUSCULAR; INTRAVENOUS AS NEEDED
Status: DISCONTINUED | OUTPATIENT
Start: 2022-11-16 | End: 2022-11-16

## 2022-11-16 RX ORDER — OXYCODONE HYDROCHLORIDE 5 MG/1
2.5 TABLET ORAL EVERY 6 HOURS PRN
Status: DISCONTINUED | OUTPATIENT
Start: 2022-11-16 | End: 2022-11-17 | Stop reason: HOSPADM

## 2022-11-16 RX ORDER — CALCIUM CARBONATE 500(1250)
1 TABLET ORAL DAILY
Status: DISCONTINUED | OUTPATIENT
Start: 2022-11-16 | End: 2022-11-17 | Stop reason: HOSPADM

## 2022-11-16 RX ORDER — FENTANYL CITRATE 50 UG/ML
INJECTION, SOLUTION INTRAMUSCULAR; INTRAVENOUS AS NEEDED
Status: DISCONTINUED | OUTPATIENT
Start: 2022-11-16 | End: 2022-11-16

## 2022-11-16 RX ORDER — PROPOFOL 10 MG/ML
INJECTION, EMULSION INTRAVENOUS CONTINUOUS PRN
Status: DISCONTINUED | OUTPATIENT
Start: 2022-11-16 | End: 2022-11-16

## 2022-11-16 RX ORDER — ONDANSETRON 2 MG/ML
4 INJECTION INTRAMUSCULAR; INTRAVENOUS ONCE
Status: COMPLETED | OUTPATIENT
Start: 2022-11-16 | End: 2022-11-16

## 2022-11-16 RX ORDER — CALCIUM CARBONATE 200(500)MG
1000 TABLET,CHEWABLE ORAL DAILY PRN
Status: DISCONTINUED | OUTPATIENT
Start: 2022-11-16 | End: 2022-11-17 | Stop reason: HOSPADM

## 2022-11-16 RX ORDER — FENTANYL CITRATE/PF 50 MCG/ML
25 SYRINGE (ML) INJECTION
Status: DISCONTINUED | OUTPATIENT
Start: 2022-11-16 | End: 2022-11-16 | Stop reason: HOSPADM

## 2022-11-16 RX ORDER — DOCUSATE SODIUM 100 MG/1
100 CAPSULE, LIQUID FILLED ORAL 2 TIMES DAILY PRN
Status: DISCONTINUED | OUTPATIENT
Start: 2022-11-16 | End: 2022-11-17 | Stop reason: HOSPADM

## 2022-11-16 RX ORDER — SODIUM CHLORIDE 9 MG/ML
125 INJECTION, SOLUTION INTRAVENOUS CONTINUOUS
Status: DISCONTINUED | OUTPATIENT
Start: 2022-11-16 | End: 2022-11-17 | Stop reason: HOSPADM

## 2022-11-16 RX ORDER — MIDAZOLAM HYDROCHLORIDE 2 MG/2ML
INJECTION, SOLUTION INTRAMUSCULAR; INTRAVENOUS AS NEEDED
Status: DISCONTINUED | OUTPATIENT
Start: 2022-11-16 | End: 2022-11-16

## 2022-11-16 RX ORDER — HYDROMORPHONE HCL IN WATER/PF 6 MG/30 ML
0.2 PATIENT CONTROLLED ANALGESIA SYRINGE INTRAVENOUS EVERY 4 HOURS PRN
Status: DISCONTINUED | OUTPATIENT
Start: 2022-11-16 | End: 2022-11-17 | Stop reason: HOSPADM

## 2022-11-16 RX ADMIN — OXYCODONE HYDROCHLORIDE 5 MG: 5 TABLET ORAL at 21:45

## 2022-11-16 RX ADMIN — METOCLOPRAMIDE 10 MG: 5 INJECTION, SOLUTION INTRAMUSCULAR; INTRAVENOUS at 03:11

## 2022-11-16 RX ADMIN — HYDROMORPHONE HYDROCHLORIDE 0.2 MG: 0.2 INJECTION, SOLUTION INTRAMUSCULAR; INTRAVENOUS; SUBCUTANEOUS at 03:15

## 2022-11-16 RX ADMIN — PROPOFOL 40 MG: 10 INJECTION, EMULSION INTRAVENOUS at 13:53

## 2022-11-16 RX ADMIN — AZTREONAM 2000 MG: 2 INJECTION, POWDER, LYOPHILIZED, FOR SOLUTION INTRAMUSCULAR; INTRAVENOUS at 21:44

## 2022-11-16 RX ADMIN — DEXAMETHASONE SODIUM PHOSPHATE 10 MG: 10 INJECTION, SOLUTION INTRAMUSCULAR; INTRAVENOUS at 13:55

## 2022-11-16 RX ADMIN — IOHEXOL 100 ML: 350 INJECTION, SOLUTION INTRAVENOUS at 02:41

## 2022-11-16 RX ADMIN — HEPARIN SODIUM 5000 UNITS: 5000 INJECTION INTRAVENOUS; SUBCUTANEOUS at 21:39

## 2022-11-16 RX ADMIN — FENTANYL CITRATE 50 MCG: 50 INJECTION INTRAMUSCULAR; INTRAVENOUS at 13:57

## 2022-11-16 RX ADMIN — CIPROFLOXACIN: 2 INJECTION INTRAVENOUS at 13:45

## 2022-11-16 RX ADMIN — PROPOFOL 120 MG: 10 INJECTION, EMULSION INTRAVENOUS at 13:51

## 2022-11-16 RX ADMIN — CHOLECALCIFEROL TAB 25 MCG (1000 UNIT) 2000 UNITS: 25 TAB at 07:57

## 2022-11-16 RX ADMIN — MIDAZOLAM 1 MG: 1 INJECTION INTRAMUSCULAR; INTRAVENOUS at 13:45

## 2022-11-16 RX ADMIN — SODIUM CHLORIDE 1000 ML: 0.9 INJECTION, SOLUTION INTRAVENOUS at 01:30

## 2022-11-16 RX ADMIN — OXYCODONE HYDROCHLORIDE 5 MG: 5 TABLET ORAL at 16:37

## 2022-11-16 RX ADMIN — AZTREONAM 2000 MG: 2 INJECTION, POWDER, LYOPHILIZED, FOR SOLUTION INTRAMUSCULAR; INTRAVENOUS at 05:57

## 2022-11-16 RX ADMIN — SODIUM CHLORIDE 100 ML/HR: 0.9 INJECTION, SOLUTION INTRAVENOUS at 05:00

## 2022-11-16 RX ADMIN — FENTANYL CITRATE 50 MCG: 50 INJECTION INTRAMUSCULAR; INTRAVENOUS at 14:02

## 2022-11-16 RX ADMIN — TAMSULOSIN HYDROCHLORIDE 0.4 MG: 0.4 CAPSULE ORAL at 16:37

## 2022-11-16 RX ADMIN — PROPOFOL 100 MCG/KG/MIN: 10 INJECTION, EMULSION INTRAVENOUS at 13:55

## 2022-11-16 RX ADMIN — GLYCOPYRROLATE 0.1 MCG: 0.2 INJECTION, SOLUTION INTRAMUSCULAR; INTRAVENOUS at 13:52

## 2022-11-16 RX ADMIN — TAMSULOSIN HYDROCHLORIDE 0.4 MG: 0.4 CAPSULE ORAL at 04:13

## 2022-11-16 RX ADMIN — ONDANSETRON 4 MG: 2 INJECTION INTRAMUSCULAR; INTRAVENOUS at 01:30

## 2022-11-16 RX ADMIN — PROPOFOL 40 MG: 10 INJECTION, EMULSION INTRAVENOUS at 13:54

## 2022-11-16 RX ADMIN — MIDAZOLAM 1 MG: 1 INJECTION INTRAMUSCULAR; INTRAVENOUS at 13:47

## 2022-11-16 RX ADMIN — HEPARIN SODIUM 5000 UNITS: 5000 INJECTION INTRAVENOUS; SUBCUTANEOUS at 05:17

## 2022-11-16 RX ADMIN — OXYCODONE HYDROCHLORIDE 5 MG: 5 TABLET ORAL at 07:56

## 2022-11-16 RX ADMIN — CALCIUM 1 TABLET: 500 TABLET ORAL at 07:57

## 2022-11-16 RX ADMIN — ONDANSETRON 4 MG: 2 INJECTION INTRAMUSCULAR; INTRAVENOUS at 13:55

## 2022-11-16 RX ADMIN — KETOROLAC TROMETHAMINE 15 MG: 30 INJECTION, SOLUTION INTRAMUSCULAR at 01:30

## 2022-11-16 RX ADMIN — LIDOCAINE HYDROCHLORIDE 40 MG: 10 INJECTION, SOLUTION EPIDURAL; INFILTRATION; INTRACAUDAL at 13:51

## 2022-11-16 RX ADMIN — PANTOPRAZOLE SODIUM 40 MG: 40 TABLET, DELAYED RELEASE ORAL at 05:17

## 2022-11-16 RX ADMIN — SODIUM CHLORIDE 125 ML/HR: 0.9 INJECTION, SOLUTION INTRAVENOUS at 16:37

## 2022-11-16 RX ADMIN — DIPHENHYDRAMINE HYDROCHLORIDE 12.5 MG: 50 INJECTION, SOLUTION INTRAMUSCULAR; INTRAVENOUS at 03:15

## 2022-11-16 NOTE — QUICK NOTE
Urology on-call    Received TT about patient who presented to the ER for severe left-sided flank pain and vomiting  She has a history of kidney stones  Initially slightly tachycardic at 101, afebrile, normotensive , WBCs 12, lactic acid 3 2, CMP normal   Urine unremarkable although culture pending  CT scan:    IMPRESSION:     Severe left-sided hydroureteronephrosis with a 7 mm obstructing stone in the proximal left ureter  Moderate left-sided perinephric stranding and left-sided forniceal rupture      Recommended admit to im for medical optimization, NPO for stent placement in the morning    Case requested

## 2022-11-16 NOTE — ASSESSMENT & PLAN NOTE
HR>90  WBC 14 7  Source Left sided hydroureteronephrosis  Lactic Acid 3 0  U/A: RBC 10-20    PLAN:  IV Normal Saline 100ml/Hr  Reflex lactic acid q2hrs until resolved  Cefriaxone IV 1000mg q24 for minimum 7 days

## 2022-11-16 NOTE — OP NOTE
OPERATIVE REPORT  PATIENT NAME: Ronen Rasmussen    :  1955  MRN: [id number]  Pt Location: [facility]    SURGERY DATE: 2022    Surgeon(s) and Role:     * Aleksey Ramirez MD - Primary    Preop Diagnosis:  Ureterolithiasis [N20 1]  Hydronephrosis with urinary obstruction due to ureteral calculus [N13 2]    Post-Op Diagnosis Codes:     * Ureterolithiasis [N20 1]     * Hydronephrosis with urinary obstruction due to ureteral calculus [N13 2]    Procedure(s) (LRB):  CYSTOSCOPY RETROGRADE PYELOGRAM WITH INSERTION STENT URETERAL (Left)    Specimen(s):  * No specimens in log *    Estimated Blood Loss:   Minimal    Drains:  Ureteral Internal Stent Left ureter 6 Fr  (Active)   Number of days: 0       Anesthesia Type:   General    Operative Indications:  Ureterolithiasis [N20 1]  Hydronephrosis with urinary obstruction due to ureteral calculus [N13 2]      Operative Findings:  Normal bladder and orifices  On preliminary fluoroscopic images there was hydronephrosis and retained contrast in left collecting system collimating down to the upper ureteral stone  On retrograde pyelography the distal ureter was unremarkable  A 6 Angolan contour stent was easily placed and seen to be in good position at the conclusion of the procedure  Complications:   None    Procedure and Technique:  The patient was brought to the operating room properly identified  General anesthesia was administered  She was placed in lithotomy position, padded appropriately and prepped and draped in the usual sterile fashion  Compression boots were employed  Intravenous antibiotic administered  An appropriate time-out was then performed  Cystourethroscopy was performed with 25 Angolan cystoscope with findings as above  A tiger tail catheter and dilute Omnipaque were then utilized perform a left retrograde pyelogram   Findings, are as above    A Solo wire was then easily placed up the left collecting system and pushed past the stone under fluoroscopic guidance into the renal pelvis  A 6 Czech contour stent was then passed over the guidewire and pushed into position  The guidewire was then withdrawn and a nice coil noted in the renal pelvis  The stent was detached from the pusher leaving a nice coil in the bladder  The bladder was then drained cystoscope removed  The patient tolerated procedure well  Blood loss was minimal   She was awakened from anesthesia and taken to the recovery room in satisfactory condition    She will require definitive ureteroscopic procedure in the future   I was present for the entire procedure    Patient Disposition:  PACU  and patient will return to OR for planned surgery as a staged procedure        SIGNATURE: Pricila Maldonado MD  DATE: November 16, 2022  TIME: 2:31 PM
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
## 2022-11-16 NOTE — INTERVAL H&P NOTE
H&P reviewed  After examining the patient I find no changes in the patients condition since the H&P had been written  Vitals:    11/16/22 1240   BP: 125/61   Pulse: 89   Resp: 17   Temp: 99 7 °F (37 6 °C)   SpO2: 100%   Procedure and risks reviewed in the holding unit  Laterality marked- LEFT

## 2022-11-16 NOTE — ASSESSMENT & PLAN NOTE
HR>90  WBC 14 7  Source Left sided hydroureteronephrosis  Lactic Acid 3 0  U/A: RBC 10-20    PLAN:  IV Normal Saline 100ml/Hr  Reflex lactic acid q2hrs until resolved  Aztreonam 2000mg IV q8hrs for minimum 7 days (Pt has penicillin allergy, pharmacy was called and cephalosporins were not recommended)  Trend WBC

## 2022-11-16 NOTE — ASSESSMENT & PLAN NOTE
CT abdomen pelvis w contrast    Result Date: 11/16/2022  Impression: Severe left-sided hydroureteronephrosis with a 7 mm obstructing stone in the proximal left ureter    Moderate left-sided perinephric stranding and left-sided forniceal rupture Workstation performed: [id number]       PLAN:  Consult Urology placed [id number]  IV Normal Saline 100ml/hr  Geriatric pain order set  Trend CBC for infection  Trend BMP for kidney function and electrolyte balance
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
## 2022-11-16 NOTE — ASSESSMENT & PLAN NOTE
CT abdomen pelvis w contrast    Result Date: 11/16/2022  Impression: Severe left-sided hydroureteronephrosis with a 7 mm obstructing stone in the proximal left ureter    Moderate left-sided perinephric stranding and left-sided forniceal rupture Workstation performed: [id number]
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
## 2022-11-16 NOTE — ANESTHESIA PREPROCEDURE EVALUATION
Procedure:  CYSTOSCOPY RETROGRADE PYELOGRAM WITH INSERTION STENT URETERAL (Left: Ureter)    Relevant Problems   CARDIO   (+) Hyperlipidemia      GI/HEPATIC   (+) Esophageal reflux      /RENAL   (+) Hydroureteronephrosis      MUSCULOSKELETAL   (+) Back pain   (+) Degenerative lumbar disc   (+) Primary generalized (osteo)arthritis   (+) Sacroiliitis (HCC)   (+) Sciatica of right side   (+) Unilateral osteoarthritis of hip, left      Endocrine   (+) Bilateral ovarian cysts      Nervous and Auditory   (+) Dysfunction of eustachian tube      Musculoskeletal and Integument   (+) Compression fracture of spine (HCC)   (+) Lumbar arthropathy   (+) Osteoporosis      Genitourinary   (+) Vaginitis, atrophic      Hematopoietic and Hemostatic   (+) Essential hemorrhagic thrombocythemia (HCC)      Other   (+) Adjustment disorder with anxiety   (+) Insomnia   (+) Mammogram abnormal   (+) Pain in left hip   (+) Sepsis ([facility] [address] )   (+) Status post left hip replacement      Lab Results   Component Value Date     11/24/2017    SODIUM 138 11/16/2022    K 3 8 11/16/2022     11/16/2022    CO2 23 11/16/2022    ANIONGAP 8 10/25/2013    AGAP 10 11/16/2022    BUN 17 11/16/2022    CREATININE 1 32 (H) 11/16/2022    GLUC 111 11/16/2022    GLUF 111 (H) 11/16/2022    CALCIUM 8 2 (L) 11/16/2022    AST 12 (L) 11/16/2022    ALT 7 11/16/2022    ALKPHOS 38 11/16/2022    PROT 6 6 11/24/2017    TP 6 3 (L) 11/16/2022    BILITOT 0 3 11/24/2017    TBILI 0 50 11/16/2022    EGFR 41 11/16/2022     Lab Results   Component Value Date    WBC 12 94 (H) 11/16/2022    HGB 14 8 11/16/2022    HCT 44 7 11/16/2022    MCV 91 11/16/2022     11/16/2022       Physical Exam    Airway    Mallampati score: II  TM Distance: >3 FB  Neck ROM: full     Dental   No notable dental hx     Cardiovascular      Pulmonary      Other Findings        Anesthesia Plan  ASA Score- 2     Anesthesia Type- general with ASA Monitors           Additional Monitors:   Airway Plan: Plan Factors-Exercise tolerance (METS): >4 METS  Chart reviewed  EKG reviewed  Imaging results reviewed  Existing labs reviewed  Patient summary reviewed  Induction- intravenous  Postoperative Plan- Plan for postoperative opioid use  Planned trial extubation    Informed Consent- Anesthetic plan and risks discussed with patient  I personally reviewed this patient with the CRNA  Discussed and agreed on the Anesthesia Plan with the CRNA  Misti Xavier

## 2022-11-16 NOTE — ANESTHESIA POSTPROCEDURE EVALUATION
Post-Op Assessment Note    CV Status:  Stable  Pain Score: 0    Pain management: adequate     Mental Status:  Alert   PONV Controlled:  Controlled   Airway Patency:  Patent      Post Op Vitals Reviewed: Yes      Staff: CRNA         No notable events documented      /79   Temp   97   Pulse  88   Resp   10   SpO2   96
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
## 2022-11-17 ENCOUNTER — TELEPHONE (OUTPATIENT)
Dept: OTHER | Facility: HOSPITAL | Age: 67
End: 2022-11-17

## 2022-11-17 VITALS
TEMPERATURE: 98.5 F | RESPIRATION RATE: 18 BRPM | SYSTOLIC BLOOD PRESSURE: 130 MMHG | OXYGEN SATURATION: 97 % | HEART RATE: 86 BPM | DIASTOLIC BLOOD PRESSURE: 79 MMHG

## 2022-11-17 LAB
ANION GAP SERPL CALCULATED.3IONS-SCNC: 5 MMOL/L (ref 4–13)
BACTERIA UR CULT: ABNORMAL
BACTERIA UR CULT: ABNORMAL
BASOPHILS # BLD AUTO: 0.04 THOUSANDS/ÂΜL (ref 0–0.1)
BASOPHILS NFR BLD AUTO: 1 % (ref 0–1)
BUN SERPL-MCNC: 13 MG/DL (ref 5–25)
CALCIUM SERPL-MCNC: 7.6 MG/DL (ref 8.4–10.2)
CHLORIDE SERPL-SCNC: 110 MMOL/L (ref 96–108)
CO2 SERPL-SCNC: 25 MMOL/L (ref 21–32)
CREAT SERPL-MCNC: 0.78 MG/DL (ref 0.6–1.3)
EOSINOPHIL # BLD AUTO: 0.01 THOUSAND/ÂΜL (ref 0–0.61)
EOSINOPHIL NFR BLD AUTO: 0 % (ref 0–6)
ERYTHROCYTE [DISTWIDTH] IN BLOOD BY AUTOMATED COUNT: 12.2 % (ref 11.6–15.1)
GFR SERPL CREATININE-BSD FRML MDRD: 78 ML/MIN/1.73SQ M
GLUCOSE SERPL-MCNC: 125 MG/DL (ref 65–140)
HCT VFR BLD AUTO: 34.6 % (ref 34.8–46.1)
HGB BLD-MCNC: 11.3 G/DL (ref 11.5–15.4)
IMM GRANULOCYTES # BLD AUTO: 0.04 THOUSAND/UL (ref 0–0.2)
IMM GRANULOCYTES NFR BLD AUTO: 1 % (ref 0–2)
LYMPHOCYTES # BLD AUTO: 1.89 THOUSANDS/ÂΜL (ref 0.6–4.47)
LYMPHOCYTES NFR BLD AUTO: 22 % (ref 14–44)
MCH RBC QN AUTO: 30.3 PG (ref 26.8–34.3)
MCHC RBC AUTO-ENTMCNC: 32.7 G/DL (ref 31.4–37.4)
MCV RBC AUTO: 93 FL (ref 82–98)
MONOCYTES # BLD AUTO: 0.68 THOUSAND/ÂΜL (ref 0.17–1.22)
MONOCYTES NFR BLD AUTO: 8 % (ref 4–12)
NEUTROPHILS # BLD AUTO: 6.04 THOUSANDS/ÂΜL (ref 1.85–7.62)
NEUTS SEG NFR BLD AUTO: 68 % (ref 43–75)
NRBC BLD AUTO-RTO: 0 /100 WBCS
PLATELET # BLD AUTO: 319 THOUSANDS/UL (ref 149–390)
PMV BLD AUTO: 9.2 FL (ref 8.9–12.7)
POTASSIUM SERPL-SCNC: 3.3 MMOL/L (ref 3.5–5.3)
RBC # BLD AUTO: 3.73 MILLION/UL (ref 3.81–5.12)
SODIUM SERPL-SCNC: 140 MMOL/L (ref 135–147)
WBC # BLD AUTO: 8.7 THOUSAND/UL (ref 4.31–10.16)

## 2022-11-17 RX ORDER — POTASSIUM CHLORIDE 20 MEQ/1
40 TABLET, EXTENDED RELEASE ORAL ONCE
Status: COMPLETED | OUTPATIENT
Start: 2022-11-17 | End: 2022-11-17

## 2022-11-17 RX ORDER — TAMSULOSIN HYDROCHLORIDE 0.4 MG/1
0.4 CAPSULE ORAL
Qty: 30 CAPSULE | Refills: 0 | Status: SHIPPED | OUTPATIENT
Start: 2022-11-17 | End: 2022-12-17

## 2022-11-17 RX ORDER — OXYCODONE HYDROCHLORIDE 5 MG/1
5 TABLET ORAL EVERY 6 HOURS PRN
Qty: 10 TABLET | Refills: 0 | Status: SHIPPED | OUTPATIENT
Start: 2022-11-17 | End: 2022-11-30 | Stop reason: ALTCHOICE

## 2022-11-17 RX ORDER — ECHINACEA PURPUREA EXTRACT 125 MG
1 TABLET ORAL
Status: DISCONTINUED | OUTPATIENT
Start: 2022-11-17 | End: 2022-11-17 | Stop reason: HOSPADM

## 2022-11-17 RX ORDER — POLYETHYLENE GLYCOL 3350 17 G/17G
17 POWDER, FOR SOLUTION ORAL DAILY
Status: DISCONTINUED | OUTPATIENT
Start: 2022-11-17 | End: 2022-11-17 | Stop reason: HOSPADM

## 2022-11-17 RX ADMIN — HEPARIN SODIUM 5000 UNITS: 5000 INJECTION INTRAVENOUS; SUBCUTANEOUS at 06:01

## 2022-11-17 RX ADMIN — SALINE NASAL SPRAY 1 SPRAY: 1.5 SOLUTION NASAL at 08:57

## 2022-11-17 RX ADMIN — OXYCODONE HYDROCHLORIDE 5 MG: 5 TABLET ORAL at 06:02

## 2022-11-17 RX ADMIN — PANTOPRAZOLE SODIUM 40 MG: 40 TABLET, DELAYED RELEASE ORAL at 06:01

## 2022-11-17 RX ADMIN — POTASSIUM CHLORIDE 40 MEQ: 1500 TABLET, EXTENDED RELEASE ORAL at 12:50

## 2022-11-17 RX ADMIN — AZTREONAM 2000 MG: 2 INJECTION, POWDER, LYOPHILIZED, FOR SOLUTION INTRAMUSCULAR; INTRAVENOUS at 05:46

## 2022-11-17 RX ADMIN — CALCIUM 1 TABLET: 500 TABLET ORAL at 08:57

## 2022-11-17 RX ADMIN — POLYETHYLENE GLYCOL 3350 17 G: 17 POWDER, FOR SOLUTION ORAL at 08:57

## 2022-11-17 RX ADMIN — CHOLECALCIFEROL TAB 25 MCG (1000 UNIT) 2000 UNITS: 25 TAB at 08:57

## 2022-11-17 RX ADMIN — OXYCODONE HYDROCHLORIDE 2.5 MG: 5 TABLET ORAL at 12:49

## 2022-11-17 NOTE — ASSESSMENT & PLAN NOTE
·  flank pain with radiologic findings consistent with  Severe left-sided hydroureteronephrosis with a 7 mm obstructing stone in the proximal left ureter    Moderate left-sided perinephric stranding and left-sided forniceal rupture   · Status post cystoscopy retrograde pyelogram with a stent insertion, uneventful  · Adequate voiding, kidney function is stable at baseline  · Stable from medicine standpoint for discharge    PLAN:  Continue pain control  Oxybutynin added  Continue follow-up with primary care physician  Follow-up with urology set up

## 2022-11-17 NOTE — PLAN OF CARE
Problem: PAIN - ADULT  Goal: Verbalizes/displays adequate comfort level or baseline comfort level  Description: Interventions:  - Encourage patient to monitor pain and request assistance  - Assess pain using appropriate pain scale  - Administer analgesics based on type and severity of pain and evaluate response  - Implement non-pharmacological measures as appropriate and evaluate response  - Consider cultural and social influences on pain and pain management  - Notify physician/advanced practitioner if interventions unsuccessful or patient reports new pain  Outcome: Completed     Problem: INFECTION - ADULT  Goal: Absence or prevention of progression during hospitalization  Description: INTERVENTIONS:  - Assess and monitor for signs and symptoms of infection  - Monitor lab/diagnostic results  - Monitor all insertion sites, i e  indwelling lines, tubes, and drains  - Monitor endotracheal if appropriate and nasal secretions for changes in amount and color  - [place] appropriate cooling/warming therapies per order  - Administer medications as ordered  - Instruct and encourage patient and family to use good hand hygiene technique  - Identify and instruct in appropriate isolation precautions for identified infection/condition  Outcome: Completed  Goal: Absence of fever/infection during neutropenic period  Description: INTERVENTIONS:  - Monitor WBC    Outcome: Completed     Problem: SAFETY ADULT  Goal: Patient will remain free of falls  Description: INTERVENTIONS:  - Educate patient/family on patient safety including physical limitations  - Instruct patient to call for assistance with activity   - Consult OT/PT to assist with strengthening/mobility   - Keep Call bell within reach  - Keep bed low and locked with side rails adjusted as appropriate  - Keep care items and personal belongings within reach  - Initiate and maintain comfort rounds  - Make Fall Risk Sign visible to staff  - Offer Toileting every  Hours, in advance of need  - Initiate/Maintain alarm  - Obtain necessary fall risk management equipment:   - Apply yellow socks and bracelet for high fall risk patients  - Consider moving patient to room near nurses station  Outcome: Completed  Goal: Maintain or return to baseline ADL function  Description: INTERVENTIONS:  -  Assess patient's ability to carry out ADLs; assess patient's baseline for ADL function and identify physical deficits which impact ability to perform ADLs (bathing, care of mouth/teeth, toileting, grooming, dressing, etc )  - Assess/evaluate cause of self-care deficits   - Assess range of motion  - Assess patient's mobility; develop plan if impaired  - Assess patient's need for assistive devices and provide as appropriate  - Encourage maximum independence but intervene and supervise when necessary  - Involve family in performance of ADLs  - Assess for home care needs following discharge   - Consider OT consult to assist with ADL evaluation and planning for discharge  - Provide patient education as appropriate  Outcome: Completed  Goal: Maintains/Returns to pre admission functional level  Description: INTERVENTIONS:  - Perform BMAT or MOVE assessment daily    - Set and communicate daily mobility goal to care team and patient/family/caregiver  - Collaborate with rehabilitation services on mobility goals if consulted  - Perform Range of Motion  times a day  - Reposition patient every  hours    - Dangle patient  times a day  - Stand patient  times a day  - Ambulate patient  times a day  - Out of bed to chair  times a day   - Out of bed for meals mes a day  - Out of bed for toileting  - Record patient progress and toleration of activity level   Outcome: Completed     Problem: DISCHARGE PLANNING  Goal: Discharge to home or other facility with appropriate resources  Description: INTERVENTIONS:  - Identify barriers to discharge w/patient and caregiver  - Arrange for needed discharge resources and transportation as appropriate  - Identify discharge learning needs (meds, wound care, etc )  - Arrange for interpretive services to assist at discharge as needed  - Refer to Case Management Department for coordinating discharge planning if the patient needs post-hospital services based on physician/advanced practitioner order or complex needs related to functional status, cognitive ability, or social support system  Outcome: Completed     Problem: Knowledge Deficit  Goal: Patient/family/caregiver demonstrates understanding of disease process, treatment plan, medications, and discharge instructions  Description: Complete learning assessment and assess knowledge base    Interventions:  - Provide teaching at level of understanding  - Provide teaching via preferred learning methods  Outcome: Completed

## 2022-11-17 NOTE — TELEPHONE ENCOUNTER
Patient had left stent inserted 11/16/22 for L ureteral stone  Pt discharged 11/17/22   Please call to schedule follow up and outpatient cystoscopy ureteroscopy holmium laser lithotripsy retrograde pyelogram
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
## 2022-11-17 NOTE — PLAN OF CARE
Problem: INFECTION - ADULT  Goal: Absence of fever/infection during neutropenic period  Description: INTERVENTIONS:  - Monitor WBC    Outcome: Progressing     Problem: SAFETY ADULT  Goal: Patient will remain free of falls  Description: INTERVENTIONS:  - Educate patient/family on patient safety including physical limitations  - Instruct patient to call for assistance with activity   - Consult OT/PT to assist with strengthening/mobility   - Keep Call bell within reach  - Keep bed low and locked with side rails adjusted as appropriate  - Keep care items and personal belongings within reach  - Initiate and maintain comfort rounds  - Make Fall Risk Sign visible to staff  - Offer Toileting every    Hours, in advance of need  - Initiate/Maintain   alarm  - Obtain necessary fall risk management equipment:     - Apply yellow socks and bracelet for high fall risk patients  - Consider moving patient to room near nurses station  Outcome: Progressing     Problem: SAFETY ADULT  Goal: Patient will remain free of falls  Description: INTERVENTIONS:  - Educate patient/family on patient safety including physical limitations  - Instruct patient to call for assistance with activity   - Consult OT/PT to assist with strengthening/mobility   - Keep Call bell within reach  - Keep bed low and locked with side rails adjusted as appropriate  - Keep care items and personal belongings within reach  - Initiate and maintain comfort rounds  - Make Fall Risk Sign visible to staff  - Offer Toileting every    Hours, in advance of need  - Initiate/Maintain   alarm  - Obtain necessary fall risk management equipment:       - Apply yellow socks and bracelet for high fall risk patients  - Consider moving patient to room near nurses station  Outcome: Progressing

## 2022-11-17 NOTE — DISCHARGE SUMMARY
[facility]  Discharge- Allegra Cones 1955, 75 y o  female MRN: [id number]  Unit/Bed#: S -01 Encounter: [id number]  Primary Care Provider: Mary Pan DO   Date and time admitted to hospital: 11/16/2022  2:31 AM    * Sepsis [facility])  Assessment & Plan  Meeting criteria in the setting of elevated wbc's, tachycardia , elevated lactic acid  culprit Left sided hydroureteronephrosis  U/A: RBC 10-20  Initial antibiotic therapy aztreonam ( h/o penicillin allergy) eventually discontinue it by urology team  Positive clinical response after cystoscopy retrograde pyelogram with stent insertion , after procedure being monitored off of antibiotics, has remained afebrile with hemodynamic stability no longer met sepsis criteria  Resolved          Hydroureteronephrosis  Assessment & Plan  ·  flank pain with radiologic findings consistent with  Severe left-sided hydroureteronephrosis with a 7 mm obstructing stone in the proximal left ureter  Moderate left-sided perinephric stranding and left-sided forniceal rupture   · Status post cystoscopy retrograde pyelogram with a stent insertion, uneventful  · Adequate voiding, kidney function is stable at baseline  · Stable from medicine standpoint for discharge    PLAN:  Continue pain control  Oxybutynin added  Continue follow-up with primary care physician  Follow-up with urology set up        Vitamin D deficiency  Assessment & Plan  Present on admission    PLAN:  Continue home medications  Osteoporosis  Assessment & Plan  Present on admission    PLAN:  Continue home medications         Esophageal reflux  Assessment & Plan  Continue pantoprazole          Discharging Resident Physician: Kasandra Guerra MD  Attending: Genie Ray MD  PCP: Mary Pan DO  Admission Date: 11/16/2022  Discharge Date: 11/17/22    Disposition:     Home    Reason for Admission:  Sepsis in the setting of severe left-sided hydronephrosis    Consultations During Hospital Stay:  · Urology    Procedures Performed:     · Cystoscopy retrograde pyelogram with stent insertion    Significant Findings / Test Results:     · CT of abdomen and pelvis : Severe left-sided hydroureteronephrosis with a 7 mm obstructing stone in the proximal left ureter  Moderate left-sided perinephric stranding and left-sided forniceal rupture    Incidental Findings:   · None    Test Results Pending at Discharge (will require follow up): · None     Outpatient Tests Requested:  · None    Complications:  No    Hospital Course:     Don Gamez is a 75 y o  female patient who originally presented to the hospital on 11/16/2022 due to flank pain  Patient has an underlying history of gastroesophageal reflux, presented to the hospital for severe left-sided flank pain, with GI intolerance of) nausea and emesis)  Patient reported on admission a history of nephrolithiasis with spontaneous passage  During the evaluation in the emergency department imaging showed severe hydronephrosis with a 7 mm stone, she met sepsis criteria on admission, urology team was consulted, she underwent to cystoscopy retrograde pyelogram with stent insertion which was successful, was monitored off of antibiotic therapy after the procedure, kidney function returned back to baseline, patient voiding and having bowel movements adequately, remained afebrile hemodynamically stable  Stable from medicine standpoint for discharge with above recommendations  Condition at Discharge: good     Discharge Day Visit / Exam:     Subjective:  No overnight events, pain controlled, adequate voiding, adequate diet tolerance  Vitals: Blood Pressure: 130/79 (11/17/22 0711)  Pulse: 86 (11/17/22 0711)  Temperature: 98 5 °F (36 9 °C) (11/17/22 0711)  Temp Source: Oral (11/16/22 2334)  Respirations: 18 (11/16/22 2334)  SpO2: 97 % (11/17/22 0711)  Exam:   Physical Exam  Vitals and nursing note reviewed     Constitutional:       General: She is not in acute distress  Appearance: She is normal weight  She is not ill-appearing, toxic-appearing or diaphoretic  HENT:      Head: Normocephalic and atraumatic  Right Ear: Tympanic membrane normal       Left Ear: Tympanic membrane normal       Nose: Nose normal       Mouth/Throat:      Mouth: Mucous membranes are moist    Eyes:      Extraocular Movements: Extraocular movements intact  Conjunctiva/sclera: Conjunctivae normal       Pupils: Pupils are equal, round, and reactive to light  Cardiovascular:      Rate and Rhythm: Normal rate  Pulses: Normal pulses  Heart sounds: No murmur heard  No gallop  Pulmonary:      Effort: Pulmonary effort is normal  No respiratory distress  Breath sounds: No wheezing or rales  Chest:      Chest wall: No tenderness  Abdominal:      General: Bowel sounds are normal  There is no distension  Palpations: Abdomen is soft  Tenderness: There is no abdominal tenderness  Musculoskeletal:         General: Normal range of motion  Cervical back: Normal range of motion  Right lower leg: No edema  Left lower leg: No edema  Skin:     General: Skin is warm  Capillary Refill: Capillary refill takes 2 to 3 seconds  Neurological:      Mental Status: She is alert  Psychiatric:         Mood and Affect: Mood normal          Behavior: Behavior normal          Thought Content: Thought content normal          Judgment: Judgment normal            Discussion with Family:  Patient declined    Discharge instructions/Information to patient and family:   See after visit summary for information provided to patient and family  Provisions for Follow-Up Care:  See after visit summary for information related to follow-up care and any pertinent home health orders  Planned Readmission: no     Discharge Medications:  See after visit summary for reconciled discharge medications provided to patient and family        ** Please Note: This note has been constructed using a voice recognition system    Nutrition Assessment and Intervention:     Reviewed food recall journal      Physical Activity Assessment and Intervention:    Activity journal reviewed      Emotional and Mental Well-being, Sleep, Connectedness Assessment and Intervention:    Sleep/stress assessment performed      Tobacco and Toxic Substance Assessment and Intervention:     Tobacco use screening performed    Alcohol and drug use screening performed

## 2022-11-17 NOTE — DISCHARGE INSTR - AVS FIRST PAGE
Dear Brit Webber,     It was our pleasure to care for you here at [facility]  It is our hope that we were always able to exceed the expected standards for your care during your stay  You were hospitalized due to back pain ( flank)  You were cared for on the 3rd floor by Gudelia Jacome MD under the service of Ildefonso Ortiz [facility] with [facility] who covers for your primary care physician (PCP), Vinicio Weber DO, while you were hospitalized  If you have any questions or concerns related to this hospitalization, you may contact us at [phone]  For follow up as well as any medication refills, we recommend that you follow up with your primary care physician  A registered nurse will reach out to you by phone within a few days after your discharge to answer any additional questions that you may have after going home  However, at this time we provide for you here, the most important instructions / recommendations at discharge:     Notable Medication Adjustments -   Oxybutynin was added for aid bladder spasms   Oxycodone added for pain control  Testing Required after Discharge -   None  Important follow up information -   Please follow-up as instructed below  Other Instructions -   Please continue home medications  Please continue oxybutynin oxycodone as instructed for pain control  A follow-up visit with Urology team has been pre establish please make it effective  Please continue follow-up with your primary care physician  Please review this entire after visit summary as additional general instructions including medication list, appointments, activity, diet, any pertinent wound care, and other additional recommendations from your care team that may be provided for you        Sincerely,     Gudelia Jacome MD

## 2022-11-17 NOTE — ASSESSMENT & PLAN NOTE
Meeting criteria in the setting of elevated wbc's, tachycardia , elevated lactic acid  culprit Left sided hydroureteronephrosis  U/A: RBC 10-20  Initial antibiotic therapy aztreonam ( h/o penicillin allergy) eventually discontinue it by urology team  Positive clinical response after cystoscopy retrograde pyelogram with stent insertion , after procedure being monitored off of antibiotics, has remained afebrile with hemodynamic stability no longer met sepsis criteria    Resolved

## 2022-11-18 ENCOUNTER — TRANSITIONAL CARE MANAGEMENT (OUTPATIENT)
Dept: FAMILY MEDICINE CLINIC | Facility: MEDICAL CENTER | Age: 67
End: 2022-11-18

## 2022-11-20 LAB
BACTERIA BLD CULT: NORMAL
BACTERIA BLD CULT: NORMAL

## 2022-11-21 LAB
BACTERIA BLD CULT: NORMAL
BACTERIA BLD CULT: NORMAL

## 2022-11-21 NOTE — TELEPHONE ENCOUNTER
2nd attempt: CALLED PT LM TO CALL BACK AND SCHEDULE  WHEN PT CALLS BACK PLEASE SCHEDULE AS NOTED BELOW

## 2022-11-22 ENCOUNTER — TELEPHONE (OUTPATIENT)
Dept: OTHER | Facility: OTHER | Age: 67
End: 2022-11-22

## 2022-11-22 NOTE — TELEPHONE ENCOUNTER
Doris Ayala 1 minute ago (3:13 PM)     HS  Pt is returning call again from the office         To  [address] H&P ONLY  STENT WILL STAY IN PLACE UNTIL 2NDRY SURGERY  PLEASE SCHEDULE PT AS NOTED BELOW ON A SAMEDAY/NEXT DAY SPOT IF NEEDED   Please call back          Note

## 2022-11-22 NOTE — TELEPHONE ENCOUNTER
Pt is returning call again from the office       To  [address] H&P ONLY  STENT WILL STAY IN PLACE UNTIL 2NDRY SURGERY  PLEASE SCHEDULE PT AS NOTED BELOW ON A SAMEDAY/NEXT DAY SPOT IF NEEDED   Please call back

## 2022-11-22 NOTE — TELEPHONE ENCOUNTER
Call from patient advising she is calling to schedule her next surgery to have her kidney stone and stent removed and she states it was to be in 2 weeks from her surgery last Wednesday  Please call to schedule

## 2022-11-22 NOTE — TELEPHONE ENCOUNTER
CALLED PT LM TO CALL BACK AND SCHEDULE H&P ONLY  STENT WILL STAY IN PLACE UNTIL 2NDRY SURGERY  PLEASE SCHEDULE PT AS NOTED BELOW ON A SAMEDAY/NEXT DAY SPOT IF NEEDED

## 2022-11-22 NOTE — TELEPHONE ENCOUNTER
Pt called the office back  First available not until 12/30  Pt stated that she was told she needs this removed in less than 2 weeks  Pt has stent  Please advise

## 2022-11-23 DIAGNOSIS — R10.9 RIGHT FLANK PAIN: Primary | ICD-10-CM

## 2022-11-23 DIAGNOSIS — R10.9 RIGHT FLANK PAIN: ICD-10-CM

## 2022-11-23 RX ORDER — TRAMADOL HYDROCHLORIDE 50 MG/1
50-100 TABLET ORAL EVERY 6 HOURS PRN
Qty: 30 TABLET | Refills: 1 | Status: SHIPPED | OUTPATIENT
Start: 2022-11-23 | End: 2022-11-30 | Stop reason: ALTCHOICE

## 2022-11-23 RX ORDER — OXYCODONE HYDROCHLORIDE 5 MG/1
5 TABLET ORAL EVERY 6 HOURS PRN
Qty: 10 TABLET | Refills: 0 | OUTPATIENT
Start: 2022-11-23

## 2022-11-23 RX ORDER — OXYCODONE HYDROCHLORIDE 5 MG/1
5 TABLET ORAL EVERY 6 HOURS PRN
Qty: 10 TABLET | Refills: 0 | Status: CANCELLED | OUTPATIENT
Start: 2022-11-23

## 2022-11-23 NOTE — TELEPHONE ENCOUNTER
Patient called back in regards to scheduling H and P for second surgery  Not able to schedule patient within an appropriate time frame  Please review and call patient      Patient can be reached at [phone]

## 2022-11-23 NOTE — TELEPHONE ENCOUNTER
Pt called and confirm H&P appt @ [facility] disregard last encounter due to being scheduled already

## 2022-11-23 NOTE — TELEPHONE ENCOUNTER
Dr Trino Francis patient  Asking for short supply or Oxicodone , seen in ER for large kidney stone  Will having stone removed in December  Out of the pain medication  Can you refill it?  She has seen Urology in the office as of yet

## 2022-11-23 NOTE — TELEPHONE ENCOUNTER
Cash Serna 2 minutes ago (8:57 AM)     GS  Summary: pt needs h and p appointment  Patient called back in regards to scheduling H and P for second surgery  Not able to schedule patient within an appropriate time frame  Please review and call patient      Patient can be reached at [phone]         Note         Discussed this with in office PA's  Case request was previously placed already, they do not feel an in office H&P is necessary   recommending routing to surgical scheduler to get scheduled for second surgery

## 2022-11-23 NOTE — TELEPHONE ENCOUNTER
Spoke with patient to schedule surgery, patient confirmed 12/22/2022 with Dr Aris Sanderson at [facility]  H&P will be on admit as per last note  Verbal instructions given, patient will need a EKG & UC 2 weeks prior, and is aware the labs are in the system for her  Surgery packet with all information mailed out today 11/23/2022

## 2022-11-25 ENCOUNTER — APPOINTMENT (OUTPATIENT)
Dept: LAB | Facility: CLINIC | Age: 67
End: 2022-11-25

## 2022-11-25 ENCOUNTER — CLINICAL SUPPORT (OUTPATIENT)
Dept: URGENT CARE | Age: 67
End: 2022-11-25
Payer: MEDICARE

## 2022-11-25 DIAGNOSIS — Z01.812 PRE-OPERATIVE LABORATORY EXAMINATION: ICD-10-CM

## 2022-11-25 DIAGNOSIS — N20.1 LEFT URETERAL STONE: ICD-10-CM

## 2022-11-25 DIAGNOSIS — Z79.899 ENCOUNTER FOR MONITORING DENOSUMAB THERAPY: ICD-10-CM

## 2022-11-25 DIAGNOSIS — R39.89 SUSPECTED UTI: ICD-10-CM

## 2022-11-25 DIAGNOSIS — Z01.810 PRE-OPERATIVE CARDIOVASCULAR EXAMINATION: ICD-10-CM

## 2022-11-25 DIAGNOSIS — Z51.81 ENCOUNTER FOR MONITORING DENOSUMAB THERAPY: ICD-10-CM

## 2022-11-25 LAB
ANION GAP SERPL CALCULATED.3IONS-SCNC: 6 MMOL/L (ref 4–13)
BUN SERPL-MCNC: 11 MG/DL (ref 5–25)
CALCIUM SERPL-MCNC: 9.3 MG/DL (ref 8.3–10.1)
CHLORIDE SERPL-SCNC: 106 MMOL/L (ref 96–108)
CO2 SERPL-SCNC: 27 MMOL/L (ref 21–32)
CREAT SERPL-MCNC: 0.93 MG/DL (ref 0.6–1.3)
GFR SERPL CREATININE-BSD FRML MDRD: 63 ML/MIN/1.73SQ M
GLUCOSE P FAST SERPL-MCNC: 80 MG/DL (ref 65–99)
POTASSIUM SERPL-SCNC: 4 MMOL/L (ref 3.5–5.3)
SODIUM SERPL-SCNC: 139 MMOL/L (ref 135–147)

## 2022-11-25 PROCEDURE — 93005 ELECTROCARDIOGRAM TRACING: CPT | Performed by: EMERGENCY MEDICINE

## 2022-11-27 LAB — BACTERIA UR CULT: NORMAL

## 2022-11-28 LAB
ATRIAL RATE: 63 BPM
P AXIS: 0 DEGREES
PR INTERVAL: 152 MS
QRS AXIS: 47 DEGREES
QRSD INTERVAL: 82 MS
QT INTERVAL: 418 MS
QTC INTERVAL: 427 MS
T WAVE AXIS: 37 DEGREES
VENTRICULAR RATE: 63 BPM

## 2022-11-28 NOTE — DISCHARGE INSTRUCTIONS
Ureteral Stent Placement   WHAT YOU NEED TO KNOW:   Ureteral stent placement is a procedure to open a blocked or narrow ureter  The ureter is the tube that carries urine from your kidney into your bladder  A stent is a thin hollow plastic tube used to hold your ureter open and allow urine to flow  The stent may stay in for several weeks  Long-term stents will stay in longer and need to be replaced within a certain period of time  DISCHARGE INSTRUCTIONS:   Seek care immediately if:   You urinate very little or not at all  You have severe pain in your abdomen, even after you take medicine  You have heavy bleeding from your urethra  You see large blood clots in your urine, or your urine is bright red  Contact your healthcare provider or urologist if:   You have a fever or chills  You feel like you need to urinate very often  Your symptoms get worse, or you develop new symptoms  You have questions or concerns about your condition or care  Medicines: You may  need any of the following:  Acetaminophen  decreases pain and fever  It is available without a doctor's order  Ask how much to take and how often to take it  Follow directions  Read the labels of all other medicines you are using to see if they also contain acetaminophen, or ask your doctor or pharmacist  Acetaminophen can cause liver damage if not taken correctly  Do not use more than 4 grams (4,000 milligrams) total of acetaminophen in one day  Prescription pain medicine  may be given  Ask your healthcare provider how to take this medicine safely  Some prescription pain medicines contain acetaminophen  Do not take other medicines that contain acetaminophen without talking to your healthcare provider  Too much acetaminophen may cause liver damage  Prescription pain medicine may cause constipation  Ask your healthcare provider how to prevent or treat constipation  Antibiotics  help prevent or treat bacterial infections   Your healthcare provider may prescribe these for you while you have a ureteral stent  Take your medicine as directed  Contact your healthcare provider if you think your medicine is not helping or if you have side effects  Tell him or her if you are allergic to any medicine  Keep a list of the medicines, vitamins, and herbs you take  Include the amounts, and when and why you take them  Bring the list or the pill bottles to follow-up visits  Carry your medicine list with you in case of an emergency  Self-care:   Drink liquids as directed  Liquids will help flush your urinary tract and prevent infection  Ask your healthcare provider how much liquid to drink each day and which liquids are best for you  Ask when you can return to daily activities  You may be able to return to normal activities the day after your stent placement  Follow up with your urologist as directed: You will need regular follow-up visits with your urologist as long as you have a stent  He or she will check to make sure the stent is working properly  He or she will remove your temporary stent in several weeks  Your provider may do urine cultures to check for infection  Write down your questions so you remember to ask them during your visits  © Copyright [employer] 2022 Information is for End User's use only and may not be sold, redistributed or otherwise used for commercial purposes  All illustrations and images included in CareNotes® are the copyrighted property of [employer]  or [employer]ju [employer]  The above information is an  only  It is not intended as medical advice for individual conditions or treatments  Talk to your doctor, nurse or pharmacist before following any medical regimen to see if it is safe and effective for you

## 2022-11-28 NOTE — TELEPHONE ENCOUNTER
Patient r/s'd to 12/6 with Dr Marta Islas at [facility]  Spoke with patient and let her know the new address and location, patient confirmed
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
## 2022-11-30 ENCOUNTER — ANESTHESIA EVENT (OUTPATIENT)
Dept: PERIOP | Facility: HOSPITAL | Age: 67
End: 2022-11-30

## 2022-11-30 NOTE — PRE-PROCEDURE INSTRUCTIONS
Pre-Surgery Instructions:   Medication Instructions   • Calcium 600 MG tablet Stop taking 7 days prior to surgery  • Cholecalciferol (VITAMIN D) 2000 units CAPS Stop taking 7 days prior to surgery  • omeprazole (PriLOSEC) 20 mg delayed release capsule Take day of surgery  • tamsulosin (FLOMAX) 0 4 mg Take day of surgery  • traZODone (DESYREL) 50 mg tablet Take night before surgery     You will receive a phone call from hospital for arrival time  Please call surgeons office if any changes in your condition  Wear easy on/off clothing; consider type of surgery;  Valuables, jewelry, piercing's please keep at home  **COVID-19  education/surgical guidelines  Updated covid    Visitation policy  Please: No contact lenses or eye make up, artificial eyelashes    Please secure transportation     Follow pre surgery showering or cleaning instructions as  Reviewed by nurse or surgeons office      Questions answered and concerns addressed

## 2022-12-06 ENCOUNTER — APPOINTMENT (OUTPATIENT)
Dept: RADIOLOGY | Facility: HOSPITAL | Age: 67
End: 2022-12-06

## 2022-12-06 ENCOUNTER — HOSPITAL ENCOUNTER (OUTPATIENT)
Facility: HOSPITAL | Age: 67
Setting detail: OUTPATIENT SURGERY
Discharge: HOME | End: 2022-12-06
Attending: UROLOGY | Admitting: UROLOGY

## 2022-12-06 ENCOUNTER — ANESTHESIA (OUTPATIENT)
Dept: PERIOP | Facility: HOSPITAL | Age: 67
End: 2022-12-06

## 2022-12-06 VITALS
OXYGEN SATURATION: 100 % | DIASTOLIC BLOOD PRESSURE: 75 MMHG | HEART RATE: 77 BPM | TEMPERATURE: 96.8 F | RESPIRATION RATE: 18 BRPM | HEIGHT: 63 IN | WEIGHT: 122.8 LBS | BODY MASS INDEX: 21.76 KG/M2 | SYSTOLIC BLOOD PRESSURE: 140 MMHG

## 2022-12-06 DIAGNOSIS — N20.0 KIDNEY STONE ON LEFT SIDE: Primary | ICD-10-CM

## 2022-12-06 RX ORDER — OXYCODONE HYDROCHLORIDE AND ACETAMINOPHEN 5; 325 MG/1; MG/1
1 TABLET ORAL ONCE
Status: COMPLETED | OUTPATIENT
Start: 2022-12-06 | End: 2022-12-06

## 2022-12-06 RX ORDER — SODIUM CHLORIDE 9 MG/ML
125 INJECTION, SOLUTION INTRAVENOUS CONTINUOUS
Status: DISCONTINUED | OUTPATIENT
Start: 2022-12-06 | End: 2022-12-06

## 2022-12-06 RX ORDER — PROPOFOL 10 MG/ML
INJECTION, EMULSION INTRAVENOUS CONTINUOUS PRN
Status: DISCONTINUED | OUTPATIENT
Start: 2022-12-06 | End: 2022-12-06

## 2022-12-06 RX ORDER — OXYCODONE HYDROCHLORIDE AND ACETAMINOPHEN 5; 325 MG/1; MG/1
1 TABLET ORAL EVERY 8 HOURS PRN
Qty: 12 TABLET | Refills: 0 | Status: SHIPPED | OUTPATIENT
Start: 2022-12-06 | End: 2022-12-16

## 2022-12-06 RX ORDER — NEOSTIGMINE METHYLSULFATE 1 MG/ML
INJECTION INTRAVENOUS AS NEEDED
Status: DISCONTINUED | OUTPATIENT
Start: 2022-12-06 | End: 2022-12-06

## 2022-12-06 RX ORDER — LIDOCAINE HYDROCHLORIDE 20 MG/ML
INJECTION, SOLUTION EPIDURAL; INFILTRATION; INTRACAUDAL; PERINEURAL AS NEEDED
Status: DISCONTINUED | OUTPATIENT
Start: 2022-12-06 | End: 2022-12-06

## 2022-12-06 RX ORDER — MAGNESIUM HYDROXIDE 1200 MG/15ML
LIQUID ORAL AS NEEDED
Status: DISCONTINUED | OUTPATIENT
Start: 2022-12-06 | End: 2022-12-06 | Stop reason: HOSPADM

## 2022-12-06 RX ORDER — SULFAMETHOXAZOLE AND TRIMETHOPRIM 800; 160 MG/1; MG/1
1 TABLET ORAL DAILY
Qty: 7 TABLET | Refills: 0 | Status: SHIPPED | OUTPATIENT
Start: 2022-12-06 | End: 2022-12-13

## 2022-12-06 RX ORDER — GLYCOPYRROLATE 0.2 MG/ML
INJECTION INTRAMUSCULAR; INTRAVENOUS AS NEEDED
Status: DISCONTINUED | OUTPATIENT
Start: 2022-12-06 | End: 2022-12-06

## 2022-12-06 RX ORDER — PROPOFOL 10 MG/ML
INJECTION, EMULSION INTRAVENOUS AS NEEDED
Status: DISCONTINUED | OUTPATIENT
Start: 2022-12-06 | End: 2022-12-06

## 2022-12-06 RX ORDER — KETOROLAC TROMETHAMINE 30 MG/ML
INJECTION, SOLUTION INTRAMUSCULAR; INTRAVENOUS AS NEEDED
Status: DISCONTINUED | OUTPATIENT
Start: 2022-12-06 | End: 2022-12-06

## 2022-12-06 RX ORDER — DEXAMETHASONE SODIUM PHOSPHATE 10 MG/ML
INJECTION, SOLUTION INTRAMUSCULAR; INTRAVENOUS AS NEEDED
Status: DISCONTINUED | OUTPATIENT
Start: 2022-12-06 | End: 2022-12-06

## 2022-12-06 RX ORDER — GENTAMICIN SULFATE 60 MG/50ML
2 INJECTION, SOLUTION INTRAVENOUS ONCE
Status: COMPLETED | OUTPATIENT
Start: 2022-12-06 | End: 2022-12-06

## 2022-12-06 RX ORDER — ONDANSETRON 2 MG/ML
4 INJECTION INTRAMUSCULAR; INTRAVENOUS ONCE AS NEEDED
Status: DISCONTINUED | OUTPATIENT
Start: 2022-12-06 | End: 2022-12-06 | Stop reason: HOSPADM

## 2022-12-06 RX ORDER — ONDANSETRON 2 MG/ML
INJECTION INTRAMUSCULAR; INTRAVENOUS AS NEEDED
Status: DISCONTINUED | OUTPATIENT
Start: 2022-12-06 | End: 2022-12-06

## 2022-12-06 RX ORDER — FENTANYL CITRATE 50 UG/ML
INJECTION, SOLUTION INTRAMUSCULAR; INTRAVENOUS AS NEEDED
Status: DISCONTINUED | OUTPATIENT
Start: 2022-12-06 | End: 2022-12-06

## 2022-12-06 RX ORDER — FENTANYL CITRATE/PF 50 MCG/ML
25 SYRINGE (ML) INJECTION
Status: DISCONTINUED | OUTPATIENT
Start: 2022-12-06 | End: 2022-12-06 | Stop reason: HOSPADM

## 2022-12-06 RX ORDER — HEPARIN SODIUM 5000 [USP'U]/ML
5000 INJECTION, SOLUTION INTRAVENOUS; SUBCUTANEOUS ONCE
Status: COMPLETED | OUTPATIENT
Start: 2022-12-06 | End: 2022-12-06

## 2022-12-06 RX ORDER — CIPROFLOXACIN 2 MG/ML
400 INJECTION, SOLUTION INTRAVENOUS ONCE
Status: COMPLETED | OUTPATIENT
Start: 2022-12-06 | End: 2022-12-06

## 2022-12-06 RX ORDER — MIDAZOLAM HYDROCHLORIDE 2 MG/2ML
INJECTION, SOLUTION INTRAMUSCULAR; INTRAVENOUS AS NEEDED
Status: DISCONTINUED | OUTPATIENT
Start: 2022-12-06 | End: 2022-12-06

## 2022-12-06 RX ORDER — ROCURONIUM BROMIDE 10 MG/ML
INJECTION, SOLUTION INTRAVENOUS AS NEEDED
Status: DISCONTINUED | OUTPATIENT
Start: 2022-12-06 | End: 2022-12-06

## 2022-12-06 RX ADMIN — SODIUM CHLORIDE: 0.9 INJECTION, SOLUTION INTRAVENOUS at 16:32

## 2022-12-06 RX ADMIN — OXYCODONE AND ACETAMINOPHEN 1 TABLET: 5; 325 TABLET ORAL at 19:00

## 2022-12-06 RX ADMIN — PROPOFOL 150 MG: 10 INJECTION, EMULSION INTRAVENOUS at 16:03

## 2022-12-06 RX ADMIN — ROCURONIUM BROMIDE 30 MG: 10 INJECTION, SOLUTION INTRAVENOUS at 16:03

## 2022-12-06 RX ADMIN — MIDAZOLAM 2 MG: 1 INJECTION INTRAMUSCULAR; INTRAVENOUS at 15:55

## 2022-12-06 RX ADMIN — PROPOFOL 150 MCG/KG/MIN: 10 INJECTION, EMULSION INTRAVENOUS at 16:03

## 2022-12-06 RX ADMIN — FENTANYL CITRATE 50 MCG: 50 INJECTION INTRAMUSCULAR; INTRAVENOUS at 16:23

## 2022-12-06 RX ADMIN — GLYCOPYRROLATE 0.4 MG: 0.2 INJECTION, SOLUTION INTRAMUSCULAR; INTRAVENOUS at 17:05

## 2022-12-06 RX ADMIN — DEXAMETHASONE SODIUM PHOSPHATE 5 MG: 10 INJECTION INTRAMUSCULAR; INTRAVENOUS at 16:07

## 2022-12-06 RX ADMIN — ONDANSETRON 4 MG: 2 INJECTION INTRAMUSCULAR; INTRAVENOUS at 16:07

## 2022-12-06 RX ADMIN — FENTANYL CITRATE 50 MCG: 50 INJECTION INTRAMUSCULAR; INTRAVENOUS at 16:03

## 2022-12-06 RX ADMIN — NEOSTIGMINE METHYLSULFATE 3 MG: 1 INJECTION INTRAVENOUS at 17:05

## 2022-12-06 RX ADMIN — SODIUM CHLORIDE 125 ML/HR: 0.9 INJECTION, SOLUTION INTRAVENOUS at 13:20

## 2022-12-06 RX ADMIN — CIPROFLOXACIN: 2 INJECTION, SOLUTION INTRAVENOUS at 15:52

## 2022-12-06 RX ADMIN — HEPARIN SODIUM 5000 UNITS: 5000 INJECTION INTRAVENOUS; SUBCUTANEOUS at 15:21

## 2022-12-06 RX ADMIN — GENTAMICIN SULFATE 120 MG: 60 INJECTION, SOLUTION INTRAVENOUS at 16:19

## 2022-12-06 RX ADMIN — KETOROLAC TROMETHAMINE 15 MG: 30 INJECTION, SOLUTION INTRAMUSCULAR; INTRAVENOUS at 17:02

## 2022-12-06 RX ADMIN — LIDOCAINE HYDROCHLORIDE 50 MG: 20 INJECTION, SOLUTION EPIDURAL; INFILTRATION; INTRACAUDAL; PERINEURAL at 16:03

## 2022-12-06 NOTE — ANESTHESIA POSTPROCEDURE EVALUATION
Post-Op Assessment Note    CV Status:  Stable  Pain Score: 1    Pain management: adequate     Mental Status:  Alert and awake   Hydration Status:  Euvolemic   PONV Controlled:  Controlled   Airway Patency:  Patent      Post Op Vitals Reviewed: Yes      Staff: Anesthesiologist         No notable events documented      BP      Temp     Pulse     Resp      SpO2      /79   Pulse 80   Temp (!) 96 8 °F (36 °C) (Temporal)   Resp 20   Ht 5' 3" (1 6 m)   Wt 55 7 kg (122 lb 12 7 oz)   SpO2 100%   BMI 21 75 kg/m²

## 2022-12-06 NOTE — OP NOTE
OPERATIVE REPORT  PATIENT NAME: Mitch Duffy    :  1955  MRN: [id number]  Pt Location: [facility] OR ROOM 02    SURGERY DATE: 2022    Surgeon(s) and Role:     * Allison Medley MD - Primary    Preop Diagnosis:  Left renal stone [N20 1]    Postoperative diagnosis:  Left renal stone    Procedure(s) (LRB):  CYSTO USCOPE LASER, STENT (Left) cystoscopy left retrograde pyelogram left ureteroscopic laser lithotripsy and stent exchange    Specimen(s):  * No specimens in log *    Estimated Blood Loss:   Minimal    Drains:  Ureteral Internal Stent Left ureter 6 Fr  (Active)   Number of days: 20       Ureteral Internal Stent Left ureter 6 Fr  (Active)   Number of days: 0       Anesthesia Type:   General    Operative Indications:  Left ureteral stone [N20 1]  -Left renal stone    Operative Findings:  See operative note below    Complications:   None    Procedure and Technique: I saw the patient in the holding area and discussed the procedure as proposed step-by-step including potential risk complications and answered all of the patient's questions  History and physical was performed and the patient provided both verbal and signed informed consent  The patient was taken to the operating room identified by the surgeon prepped and draped in usual sterile fashion in the dorsolithotomy position after general intubational anesthesia was induced and appropriate timeout accomplished  A 22 Tajik cystoscope with 30 and 70 degree lenses was used to perform cystourethroscopy which revealed a normal urethra without stricture or lesion  The bladder was free of any intrinsic lesions or evidence of extrinsic mass compression  The left ureteral orifice was identified with a stent exiting the ureteral orifice and the right ureteral orifice was identified as being within normal limits with clear reflux and normal position and configuration      Using grasping forceps through the 25 Tajik cystoscope the end of the ureteral stent exiting the left ureteral orifice was grasped and brought out through the urethral meatus with the cystoscope  A 0 035 Garret coated floppy tip guidewire was inserted retrograde up the stent under fluoroscopic control and the stent was removed over the wire  A double-lumen introducer catheter was then placed over the wire and a second 0 035 Garret coated floppy tip guidewire was inserted in the upper urinary tract under fluoroscopic control with a double-lumen introducer being removed  1 wire was used as a safety wire and the other wire used as a working wire  Over the working wire a 10/12 Chinese 35 cm ureteral access sheath was placed into the left ureter with the obturator and working wire being removed  The flexible ureteroscope was then inserted up the access sheath and a large approximately 1 cm stone was encountered in the renal pelvis  The stone migrated back into the midpole calyx and using the holmium laser the stone was dusted into submillimeter particles without difficulty and without damage to the surrounding mucosa  There was no active bleeding  After adequate fragmentation and dusting of the stone the ureteroscope was removed along with the access sheath under direct vision  No lesions or other abnormalities or injuries to the ureter were identified  A large amount of dusted gravel from the stone was left within the renal pelvis  After removal of the ureteroscope and the access sheath the safety wire was used to load a new 6 [place]lyn double-J ureteral stent into the left ureter with 3 curls in the renal pelvis and one curl in the bladder after contrast injection confirmed good positioning  After the stent was internalized the only thing left in the urinary tract was the stent with a transurethral Dangler suture tape to the mons pubis    At the end of the procedure the patient was extubated and transferred to the PACU in good condition having to been discharged that same evening in good condition  There were no complications  The patient will return for stent removal via Dangler suture in 1 week and then undergo renal ultrasound chemistry profile uric acid level PTH level and stone risk profile in 3 months at which time provider visit will be obtained  The patient tolerated the procedure well without complication     I was present for the entire procedure and I was present for all critical portions of the procedure    Patient Disposition:  PACU         SIGNATURE: Leonie Steen MD  DATE: December 6, 2022  TIME: 5:11 PM

## 2022-12-06 NOTE — INTERVAL H&P NOTE
H&P reviewed  After examining the patient I find no changes in the patients condition since the H&P had been written      Vitals:    12/06/22 1324   BP: 139/62   Pulse: 78   Resp: 16   Temp: 98 8 °F (37 1 °C)   SpO2: 99%

## 2022-12-06 NOTE — ANESTHESIA PREPROCEDURE EVALUATION
Procedure:  CYSTO USCOPE LASER, STENT (Left: Ureter)    Relevant Problems   ANESTHESIA   (+) PONV (postoperative nausea and vomiting)      CARDIO   (+) Hyperlipidemia      GI/HEPATIC   (+) Esophageal reflux      /RENAL   (+) Hydroureteronephrosis      MUSCULOSKELETAL   (+) Back pain   (+) Degenerative lumbar disc   (+) Primary generalized (osteo)arthritis   (+) Sacroiliitis (HCC)   (+) Sciatica of right side   (+) Unilateral osteoarthritis of hip, left      Lab Results   Component Value Date     11/24/2017    SODIUM 139 11/25/2022    K 4 0 11/25/2022     11/25/2022    CO2 27 11/25/2022    ANIONGAP 8 10/25/2013    AGAP 6 11/25/2022    BUN 11 11/25/2022    CREATININE 0 93 11/25/2022    GLUC 125 11/17/2022    GLUF 80 11/25/2022    CALCIUM 9 3 11/25/2022    AST 12 (L) 11/16/2022    ALT 7 11/16/2022    ALKPHOS 38 11/16/2022    PROT 6 6 11/24/2017    TP 6 3 (L) 11/16/2022    BILITOT 0 3 11/24/2017    TBILI 0 50 11/16/2022    EGFR 63 11/25/2022     Lab Results   Component Value Date    WBC 8 70 11/17/2022    HGB 11 3 (L) 11/17/2022    HCT 34 6 (L) 11/17/2022    MCV 93 11/17/2022     11/17/2022       Physical Exam    Airway    Mallampati score: II  TM Distance: >3 FB  Neck ROM: full     Dental   No notable dental hx     Cardiovascular  Rhythm: regular, Rate: normal, Cardiovascular exam normal    Pulmonary  Pulmonary exam normal Breath sounds clear to auscultation,     Other Findings        Anesthesia Plan  ASA Score- 2     Anesthesia Type- general with ASA Monitors  Additional Monitors:   Airway Plan:     Comment: Did well with GA/LMA and propofol infusion (W/R to PONV)  Plan Factors-Exercise tolerance (METS): >4 METS  Chart reviewed  EKG reviewed  Imaging results reviewed  Existing labs reviewed  Patient summary reviewed  Patient is not a current smoker  Patient not instructed to abstain from smoking on day of procedure  Patient did not smoke on day of surgery          Induction- intravenous  Postoperative Plan- Plan for postoperative opioid use  Planned trial extubation    Informed Consent- Anesthetic plan and risks discussed with patient and spouse Hetal Field)

## 2022-12-08 ENCOUNTER — TELEPHONE (OUTPATIENT)
Dept: UROLOGY | Facility: MEDICAL CENTER | Age: 67
End: 2022-12-08

## 2022-12-08 DIAGNOSIS — N20.1 LEFT URETERAL STONE: Primary | ICD-10-CM

## 2022-12-08 NOTE — TELEPHONE ENCOUNTER
Post Op Note    Reddy Dahl is a 75 y o  female s/p CYSTO USCOPE LASER, STENT (Left: Ureter)  performed by Dr Chapo Vines on 12/6/22   How would you rate your pain on a scale from 1 to 10, 10 being the worst pain ever? Pt is experiencing discomfort which is getting better  Have you had a fever? No  Have your bowel movements been regular? No   Pt has not had a BM since the surgery  She is taking Miralax  Advised increased water intake and addition of stool softening  Do you have any difficulty urinating? No  If the patient has an incision- do you have any redness around the incision or any drainage from the incision? No incision  If the patient has a drain- are you having any issues with the drain? No drain  If the patient has a almanzar- are you comfortable caring for your almanzar? No almanzar  Do you have any other questions or concerns that I can address at this time? No    Pt is scheduled for stent with string removal on 12/13/22 in [facility] office

## 2022-12-13 ENCOUNTER — PROCEDURE VISIT (OUTPATIENT)
Dept: UROLOGY | Facility: MEDICAL CENTER | Age: 67
End: 2022-12-13

## 2022-12-13 VITALS
WEIGHT: 122 LBS | SYSTOLIC BLOOD PRESSURE: 118 MMHG | HEIGHT: 63 IN | BODY MASS INDEX: 21.62 KG/M2 | DIASTOLIC BLOOD PRESSURE: 72 MMHG | HEART RATE: 91 BPM

## 2022-12-13 DIAGNOSIS — N20.1 LEFT URETERAL STONE: Primary | ICD-10-CM

## 2022-12-13 NOTE — PROGRESS NOTES
12/13/2022  Danielle Honeycutt is a 75 y o  female  [id number]        Diagnosis  Chief Complaint    Left Ureteral Stone         Patient is s/p CYSTO USCOPE LASER, STENT (Left: Ureter {on 12/6/22 with Dr Colby King  Return in 3 mos with renal US; stone risk profile; and Uric Acid, PTH and CMP labs prior      Procedure Stent with String Removal    Vitals:    12/13/22 1058   BP: 118/72   Pulse: 91   Weight: 55 3 kg (122 lb)   Height: 5' 3" (1 6 m)       Stent with string removed intact without difficulty  Reviewed post stent removal symptoms including flank pain, dysuria, and hematuria  Instructed patient to increase oral fluid intake  Encouraged the use of NSAIDS and other prescribed pain medication as needed for discomfort  Patient instructed to call the office or report to the ER for uncontrolled pain, fever, chills, nausea or vomiting         Alberto Valdez RN

## 2022-12-28 ENCOUNTER — RA CDI HCC (OUTPATIENT)
Dept: OTHER | Facility: HOSPITAL | Age: 67
End: 2022-12-28

## 2022-12-28 NOTE — PROGRESS NOTES
[address]  coding opportunities       Chart reviewed, no opportunity found: CHART REVIEWED, NO OPPORTUNITY FOUND        Patients Insurance     Medicare Insurance: Medicare

## 2023-01-05 ENCOUNTER — ANNUAL EXAM (OUTPATIENT)
Dept: FAMILY MEDICINE CLINIC | Facility: MEDICAL CENTER | Age: 68
End: 2023-01-05

## 2023-01-05 VITALS
RESPIRATION RATE: 16 BRPM | HEART RATE: 80 BPM | HEIGHT: 63 IN | BODY MASS INDEX: 22.04 KG/M2 | SYSTOLIC BLOOD PRESSURE: 112 MMHG | WEIGHT: 124.4 LBS | TEMPERATURE: 97.8 F | DIASTOLIC BLOOD PRESSURE: 76 MMHG | OXYGEN SATURATION: 98 %

## 2023-01-05 DIAGNOSIS — Z01.419 WELL WOMAN EXAM WITH ROUTINE GYNECOLOGICAL EXAM: Primary | ICD-10-CM

## 2023-01-10 ENCOUNTER — APPOINTMENT (OUTPATIENT)
Dept: LAB | Facility: CLINIC | Age: 68
End: 2023-01-10

## 2023-01-10 DIAGNOSIS — N20.1 LEFT URETERAL STONE: ICD-10-CM

## 2023-01-10 LAB
ALBUMIN SERPL BCP-MCNC: 4 G/DL (ref 3.5–5)
ALP SERPL-CCNC: 37 U/L (ref 46–116)
ALT SERPL W P-5'-P-CCNC: 17 U/L (ref 12–78)
ANION GAP SERPL CALCULATED.3IONS-SCNC: 6 MMOL/L (ref 4–13)
AST SERPL W P-5'-P-CCNC: 13 U/L (ref 5–45)
BILIRUB SERPL-MCNC: 0.54 MG/DL (ref 0.2–1)
BUN SERPL-MCNC: 11 MG/DL (ref 5–25)
CALCIUM SERPL-MCNC: 9.6 MG/DL (ref 8.3–10.1)
CHLORIDE SERPL-SCNC: 110 MMOL/L (ref 96–108)
CO2 SERPL-SCNC: 26 MMOL/L (ref 21–32)
CREAT SERPL-MCNC: 0.92 MG/DL (ref 0.6–1.3)
GFR SERPL CREATININE-BSD FRML MDRD: 64 ML/MIN/1.73SQ M
GLUCOSE P FAST SERPL-MCNC: 86 MG/DL (ref 65–99)
POTASSIUM SERPL-SCNC: 3.8 MMOL/L (ref 3.5–5.3)
PROT SERPL-MCNC: 7.2 G/DL (ref 6.4–8.4)
PTH-INTACT SERPL-MCNC: 41.7 PG/ML (ref 18.4–80.1)
SODIUM SERPL-SCNC: 142 MMOL/L (ref 135–147)
URATE SERPL-MCNC: 4.7 MG/DL (ref 2–7.5)

## 2023-01-16 PROBLEM — A41.9 SEPSIS (HCC): Status: RESOLVED | Noted: 2022-11-16 | Resolved: 2023-01-16

## 2023-01-19 LAB
AMMONIA 24H UR-MRATE: 6 MEQ/24 HR
AMMONIA UR-SCNC: 6080 UG/DL
CA H2 PHOS DIHYD CRY URNS QL MICRO: 2.17 RATIO (ref 0–3)
CALCIUM 24H UR-MCNC: 10.7 MG/DL
CALCIUM 24H UR-MRATE: 181.9 MG/24 HR (ref 0–320)
CHLORIDE 24H UR-SCNC: 40 MMOL/L
CHLORIDE 24H UR-SRATE: 68 MMOL/24 HR (ref 38–210)
CITRATE 24H UR-MCNC: 265 MG/L
CITRATE 24H UR-MRATE: 451 MG/24 HR (ref 320–1240)
COM CRY STONE QL IR: 3.79 RATIO (ref 0–6)
CREAT 24H UR-MCNC: 42.3 MG/DL
CREAT 24H UR-MRATE: 719.1 MG/24 HR (ref 800–1800)
CYSTINE 24H UR-MCNC: 7.13 MG/L
CYSTINE 24H UR-MRATE: 12.12 MG/24 HR (ref 2.1–58)
MAGNESIUM 24H UR-MRATE: 75 MG/24 HR (ref 12–293)
MAGNESIUM UR-MCNC: 4.4 MG/DL
NA URATE CRY STONE QL IR: 0.96 RATIO (ref 0–4)
OSMOLALITY UR: 211 MOSMOL/KG (ref 300–900)
OXALATE 24H UR-MRATE: 17 MG/24 HR (ref 4–31)
OXALATE UR-MCNC: 10 MG/L
PH 24H UR: 7 [PH] (ref 4.5–8)
PHOSPHATE 24H UR-MCNC: 20.7 MG/DL
PHOSPHATE 24H UR-MRATE: 351.9 MG/24 HR (ref 261–1078)
PLEASE NOTE (STONE RISK): ABNORMAL
POTASSIUM 24H UR-SCNC: 39.4 MMOL/24 HR (ref 14–95)
POTASSIUM UR-SCNC: 23.2 MMOL/L
PRESERVED URINE: 1700 ML/24 HR (ref 600–1600)
SODIUM 24H UR-SCNC: 50 MMOL/L
SODIUM 24H UR-SRATE: 85 MMOL/24 HR (ref 39–258)
SPECIMEN VOL 24H UR: 1700 ML/24 HR (ref 600–1600)
SULFATE 24H UR-MCNC: 10 MEQ/24 HR (ref 0–30)
SULFATE UR-MCNC: 6 MEQ/L
TRI-PHOS CRY STONE MICRO: 0.04 RATIO (ref 0–1)
URATE 24H UR-MCNC: 15.6 MG/DL
URATE 24H UR-MRATE: 265 MG/24 HR (ref 142–713)
URATE DIHYD CRY STONE QL IR: 0.08 RATIO (ref 0–1.2)

## 2023-02-13 ENCOUNTER — HOSPITAL ENCOUNTER (OUTPATIENT)
Dept: RADIOLOGY | Facility: IMAGING CENTER | Age: 68
Discharge: HOME | End: 2023-02-13

## 2023-02-13 DIAGNOSIS — N20.1 LEFT URETERAL STONE: ICD-10-CM

## 2023-02-24 ENCOUNTER — TELEPHONE (OUTPATIENT)
Dept: FAMILY MEDICINE CLINIC | Facility: MEDICAL CENTER | Age: 68
End: 2023-02-24

## 2023-02-24 DIAGNOSIS — E78.01 FAMILIAL HYPERCHOLESTEROLEMIA: Primary | ICD-10-CM

## 2023-02-24 NOTE — TELEPHONE ENCOUNTER
Pt is asking for an order for a lipid panel  She said she had other routine b/w done for her urologist  Please, advise

## 2023-03-08 DIAGNOSIS — K21.9 GASTROESOPHAGEAL REFLUX DISEASE: ICD-10-CM

## 2023-03-08 RX ORDER — OMEPRAZOLE 20 MG/1
CAPSULE, DELAYED RELEASE ORAL
Qty: 90 CAPSULE | Refills: 1 | Status: SHIPPED | OUTPATIENT
Start: 2023-03-08

## 2023-03-13 ENCOUNTER — OFFICE VISIT (OUTPATIENT)
Dept: UROLOGY | Facility: MEDICAL CENTER | Age: 68
End: 2023-03-13

## 2023-03-13 VITALS
BODY MASS INDEX: 22.32 KG/M2 | SYSTOLIC BLOOD PRESSURE: 110 MMHG | HEIGHT: 63 IN | WEIGHT: 126 LBS | HEART RATE: 70 BPM | DIASTOLIC BLOOD PRESSURE: 60 MMHG

## 2023-03-13 DIAGNOSIS — N20.0 NEPHROLITHIASIS: Primary | ICD-10-CM
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
## 2023-03-14 DIAGNOSIS — F51.01 PRIMARY INSOMNIA: ICD-10-CM

## 2023-03-14 RX ORDER — TRAZODONE HYDROCHLORIDE 50 MG/1
TABLET ORAL
Qty: 90 TABLET | Refills: 1 | Status: SHIPPED | OUTPATIENT
Start: 2023-03-14

## 2023-03-16 ENCOUNTER — APPOINTMENT (OUTPATIENT)
Dept: LAB | Facility: CLINIC | Age: 68
End: 2023-03-16

## 2023-03-16 DIAGNOSIS — E78.01 FAMILIAL HYPERCHOLESTEROLEMIA: ICD-10-CM

## 2023-03-16 LAB
CHOLEST SERPL-MCNC: 236 MG/DL
HDLC SERPL-MCNC: 70 MG/DL
LDLC SERPL CALC-MCNC: 146 MG/DL (ref 0–100)
TRIGL SERPL-MCNC: 101 MG/DL

## 2023-04-19 ENCOUNTER — HOSPITAL ENCOUNTER (OUTPATIENT)
Dept: RADIOLOGY | Facility: IMAGING CENTER | Age: 68
Discharge: HOME | End: 2023-04-19

## 2023-04-19 VITALS — HEIGHT: 63 IN | BODY MASS INDEX: 21.79 KG/M2 | WEIGHT: 123 LBS

## 2023-04-19 DIAGNOSIS — Z12.31 ENCOUNTER FOR SCREENING MAMMOGRAM FOR BREAST CANCER: ICD-10-CM
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
## 2023-04-27 ENCOUNTER — APPOINTMENT (OUTPATIENT)
Dept: RADIOLOGY | Facility: CLINIC | Age: 68
End: 2023-04-27

## 2023-04-27 ENCOUNTER — OFFICE VISIT (OUTPATIENT)
Dept: URGENT CARE | Facility: CLINIC | Age: 68
End: 2023-04-27

## 2023-04-27 VITALS
SYSTOLIC BLOOD PRESSURE: 120 MMHG | DIASTOLIC BLOOD PRESSURE: 72 MMHG | TEMPERATURE: 100.8 F | RESPIRATION RATE: 16 BRPM | HEIGHT: 63 IN | HEART RATE: 102 BPM | WEIGHT: 123 LBS | OXYGEN SATURATION: 99 % | BODY MASS INDEX: 21.79 KG/M2

## 2023-04-27 DIAGNOSIS — R05.1 ACUTE COUGH: ICD-10-CM

## 2023-04-27 DIAGNOSIS — R50.9 FEVER, UNSPECIFIED FEVER CAUSE: Primary | ICD-10-CM

## 2023-04-27 DIAGNOSIS — J20.9 ACUTE BRONCHITIS, UNSPECIFIED ORGANISM: ICD-10-CM

## 2023-04-27 LAB
SARS-COV-2 AG UPPER RESP QL IA: NEGATIVE
VALID CONTROL: NORMAL

## 2023-04-27 RX ORDER — AZITHROMYCIN 250 MG/1
TABLET, FILM COATED ORAL
Qty: 6 TABLET | Refills: 0 | Status: SHIPPED | OUTPATIENT
Start: 2023-04-27 | End: 2023-05-01

## 2023-04-27 RX ORDER — BENZONATATE 100 MG/1
100 CAPSULE ORAL 3 TIMES DAILY PRN
Qty: 20 CAPSULE | Refills: 0 | Status: SHIPPED | OUTPATIENT
Start: 2023-04-27 | End: 2023-04-27
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
## 2023-05-02 ENCOUNTER — CLINICAL SUPPORT (OUTPATIENT)
Dept: URGENT CARE | Facility: CLINIC | Age: 68
End: 2023-05-02

## 2023-05-02 ENCOUNTER — LAB (OUTPATIENT)
Dept: LAB | Facility: CLINIC | Age: 68
End: 2023-05-02

## 2023-05-02 ENCOUNTER — ANESTHESIA EVENT (OUTPATIENT)
Dept: PERIOP | Facility: AMBULARY SURGERY CENTER | Age: 68
End: 2023-05-02

## 2023-05-02 DIAGNOSIS — R22.2 MASS ON BACK: ICD-10-CM

## 2023-05-02 DIAGNOSIS — R94.31 EKG ABNORMALITIES: Primary | ICD-10-CM

## 2023-05-02 LAB
ANION GAP SERPL CALCULATED.3IONS-SCNC: 1 MMOL/L (ref 4–13)
ATRIAL RATE: 66 BPM
BUN SERPL-MCNC: 17 MG/DL (ref 5–25)
CALCIUM SERPL-MCNC: 9.5 MG/DL (ref 8.3–10.1)
CHLORIDE SERPL-SCNC: 111 MMOL/L (ref 96–108)
CO2 SERPL-SCNC: 28 MMOL/L (ref 21–32)
CREAT SERPL-MCNC: 0.88 MG/DL (ref 0.6–1.3)
ERYTHROCYTE [DISTWIDTH] IN BLOOD BY AUTOMATED COUNT: 12.2 % (ref 11.6–15.1)
GFR SERPL CREATININE-BSD FRML MDRD: 67 ML/MIN/1.73SQ M
GLUCOSE P FAST SERPL-MCNC: 91 MG/DL (ref 65–99)
HCT VFR BLD AUTO: 40.7 % (ref 34.8–46.1)
HGB BLD-MCNC: 13.5 G/DL (ref 11.5–15.4)
MCH RBC QN AUTO: 30.4 PG (ref 26.8–34.3)
MCHC RBC AUTO-ENTMCNC: 33.2 G/DL (ref 31.4–37.4)
MCV RBC AUTO: 92 FL (ref 82–98)
P AXIS: 68 DEGREES
PLATELET # BLD AUTO: 383 THOUSANDS/UL (ref 149–390)
PMV BLD AUTO: 9.6 FL (ref 8.9–12.7)
POTASSIUM SERPL-SCNC: 4.4 MMOL/L (ref 3.5–5.3)
PR INTERVAL: 172 MS
QRS AXIS: 66 DEGREES
QRSD INTERVAL: 80 MS
QT INTERVAL: 416 MS
QTC INTERVAL: 436 MS
RBC # BLD AUTO: 4.44 MILLION/UL (ref 3.81–5.12)
SODIUM SERPL-SCNC: 140 MMOL/L (ref 135–147)
T WAVE AXIS: 57 DEGREES
VENTRICULAR RATE: 66 BPM
WBC # BLD AUTO: 6.51 THOUSAND/UL (ref 4.31–10.16)
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
## 2023-05-10 ENCOUNTER — OFFICE VISIT (OUTPATIENT)
Dept: RHEUMATOLOGY | Facility: CLINIC | Age: 68
End: 2023-05-10

## 2023-05-10 DIAGNOSIS — M15.0 PRIMARY GENERALIZED (OSTEO)ARTHRITIS: ICD-10-CM

## 2023-05-10 DIAGNOSIS — Z79.899 ENCOUNTER FOR MONITORING DENOSUMAB THERAPY: ICD-10-CM

## 2023-05-10 DIAGNOSIS — M81.8 OTHER OSTEOPOROSIS WITHOUT CURRENT PATHOLOGICAL FRACTURE: Primary | ICD-10-CM

## 2023-05-10 DIAGNOSIS — Z51.81 ENCOUNTER FOR MONITORING DENOSUMAB THERAPY: ICD-10-CM

## 2023-05-11 NOTE — PRE-PROCEDURE INSTRUCTIONS
Pre-Surgery Instructions:   Medication Instructions   • Calcium 600 MG tablet Instructed to stop all Vitamins and Supplements over the counter from now until after surgery   • Cholecalciferol (VITAMIN D) 2000 units CAPS Instructed to stop all Vitamins and Supplements over the counter from now until after surgery   • denosumab (Prolia) 60 mg/mL Instructed to take per normal schedule   • omeprazole (PriLOSEC) 20 mg delayed release capsule Instructed to take per normal schedule including DOS with sips water   • traZODone (DESYREL) 50 mg tablet Instructed to take per normal schedule except DOS    Medication instructions for day surgery reviewed  Please use only a sip of water to take your instructed medications  Avoid all over the counter vitamins, supplements and NSAIDS for one week prior to surgery per anesthesia guidelines  Tylenol is ok to take as needed  You will receive a call one business day prior to surgery with an arrival time and hospital directions  If your surgery is scheduled on a Monday, the hospital will be calling you on the Friday prior to your surgery  If you have not heard from anyone by 8pm, please call the hospital supervisor through the hospital  at [phone]  Billie [address] [phone])  Do not eat or drink anything after midnight the night before your surgery, including candy, mints, lifesavers, or chewing gum  Do not drink alcohol 24hrs before your surgery  Try not to smoke at least 24hrs before your surgery  Follow the pre surgery showering instructions as listed in the [facility] Surgical Experience Booklet” or otherwise provided by your surgeon's office  Do not shave the surgical area 24 hours before surgery  Do not apply any lotions, creams, including makeup, cologne, deodorant, or perfumes after showering on the day of your surgery  No contact lenses, eye make-up, or artificial eyelashes   Remove nail polish, including gel polish, and any artificial, gel, or acrylic nails if possible  Remove all jewelry including rings and body piercing jewelry  Wear causal clothing that is easy to take on and off  Consider your type of surgery  Keep any valuables, jewelry, piercings at home  Please bring any specially ordered equipment (sling, braces) if indicated  Arrange for a responsible person to drive you to and from the hospital on the day of your surgery  Visitor Guidelines discussed  Call the surgeon's office with any new illnesses, exposures, or additional questions prior to surgery  Please reference your [facility] Surgical Experience Booklet” for additional information to prepare for your upcoming surgery

## 2023-05-12 RX ORDER — CEFAZOLIN SODIUM 1 G/50ML
1000 SOLUTION INTRAVENOUS ONCE
Status: DISCONTINUED | OUTPATIENT
Start: 2023-05-15 | End: 2023-05-15

## 2023-05-15 ENCOUNTER — HOSPITAL ENCOUNTER (OUTPATIENT)
Facility: AMBULARY SURGERY CENTER | Age: 68
Setting detail: OUTPATIENT SURGERY
Discharge: HOME | End: 2023-05-15
Attending: SURGERY | Admitting: SURGERY

## 2023-05-15 ENCOUNTER — ANESTHESIA (OUTPATIENT)
Dept: PERIOP | Facility: AMBULARY SURGERY CENTER | Age: 68
End: 2023-05-15

## 2023-05-15 VITALS
OXYGEN SATURATION: 99 % | DIASTOLIC BLOOD PRESSURE: 67 MMHG | HEART RATE: 74 BPM | WEIGHT: 123 LBS | SYSTOLIC BLOOD PRESSURE: 130 MMHG | RESPIRATION RATE: 18 BRPM | BODY MASS INDEX: 21.79 KG/M2 | HEIGHT: 63 IN | TEMPERATURE: 97.8 F

## 2023-05-15 DIAGNOSIS — R22.2 MASS ON BACK: Primary | ICD-10-CM

## 2023-05-15 RX ORDER — OXYCODONE HYDROCHLORIDE 5 MG/1
5 TABLET ORAL EVERY 4 HOURS PRN
Qty: 10 TABLET | Refills: 0 | Status: SHIPPED | OUTPATIENT
Start: 2023-05-15 | End: 2023-05-21

## 2023-05-15 RX ORDER — PROPOFOL 10 MG/ML
INJECTION, EMULSION INTRAVENOUS CONTINUOUS PRN
Status: DISCONTINUED | OUTPATIENT
Start: 2023-05-15 | End: 2023-05-15

## 2023-05-15 RX ORDER — BUPIVACAINE HYDROCHLORIDE AND EPINEPHRINE 2.5; 5 MG/ML; UG/ML
INJECTION, SOLUTION EPIDURAL; INFILTRATION; INTRACAUDAL; PERINEURAL AS NEEDED
Status: DISCONTINUED | OUTPATIENT
Start: 2023-05-15 | End: 2023-05-15 | Stop reason: HOSPADM

## 2023-05-15 RX ORDER — PROPOFOL 10 MG/ML
INJECTION, EMULSION INTRAVENOUS AS NEEDED
Status: DISCONTINUED | OUTPATIENT
Start: 2023-05-15 | End: 2023-05-15

## 2023-05-15 RX ORDER — LIDOCAINE HYDROCHLORIDE 20 MG/ML
INJECTION, SOLUTION EPIDURAL; INFILTRATION; INTRACAUDAL; PERINEURAL AS NEEDED
Status: DISCONTINUED | OUTPATIENT
Start: 2023-05-15 | End: 2023-05-15

## 2023-05-15 RX ORDER — CLINDAMYCIN PHOSPHATE 900 MG/50ML
900 INJECTION INTRAVENOUS ONCE
Status: COMPLETED | OUTPATIENT
Start: 2023-05-15 | End: 2023-05-15

## 2023-05-15 RX ORDER — OXYCODONE HYDROCHLORIDE 5 MG/1
5 TABLET ORAL EVERY 4 HOURS PRN
Status: DISCONTINUED | OUTPATIENT
Start: 2023-05-15 | End: 2023-05-15 | Stop reason: HOSPADM

## 2023-05-15 RX ORDER — SODIUM CHLORIDE, SODIUM LACTATE, POTASSIUM CHLORIDE, CALCIUM CHLORIDE 600; 310; 30; 20 MG/100ML; MG/100ML; MG/100ML; MG/100ML
125 INJECTION, SOLUTION INTRAVENOUS CONTINUOUS
Status: DISCONTINUED | OUTPATIENT
Start: 2023-05-15 | End: 2023-05-15 | Stop reason: HOSPADM

## 2023-05-15 RX ORDER — HYDROMORPHONE HCL/PF 1 MG/ML
0.5 SYRINGE (ML) INJECTION
Status: DISCONTINUED | OUTPATIENT
Start: 2023-05-15 | End: 2023-05-15 | Stop reason: HOSPADM

## 2023-05-15 RX ORDER — ONDANSETRON 2 MG/ML
INJECTION INTRAMUSCULAR; INTRAVENOUS AS NEEDED
Status: DISCONTINUED | OUTPATIENT
Start: 2023-05-15 | End: 2023-05-15

## 2023-05-15 RX ORDER — FENTANYL CITRATE/PF 50 MCG/ML
50 SYRINGE (ML) INJECTION
Status: CANCELLED | OUTPATIENT
Start: 2023-05-15

## 2023-05-15 RX ORDER — SODIUM CHLORIDE, SODIUM LACTATE, POTASSIUM CHLORIDE, CALCIUM CHLORIDE 600; 310; 30; 20 MG/100ML; MG/100ML; MG/100ML; MG/100ML
INJECTION, SOLUTION INTRAVENOUS CONTINUOUS PRN
Status: DISCONTINUED | OUTPATIENT
Start: 2023-05-15 | End: 2023-05-15

## 2023-05-15 RX ORDER — MIDAZOLAM HYDROCHLORIDE 2 MG/2ML
INJECTION, SOLUTION INTRAMUSCULAR; INTRAVENOUS AS NEEDED
Status: DISCONTINUED | OUTPATIENT
Start: 2023-05-15 | End: 2023-05-15

## 2023-05-15 RX ORDER — FENTANYL CITRATE 50 UG/ML
INJECTION, SOLUTION INTRAMUSCULAR; INTRAVENOUS AS NEEDED
Status: DISCONTINUED | OUTPATIENT
Start: 2023-05-15 | End: 2023-05-15

## 2023-05-15 RX ORDER — PHENYLEPHRINE HCL IN 0.9% NACL 1 MG/10 ML
SYRINGE (ML) INTRAVENOUS AS NEEDED
Status: DISCONTINUED | OUTPATIENT
Start: 2023-05-15 | End: 2023-05-15

## 2023-05-15 RX ORDER — DEXAMETHASONE SODIUM PHOSPHATE 10 MG/ML
INJECTION, SOLUTION INTRAMUSCULAR; INTRAVENOUS AS NEEDED
Status: DISCONTINUED | OUTPATIENT
Start: 2023-05-15 | End: 2023-05-15

## 2023-05-15 RX ORDER — ONDANSETRON 2 MG/ML
4 INJECTION INTRAMUSCULAR; INTRAVENOUS EVERY 4 HOURS PRN
Status: DISCONTINUED | OUTPATIENT
Start: 2023-05-15 | End: 2023-05-15 | Stop reason: HOSPADM

## 2023-05-15 RX ADMIN — DEXAMETHASONE SODIUM PHOSPHATE 10 MG: 10 INJECTION, SOLUTION INTRAMUSCULAR; INTRAVENOUS at 14:36

## 2023-05-15 RX ADMIN — FENTANYL CITRATE 50 MCG: 50 INJECTION INTRAMUSCULAR; INTRAVENOUS at 14:33

## 2023-05-15 RX ADMIN — PROPOFOL 130 MCG/KG/MIN: 10 INJECTION, EMULSION INTRAVENOUS at 14:35

## 2023-05-15 RX ADMIN — CLINDAMYCIN PHOSPHATE 900 MG: 900 INJECTION, SOLUTION INTRAVENOUS at 14:38

## 2023-05-15 RX ADMIN — SODIUM CHLORIDE, SODIUM LACTATE, POTASSIUM CHLORIDE, AND CALCIUM CHLORIDE: .6; .31; .03; .02 INJECTION, SOLUTION INTRAVENOUS at 13:30

## 2023-05-15 RX ADMIN — FENTANYL CITRATE 50 MCG: 50 INJECTION INTRAMUSCULAR; INTRAVENOUS at 14:29

## 2023-05-15 RX ADMIN — ONDANSETRON 4 MG: 2 INJECTION INTRAMUSCULAR; INTRAVENOUS at 14:36

## 2023-05-15 RX ADMIN — Medication 200 MCG: at 14:41

## 2023-05-15 RX ADMIN — LIDOCAINE HYDROCHLORIDE 100 MG: 20 INJECTION, SOLUTION EPIDURAL; INFILTRATION; INTRACAUDAL; PERINEURAL at 14:33

## 2023-05-15 RX ADMIN — PROPOFOL 140 MG: 10 INJECTION, EMULSION INTRAVENOUS at 14:33

## 2023-05-15 RX ADMIN — MIDAZOLAM 2 MG: 1 INJECTION INTRAMUSCULAR; INTRAVENOUS at 14:29

## 2023-05-15 NOTE — ANESTHESIA PREPROCEDURE EVALUATION
Procedure:  EXCISION  BIOPSY LESION/MASS BACK (Left: Back)    Relevant Problems   ANESTHESIA   (+) PONV (postoperative nausea and vomiting)      CARDIO   (+) Hyperlipidemia      GI/HEPATIC   (+) Esophageal reflux      MUSCULOSKELETAL   (+) Back pain        Physical Exam    Airway    Mallampati score: II  TM Distance: >3 FB  Neck ROM: full     Dental   Comment: Denies loose teeth,     Cardiovascular  Cardiovascular exam normal    Pulmonary  Pulmonary exam normal     Other Findings  Portions of exam deferred due to low yield and/or unknown COVID status      Anesthesia Plan  ASA Score- 2     Anesthesia Type- general with ASA Monitors  Additional Monitors:   Airway Plan:           Plan Factors-Exercise tolerance (METS): >4 METS  Chart reviewed  Existing labs reviewed  Patient summary reviewed  Patient is not a current smoker  Induction- intravenous  Postoperative Plan-     Informed Consent- Anesthetic plan and risks discussed with patient  I personally reviewed this patient with the CRNA  Discussed and agreed on the Anesthesia Plan with the CRNA  Kirt Oates

## 2023-05-15 NOTE — INTERVAL H&P NOTE
H&P reviewed  After examining the patient I find no changes in the patients condition since the H&P had been written      Vitals:    05/15/23 1310   BP: 124/76   Pulse: 82   Resp: 16   Temp: 98 4 °F (36 9 °C)   SpO2: 95%

## 2023-05-15 NOTE — OP NOTE
OPERATIVE REPORT  PATIENT NAME: Arlene Ybarra    :  1955  MRN: [id number]  Pt [facility]    SURGERY DATE: 5/15/2023    Surgeon(s) and Role:     * Sandy Rainey MD - Assisting    Preop Diagnosis:  Mass on back [R22 2]    Post-Op Diagnosis Codes:     * Mass on back [R22 2]    Procedure(s):  Left - EXCISION  BIOPSY LESION/MASS BACK    Specimen(s):  ID Type Source Tests Collected by Time Destination   1 : left back mass Tissue Soft Tissue, Other TISSUE EXAM Julietaflorian Rose Rexnino, DO 5/15/2023 1448        Estimated Blood Loss:   Minimal    Drains:  * No LDAs found *    Anesthesia Type:   General/local    Operative Indications: Mass on back [R22 2]      Operative Findings:  5 x 3 cm lipomatous mass just underneath the latissimus dorsi muscle  Removed with no specific margin  Height 63 inches weight 56 kg / 123 pounds  BMI 22  ASA 2  Wound class I    Complications:   None    Procedure and Technique:  Patient was brought the operative suite and identified by visualization, conversation, by armband  Sequential compression pumps were placed  She was given clindamycin perioperatively  Once under anesthesia she was then placed with the right side down to help expose the left back/flank area and the palpable mass  This area is prepped and draped in a sterile fashion  Timeout was performed and was assured that the prep was dry  Local was instilled over the palpable lump  Small skin incision was made and the underlying subcutaneous tissue was divided hot cautery down to the lipomatous mass  We get through a very small thin layer of the lateral aspect of the latissimus dorsi muscle  This mass was excised with no specific margin from the surrounding tissues with hot cautery  Hemostasis was assured  3-0 Vicryl was used to close subcutaneous tissue  Irrigation was carried out  Local was instilled    4-0 Monocryl was used to close skin incision in a subcuticular fashion  Wounds and washed and dried  Sterile skin glue was applied  She was awakened in the operating room and returned to the recovery area in stable condition having tolerated the procedure well  I was present for the entire procedure      Patient Disposition:  PACU         SIGNATURE: Vaughn Nassar DO  DATE: May 15, 2023  TIME: 2:50 PM

## 2023-05-15 NOTE — DISCHARGE INSTR - AVS FIRST PAGE
Ice as needed to the incision    May shower starting 5/16    Resume home medications    Call [phone] with questions or concerns

## 2023-05-15 NOTE — ANESTHESIA POSTPROCEDURE EVALUATION
Post-Op Assessment Note    CV Status:  Stable  Pain Score: 0    Pain management: adequate     Mental Status:  Alert and awake   Hydration Status:  Euvolemic   PONV Controlled:  Controlled   Airway Patency:  Patent      Post Op Vitals Reviewed: Yes      Staff: Anesthesiologist, CRNA         There were no known notable events for this encounter      /60 (05/15/23 1504)    Temp 97 5 °F (36 4 °C) (05/15/23 1504)    Pulse 85 (05/15/23 1504)   Resp 12 (05/15/23 1504)    SpO2 99 % (05/15/23 1504)

## 2023-05-30 ENCOUNTER — RA CDI HCC (OUTPATIENT)
Dept: OTHER | Facility: HOSPITAL | Age: 68
End: 2023-05-30

## 2023-05-31 ENCOUNTER — OFFICE VISIT (OUTPATIENT)
Dept: SURGERY | Facility: CLINIC | Age: 68
End: 2023-05-31

## 2023-05-31 DIAGNOSIS — D17.1 LIPOMA OF BACK: Primary | ICD-10-CM
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
## 2023-06-07 ENCOUNTER — OFFICE VISIT (OUTPATIENT)
Dept: FAMILY MEDICINE CLINIC | Facility: MEDICAL CENTER | Age: 68
End: 2023-06-07
Payer: MEDICARE

## 2023-06-07 VITALS
OXYGEN SATURATION: 97 % | DIASTOLIC BLOOD PRESSURE: 70 MMHG | RESPIRATION RATE: 16 BRPM | SYSTOLIC BLOOD PRESSURE: 124 MMHG | BODY MASS INDEX: 22.15 KG/M2 | TEMPERATURE: 97.7 F | HEIGHT: 63 IN | WEIGHT: 125 LBS | HEART RATE: 86 BPM

## 2023-06-07 DIAGNOSIS — Z00.00 MEDICARE ANNUAL WELLNESS VISIT, SUBSEQUENT: Primary | ICD-10-CM

## 2023-06-07 DIAGNOSIS — Z12.31 ENCOUNTER FOR SCREENING MAMMOGRAM FOR MALIGNANT NEOPLASM OF BREAST: ICD-10-CM

## 2023-06-07 PROCEDURE — G0439 PPPS, SUBSEQ VISIT: HCPCS | Performed by: STUDENT IN AN ORGANIZED HEALTH CARE EDUCATION/TRAINING PROGRAM

## 2023-06-07 NOTE — ASSESSMENT & PLAN NOTE
· Immunizations reviewed  · Defers COVID-19 bivalent vaccine  · We will plan for PCV 20 vaccine in November 2025  · Immunizations otherwise up-to-date

## 2023-08-06 PROBLEM — Z00.00 MEDICARE ANNUAL WELLNESS VISIT, SUBSEQUENT: Status: RESOLVED | Noted: 2023-06-07 | Resolved: 2023-08-06

## 2023-08-31 DIAGNOSIS — K21.9 GASTROESOPHAGEAL REFLUX DISEASE: ICD-10-CM

## 2023-08-31 RX ORDER — OMEPRAZOLE 20 MG/1
CAPSULE, DELAYED RELEASE ORAL
Qty: 90 CAPSULE | Refills: 1 | Status: SHIPPED | OUTPATIENT
Start: 2023-08-31

## 2023-09-06 ENCOUNTER — HOSPITAL ENCOUNTER (OUTPATIENT)
Dept: RADIOLOGY | Facility: MEDICAL CENTER | Age: 68
Discharge: HOME | End: 2023-09-06
Payer: MEDICARE

## 2023-09-06 DIAGNOSIS — F51.01 PRIMARY INSOMNIA: ICD-10-CM

## 2023-09-06 DIAGNOSIS — M81.8 OTHER OSTEOPOROSIS WITHOUT CURRENT PATHOLOGICAL FRACTURE: ICD-10-CM

## 2023-09-06 DIAGNOSIS — Z13.820 SCREENING FOR OSTEOPOROSIS: ICD-10-CM

## 2023-09-06 PROCEDURE — 77080 DXA BONE DENSITY AXIAL: CPT

## 2023-09-06 RX ORDER — TRAZODONE HYDROCHLORIDE 50 MG/1
50 TABLET ORAL
Qty: 90 TABLET | Refills: 1 | Status: SHIPPED | OUTPATIENT
Start: 2023-09-06

## 2023-09-19 ENCOUNTER — TELEPHONE (OUTPATIENT)
Age: 68
End: 2023-09-19

## 2023-09-19 NOTE — TELEPHONE ENCOUNTER
The DEXA scan is showing significant improvement in the bone mineral density, and she is responding well to the prolia. We will continue with the injections.

## 2023-09-19 NOTE — TELEPHONE ENCOUNTER
Caller: Pt     Doctor: Leia Dueñas    Reason for call: Pt had the DEXA scan on 9/6 and would like to know If someone can call her letting her know what the results were.      Call back#: [phone]

## 2023-10-21 ENCOUNTER — OFFICE VISIT (OUTPATIENT)
Dept: URGENT CARE | Facility: CLINIC | Age: 68
End: 2023-10-21
Payer: MEDICARE

## 2023-10-21 VITALS
SYSTOLIC BLOOD PRESSURE: 147 MMHG | HEIGHT: 62 IN | HEART RATE: 80 BPM | WEIGHT: 127.6 LBS | RESPIRATION RATE: 20 BRPM | BODY MASS INDEX: 23.48 KG/M2 | DIASTOLIC BLOOD PRESSURE: 93 MMHG | OXYGEN SATURATION: 98 % | TEMPERATURE: 98.8 F

## 2023-10-21 DIAGNOSIS — J20.9 ACUTE BRONCHITIS, UNSPECIFIED ORGANISM: Primary | ICD-10-CM

## 2023-10-21 DIAGNOSIS — J01.40 ACUTE NON-RECURRENT PANSINUSITIS: ICD-10-CM

## 2023-10-21 PROCEDURE — G0463 HOSPITAL OUTPT CLINIC VISIT: HCPCS | Performed by: NURSE PRACTITIONER

## 2023-10-21 PROCEDURE — 99213 OFFICE O/P EST LOW 20 MIN: CPT | Performed by: NURSE PRACTITIONER

## 2023-10-21 RX ORDER — GUAIFENESIN AND CODEINE PHOSPHATE 100; 10 MG/5ML; MG/5ML
5 SOLUTION ORAL 4 TIMES DAILY PRN
Qty: 118 ML | Refills: 0 | Status: SHIPPED | OUTPATIENT
Start: 2023-10-21

## 2023-10-21 RX ORDER — AZITHROMYCIN 250 MG/1
TABLET, FILM COATED ORAL
Qty: 6 TABLET | Refills: 0 | Status: SHIPPED | OUTPATIENT
Start: 2023-10-21 | End: 2023-10-25

## 2023-10-21 RX ORDER — ALBUTEROL SULFATE 90 UG/1
2 AEROSOL, METERED RESPIRATORY (INHALATION) EVERY 4 HOURS PRN
Qty: 18 G | Refills: 1 | Status: SHIPPED | OUTPATIENT
Start: 2023-10-21

## 2023-10-21 RX ORDER — MECLIZINE HYDROCHLORIDE 25 MG/1
TABLET ORAL
COMMUNITY
Start: 2023-08-06

## 2023-10-21 RX ORDER — GUAIFENESIN AND CODEINE PHOSPHATE 100; 10 MG/5ML; MG/5ML
5 SOLUTION ORAL 4 TIMES DAILY PRN
Qty: 118 ML | Refills: 0 | Status: SHIPPED | OUTPATIENT
Start: 2023-10-21 | End: 2023-10-21 | Stop reason: SDUPTHER

## 2023-10-21 NOTE — PATIENT INSTRUCTIONS
Acute Bronchitis   AMBULATORY CARE:   Acute bronchitis  is swelling and irritation in your lungs. It is usually caused by a virus and most often happens in the winter. Bronchitis may also be caused by bacteria or by a chemical irritant, such as smoke. Common symptoms:   Cough that lasts up to 3 weeks    Runny or stuffy nose    Hoarseness, sore throat    Fever    Feeling more tired than usual, and body aches    Wheezing or pain when you breathe or cough    Seek care immediately if:   You cough up blood. Your lips or fingernails turn blue. You feel like you are not getting enough air when you breathe. Call your doctor if:   Your symptoms do not go away or get worse, even after treatment. Your cough does not get better within 4 weeks. You have questions or concerns about your condition or care. Medicines: You may need any of the following:  Cough suppressants  decrease your urge to cough. Decongestants  help loosen mucus in your lungs and make it easier to cough up. This can help you breathe easier. Inhalers  may be given. Your healthcare provider may give you one or more inhalers to help you breathe easier and cough less. An inhaler gives you medicine to open your airways. Ask your healthcare provider to show you how to use your inhaler correctly. Antiviral medicine  treats infections caused by a virus. Antibiotics  may be given if your bronchitis is caused by bacteria or if you have lung condition. Acetaminophen  decreases pain and fever. It is available without a doctor's order. Ask how much to take and how often to take it. Follow directions. Read the labels of all other medicines you are using to see if they also contain acetaminophen, or ask your doctor or pharmacist. Acetaminophen can cause liver damage if not taken correctly. NSAIDs  help decrease swelling and pain or fever. This medicine is available with or without a doctor's order.  NSAIDs can cause stomach bleeding or kidney problems in certain people. If you take blood thinner medicine, always ask your healthcare provider if NSAIDs are safe for you. Always read the medicine label and follow directions. Self-care:   Drink liquids as directed. You may need to drink more liquids than usual to stay hydrated. Ask how much liquid to drink each day and which liquids are best for you. Use a cool mist humidifier. This increases air moisture in your home. This may make it easier for you to breathe and help decrease your cough. Get more rest.  Rest helps your body to heal. Slowly start to do more each day. Rest when you feel it is needed. Prevent acute bronchitis:       Ask about vaccines you may need. Get a flu vaccine each year as soon as recommended, usually in September or October. Ask your healthcare provider if you should also get a pneumonia or COVID-19 vaccine. Your healthcare provider can tell you if you should also get other vaccines, and when to get them. Prevent the spread of germs. You can decrease your risk for acute bronchitis and other illnesses by doing the following:    Wash your hands often with soap and water. Carry germ-killing hand lotion or gel with you. You can use the lotion or gel to clean your hands when soap and water are not available. Do not touch your eyes, nose, or mouth unless you have washed your hands first.    Always cover your mouth when you cough to prevent the spread of germs. It is best to cough into a tissue or your shirt sleeve instead of into your hand. Ask those around you to cover their mouths when they cough. Try to avoid people who have a cold or the flu. If you are sick, stay away from others as much as possible. Avoid irritants in the air. Avoid chemicals, fumes, and dust. Wear a face mask if you must work around dust or fumes. Stay inside on days when air pollution levels are high. If you have allergies, stay inside when pollen counts are high.  Do not use aerosol products, such as spray-on deodorant, bug spray, and hair spray. Do not smoke or be around others who are smoking. Nicotine and other chemicals in cigarettes and cigars can cause lung damage. Ask your healthcare provider for information if you currently smoke and need help to quit. E-cigarettes or smokeless tobacco still contain nicotine. Talk to your healthcare provider before you use these products. Follow up with your doctor as directed:  Write down questions you have so you will remember to ask them during your follow-up visits. © Copyright Akshat Roman 2023 Information is for End User's use only and may not be sold, redistributed or otherwise used for commercial purposes. The above information is an  only. It is not intended as medical advice for individual conditions or treatments. Talk to your doctor, nurse or pharmacist before following any medical regimen to see if it is safe and effective for you. Sinusitis   AMBULATORY CARE:   Sinusitis  is inflammation or infection of your sinuses. Sinusitis is most often caused by a virus. Acute sinusitis may last up to 12 weeks. Chronic sinusitis lasts longer than 12 weeks. Recurrent sinusitis means you have 4 or more infections in 1 year. Common signs and symptoms:   Fever    Pain, pressure, redness, or swelling around the forehead, cheeks, or eyes    Thick yellow or green discharge from your nose    Tenderness when you touch your face over your sinuses    Dry cough that happens mostly at night or when you lie down    Headache and face pain that is worse when you lean forward    Tooth pain, or pain when you chew    Seek care immediately if:   You have trouble breathing or wheezing that is getting worse. You have a stiff neck, a fever, or a bad headache. You cannot open your eye. Your eyeball bulges out or you cannot move your eye.      You are more sleepy than normal, or you notice changes in your ability to think, move, or talk. You have swelling of your forehead or scalp. Call your doctor if:   You have vision changes, such as double vision. Your eye and eyelid are red, swollen, and painful. Your symptoms do not improve or go away after 10 days. You have nausea and are vomiting. Your nose is bleeding. You have questions or concerns about your condition or care. Medicines: Your symptoms may go away on their own. Your healthcare provider may recommend watchful waiting for up to 10 days before starting antibiotics. You may need any of the following:  Acetaminophen  decreases pain and fever. It is available without a doctor's order. Ask how much to take and how often to take it. Follow directions. Read the labels of all other medicines you are using to see if they also contain acetaminophen, or ask your doctor or pharmacist. Acetaminophen can cause liver damage if not taken correctly. NSAIDs , such as ibuprofen, help decrease swelling, pain, and fever. This medicine is available with or without a doctor's order. NSAIDs can cause stomach bleeding or kidney problems in certain people. If you take blood thinner medicine, always ask your healthcare provider if NSAIDs are safe for you. Always read the medicine label and follow directions. Nasal steroid sprays  may help decrease inflammation in your nose and sinuses. Decongestants  help reduce swelling and drain mucus in the nose and sinuses. They may help you breathe easier. Antihistamines  help dry mucus in the nose and relieve sneezing. Antibiotics  help treat or prevent a bacterial infection. Self-care:   Rinse your sinuses as directed. Use a sinus rinse device to rinse your nasal passages with a saline (salt water) solution or distilled water. Do not use tap water. This will help thin the mucus in your nose and rinse away pollen and dirt. It will also help reduce swelling so you can breathe normally.     Use a humidifier  to increase air moisture in your home. This may make it easier for you to breathe and help decrease your cough. Sleep with your head elevated. Place an extra pillow under your head before you go to sleep to help your sinuses drain. Drink liquids as directed. Ask your healthcare provider how much liquid to drink each day and which liquids are best for you. Liquids will thin the mucus in your nose and help it drain. Avoid drinks that contain alcohol or caffeine. Do not smoke, and avoid secondhand smoke. Nicotine and other chemicals in cigarettes and cigars can make your symptoms worse. Ask your healthcare provider for information if you currently smoke and need help to quit. E-cigarettes or smokeless tobacco still contain nicotine. Talk to your healthcare provider before you use these products. Prevent the spread of germs:   Wash your hands often with soap and water. Wash your hands after you use the bathroom, change a child's diaper, or sneeze. Wash your hands before you prepare or eat food. Stay away from people who are sick. Some germs spread easily and quickly through contact. Follow up with your doctor as directed: You may be referred to an ear, nose, and throat specialist. Write down your questions so you remember to ask them during your visits. © Copyright [employer]ha Mar 2023 Information is for End User's use only and may not be sold, redistributed or otherwise used for commercial purposes. The above information is an  only. It is not intended as medical advice for individual conditions or treatments. Talk to your doctor, nurse or pharmacist before following any medical regimen to see if it is safe and effective for you.
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
## 2023-10-30 ENCOUNTER — APPOINTMENT (OUTPATIENT)
Dept: LAB | Facility: CLINIC | Age: 68
End: 2023-10-30
Payer: MEDICARE

## 2023-10-30 DIAGNOSIS — Z51.81 ENCOUNTER FOR MONITORING DENOSUMAB THERAPY: ICD-10-CM

## 2023-10-30 DIAGNOSIS — Z79.899 ENCOUNTER FOR MONITORING DENOSUMAB THERAPY: ICD-10-CM

## 2023-10-30 LAB
ANION GAP SERPL CALCULATED.3IONS-SCNC: 7 MMOL/L
BUN SERPL-MCNC: 12 MG/DL (ref 5–25)
CALCIUM SERPL-MCNC: 9.4 MG/DL (ref 8.4–10.2)
CHLORIDE SERPL-SCNC: 107 MMOL/L (ref 96–108)
CO2 SERPL-SCNC: 28 MMOL/L (ref 21–32)
CREAT SERPL-MCNC: 0.92 MG/DL (ref 0.6–1.3)
GFR SERPL CREATININE-BSD FRML MDRD: 63 ML/MIN/1.73SQ M
GLUCOSE P FAST SERPL-MCNC: 92 MG/DL (ref 65–99)
POTASSIUM SERPL-SCNC: 3.8 MMOL/L (ref 3.5–5.3)
SODIUM SERPL-SCNC: 142 MMOL/L (ref 135–147)

## 2023-10-30 PROCEDURE — 36415 COLL VENOUS BLD VENIPUNCTURE: CPT

## 2023-10-30 PROCEDURE — 80048 BASIC METABOLIC PNL TOTAL CA: CPT
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
## 2023-11-29 ENCOUNTER — OFFICE VISIT (OUTPATIENT)
Dept: RHEUMATOLOGY | Facility: CLINIC | Age: 68
End: 2023-11-29
Payer: MEDICARE

## 2023-11-29 VITALS
BODY MASS INDEX: 23.37 KG/M2 | SYSTOLIC BLOOD PRESSURE: 130 MMHG | WEIGHT: 127 LBS | DIASTOLIC BLOOD PRESSURE: 90 MMHG | HEIGHT: 62 IN

## 2023-11-29 DIAGNOSIS — E55.9 VITAMIN D DEFICIENCY: ICD-10-CM

## 2023-11-29 DIAGNOSIS — M15.0 PRIMARY GENERALIZED (OSTEO)ARTHRITIS: ICD-10-CM

## 2023-11-29 DIAGNOSIS — Z51.81 ENCOUNTER FOR MONITORING DENOSUMAB THERAPY: ICD-10-CM

## 2023-11-29 DIAGNOSIS — M81.8 OTHER OSTEOPOROSIS WITHOUT CURRENT PATHOLOGICAL FRACTURE: Primary | ICD-10-CM

## 2023-11-29 DIAGNOSIS — Z79.899 ENCOUNTER FOR MONITORING DENOSUMAB THERAPY: ICD-10-CM

## 2023-11-29 PROCEDURE — 96372 THER/PROPH/DIAG INJ SC/IM: CPT

## 2023-11-29 PROCEDURE — 99213 OFFICE O/P EST LOW 20 MIN: CPT

## 2023-12-14 ENCOUNTER — ANNUAL EXAM (OUTPATIENT)
Dept: FAMILY MEDICINE CLINIC | Facility: MEDICAL CENTER | Age: 68
End: 2023-12-14
Payer: MEDICARE

## 2023-12-14 VITALS
DIASTOLIC BLOOD PRESSURE: 78 MMHG | HEART RATE: 78 BPM | HEIGHT: 62 IN | TEMPERATURE: 97.9 F | SYSTOLIC BLOOD PRESSURE: 138 MMHG | RESPIRATION RATE: 16 BRPM | OXYGEN SATURATION: 96 % | WEIGHT: 123.4 LBS | BODY MASS INDEX: 22.71 KG/M2

## 2023-12-14 DIAGNOSIS — Z12.4 SCREENING FOR CERVICAL CANCER: ICD-10-CM

## 2023-12-14 DIAGNOSIS — Z12.11 SCREEN FOR COLON CANCER: ICD-10-CM

## 2023-12-14 DIAGNOSIS — Z01.419 WELL WOMAN EXAM WITH ROUTINE GYNECOLOGICAL EXAM: Primary | ICD-10-CM

## 2023-12-14 PROCEDURE — G0101 CA SCREEN;PELVIC/BREAST EXAM: HCPCS | Performed by: STUDENT IN AN ORGANIZED HEALTH CARE EDUCATION/TRAINING PROGRAM
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
## 2023-12-27 ENCOUNTER — TELEPHONE (OUTPATIENT)
Age: 68
End: 2023-12-27

## 2023-12-27 NOTE — TELEPHONE ENCOUNTER
12/27/23  Screened by: Donkaia Lrpal    Referring Provider Usha Piedra    Pre- Screening:     There is no height or weight on file to calculate BMI.  Has patient been referred for a routine screening Colonoscopy? yes  Is the patient between 45-75 years old? yes    PT PASSED OA    Previous Colonoscopy yes   If yes:    Date: Nov. 2014    Facility:     Reason:       SCHEDULING STAFF: If the patient is between 45yrs-49yrs, please advise patient to confirm benefits/coverage with their insurance company for a routine screening colonoscopy, some insurance carriers will only cover at 50yrs or older. If the patient is over 75years old, please schedule an office visit.     Does the patient want to see a Gastroenterologist prior to their procedure OR are they having any GI symptoms? no    Has the patient been hospitalized or had abdominal surgery in the past 6 months? no    Does the patient use supplemental oxygen? no    Does the patient take Coumadin, Lovenox, Plavix, Elliquis, Xarelto, or other blood thinning medication? no    Has the patient had a stroke, cardiac event, or stent placed in the past year? no    SCHEDULING STAFF: If patient answers NO to above questions, then schedule procedure. If patient answers YES to above questions, then schedule office appointment.     If patient is between 45yrs - 49yrs, please advise patient that we will have to confirm benefits & coverage with their insurance company for a routine screening colonoscopy.

## 2024-01-15 ENCOUNTER — NURSE TRIAGE (OUTPATIENT)
Age: 69
End: 2024-01-15

## 2024-01-15 NOTE — TELEPHONE ENCOUNTER
Regarding: severe hip, pelvic, and abdominal pain  ----- Message from Hanna Green sent at 1/15/2024  8:55 AM EST -----  Patient called in stating she has had severe pain in her hip, pelvic, abdominal area and has gotten worse. Please advise.

## 2024-01-15 NOTE — TELEPHONE ENCOUNTER
"Reason for Disposition  • SEVERE pain (e.g., excruciating, scale 8-10) and present > 1 hour    Answer Assessment - Initial Assessment Questions  1. LOCATION: \"Where does it hurt?\" (e.g., left, right)         Flank pain left side severe hx of stone       2. ONSET: \"When did the pain start?\"          Started before [facility]  but slowly getting worse and now nothing helps       3. SEVERITY: \"How bad is the pain?\" (e.g., Scale 1-10; mild, moderate, or severe)    - MILD (1-3): doesn't interfere with normal activities     - MODERATE (4-7): interferes with normal activities or awakens from sleep     - SEVERE (8-10): excruciating pain and patient unable to do normal activities (stays in bed)          Rates pain an 8     4. PATTERN: \"Does the pain come and go, or is it constant?\"         Constant     5. CAUSE: \"What do you think is causing the pain?\"       Unsure possible stone    6. OTHER SYMPTOMS:  \"Do you have any other symptoms?\" (e.g., fever, abdominal pain, vomiting, leg weakness, burning with urination, blood in urine)        Tender to touch    Protocols used: Flank Pain-ADULT-[place]    "

## 2024-01-15 NOTE — TELEPHONE ENCOUNTER
Patient called in stating she has severe pain in flank area and has a history of large kidney stone on the left side where she is having the pain.  Rates the pain an 8  severe & constant.  Denies fever , chills, vomiting or difficulty urinating .  Complains of side being tender  to touch. Recommend ER evaluation.

## 2024-01-20 ENCOUNTER — HOSPITAL ENCOUNTER (EMERGENCY)
Facility: HOSPITAL | Age: 69
Discharge: HOME | End: 2024-01-20
Attending: EMERGENCY MEDICINE
Payer: COMMERCIAL

## 2024-01-20 ENCOUNTER — APPOINTMENT (EMERGENCY)
Dept: CT IMAGING | Facility: HOSPITAL | Age: 69
End: 2024-01-20
Payer: COMMERCIAL

## 2024-01-20 ENCOUNTER — APPOINTMENT (EMERGENCY)
Dept: RADIOLOGY | Facility: HOSPITAL | Age: 69
End: 2024-01-20
Payer: COMMERCIAL

## 2024-01-20 VITALS
SYSTOLIC BLOOD PRESSURE: 147 MMHG | DIASTOLIC BLOOD PRESSURE: 68 MMHG | BODY MASS INDEX: 22.08 KG/M2 | TEMPERATURE: 97.7 F | HEART RATE: 72 BPM | OXYGEN SATURATION: 98 % | WEIGHT: 120 LBS | RESPIRATION RATE: 18 BRPM | HEIGHT: 62 IN

## 2024-01-20 DIAGNOSIS — M25.552 LEFT HIP PAIN: Primary | ICD-10-CM

## 2024-01-20 LAB
ALBUMIN SERPL BCP-MCNC: 4.2 G/DL (ref 3.5–5)
ALP SERPL-CCNC: 33 U/L (ref 34–104)
ALT SERPL W P-5'-P-CCNC: 10 U/L (ref 7–52)
ANION GAP SERPL CALCULATED.3IONS-SCNC: 7 MMOL/L
AST SERPL W P-5'-P-CCNC: 12 U/L (ref 13–39)
BASOPHILS # BLD AUTO: 0.08 THOUSANDS/ÂΜL (ref 0–0.1)
BASOPHILS NFR BLD AUTO: 1 % (ref 0–1)
BILIRUB SERPL-MCNC: 0.47 MG/DL (ref 0.2–1)
BILIRUB UR QL STRIP: NEGATIVE
BUN SERPL-MCNC: 11 MG/DL (ref 5–25)
CALCIUM SERPL-MCNC: 10.9 MG/DL (ref 8.4–10.2)
CHLORIDE SERPL-SCNC: 104 MMOL/L (ref 96–108)
CLARITY UR: CLEAR
CO2 SERPL-SCNC: 28 MMOL/L (ref 21–32)
COLOR UR: YELLOW
CREAT SERPL-MCNC: 0.92 MG/DL (ref 0.6–1.3)
EOSINOPHIL # BLD AUTO: 0.34 THOUSAND/ÂΜL (ref 0–0.61)
EOSINOPHIL NFR BLD AUTO: 4 % (ref 0–6)
ERYTHROCYTE [DISTWIDTH] IN BLOOD BY AUTOMATED COUNT: 12.1 % (ref 11.6–15.1)
GFR SERPL CREATININE-BSD FRML MDRD: 63 ML/MIN/1.73SQ M
GLUCOSE SERPL-MCNC: 86 MG/DL (ref 65–140)
GLUCOSE UR STRIP-MCNC: NEGATIVE MG/DL
HCT VFR BLD AUTO: 39.2 % (ref 34.8–46.1)
HGB BLD-MCNC: 12.9 G/DL (ref 11.5–15.4)
HGB UR QL STRIP.AUTO: NEGATIVE
IMM GRANULOCYTES # BLD AUTO: 0.01 THOUSAND/UL (ref 0–0.2)
IMM GRANULOCYTES NFR BLD AUTO: 0 % (ref 0–2)
KETONES UR STRIP-MCNC: NEGATIVE MG/DL
LEUKOCYTE ESTERASE UR QL STRIP: NEGATIVE
LIPASE SERPL-CCNC: 21 U/L (ref 11–82)
LYMPHOCYTES # BLD AUTO: 3.07 THOUSANDS/ÂΜL (ref 0.6–4.47)
LYMPHOCYTES NFR BLD AUTO: 38 % (ref 14–44)
MCH RBC QN AUTO: 30.5 PG (ref 26.8–34.3)
MCHC RBC AUTO-ENTMCNC: 32.9 G/DL (ref 31.4–37.4)
MCV RBC AUTO: 93 FL (ref 82–98)
MONOCYTES # BLD AUTO: 0.72 THOUSAND/ÂΜL (ref 0.17–1.22)
MONOCYTES NFR BLD AUTO: 9 % (ref 4–12)
NEUTROPHILS # BLD AUTO: 3.77 THOUSANDS/ÂΜL (ref 1.85–7.62)
NEUTS SEG NFR BLD AUTO: 48 % (ref 43–75)
NITRITE UR QL STRIP: NEGATIVE
NRBC BLD AUTO-RTO: 0 /100 WBCS
PH UR STRIP.AUTO: 8 [PH]
PLATELET # BLD AUTO: 368 THOUSANDS/UL (ref 149–390)
PMV BLD AUTO: 9 FL (ref 8.9–12.7)
POTASSIUM SERPL-SCNC: 4.2 MMOL/L (ref 3.5–5.3)
PROT SERPL-MCNC: 6.6 G/DL (ref 6.4–8.4)
PROT UR STRIP-MCNC: NEGATIVE MG/DL
RBC # BLD AUTO: 4.23 MILLION/UL (ref 3.81–5.12)
SODIUM SERPL-SCNC: 139 MMOL/L (ref 135–147)
SP GR UR STRIP.AUTO: 1.02
UROBILINOGEN UR QL STRIP.AUTO: 0.2 E.U./DL
WBC # BLD AUTO: 7.99 THOUSAND/UL (ref 4.31–10.16)

## 2024-01-20 PROCEDURE — 73502 X-RAY EXAM HIP UNI 2-3 VIEWS: CPT

## 2024-01-20 PROCEDURE — 36415 COLL VENOUS BLD VENIPUNCTURE: CPT | Performed by: EMERGENCY MEDICINE

## 2024-01-20 PROCEDURE — G1004 CDSM NDSC: HCPCS

## 2024-01-20 PROCEDURE — 99285 EMERGENCY DEPT VISIT HI MDM: CPT | Performed by: EMERGENCY MEDICINE

## 2024-01-20 PROCEDURE — 99284 EMERGENCY DEPT VISIT MOD MDM: CPT

## 2024-01-20 PROCEDURE — 80053 COMPREHEN METABOLIC PANEL: CPT | Performed by: EMERGENCY MEDICINE

## 2024-01-20 PROCEDURE — 74177 CT ABD & PELVIS W/CONTRAST: CPT

## 2024-01-20 PROCEDURE — 81003 URINALYSIS AUTO W/O SCOPE: CPT | Performed by: EMERGENCY MEDICINE

## 2024-01-20 PROCEDURE — 83690 ASSAY OF LIPASE: CPT | Performed by: EMERGENCY MEDICINE

## 2024-01-20 PROCEDURE — 85025 COMPLETE CBC W/AUTO DIFF WBC: CPT | Performed by: EMERGENCY MEDICINE

## 2024-01-20 RX ORDER — METHOCARBAMOL 500 MG/1
500 TABLET, FILM COATED ORAL ONCE
Status: COMPLETED | OUTPATIENT
Start: 2024-01-20 | End: 2024-01-20

## 2024-01-20 RX ADMIN — IOHEXOL 100 ML: 350 INJECTION, SOLUTION INTRAVENOUS at 17:12

## 2024-01-20 RX ADMIN — METHOCARBAMOL 500 MG: 500 TABLET ORAL at 15:53

## 2024-01-20 NOTE — ED CARE HANDOFF
Emergency Department Sign Out Note        Sign out and transfer of care from MAREK Couch. See Separate Emergency Department note.     The patient, Zuleyka Douglas, was evaluated by the previous provider for L hip pain.    Workup Completed:  Labs    ED Course / Workup Pending (followup):  CT of A/P.                                  ED Course as of 01/20/24 1904   Sat Jan 20, 2024   1643 Signout from day physician Dr. Tomas Powell, patient is complaining of left-sided abdominal pain left lower quadrant, left flank pain and left hip pain.  X-rays unremarkable of the pelvis, labs thus far urine is negative for any infection, CBC normal CT of the abdomen pelvis pending.   1823 CT of a/p: Colonic diverticulosis without evidence for acute diverticulitis.     Status post left hip arthroplasty without evidence for hardware complication. No acute fractures identified.     Stable chronic loss of height of the T12 vertebral body.     1833 Garth Hernandez DO form the hip surgery in July 2020 at [facility], reviewed all necessary diagnostic data with the patient and  at the bedside, no clinical indication for further management this time, hardware from the left hip replacement appears to be intact, no evidence of diverticulitis, no kidney stones or evidence of hydronephrosis on CT imaging, urine is negative for infection, patient stable for discharge follow-up with orthopedics.     Procedures  Medical Decision Making  Amount and/or Complexity of Data Reviewed  Labs: ordered.  Radiology: ordered and independent interpretation performed.    Risk  Prescription drug management.            Disposition  Final diagnoses:   Left hip pain     Time reflects when diagnosis was documented in both MDM as applicable and the Disposition within this note       Time User Action Codes Description Comment    1/20/2024  6:36 PM Iain Valentino Add [M25.552] Left hip pain           ED Disposition       ED Disposition   Discharge     Condition   Stable    Date/Time   Sat Jan 20, 2024  6:36 PM    Comment   Zuleyka TORRES Felixemanuel discharge to home/self care.                   Follow-up Information       Follow up With Specialties Details Why Contact Info    Usha Piedra, DO Family Medicine   [address] [place] [address]  [address]  [facility] [id number] [phone]      Garth Hernandez DO Orthopedic Surgery   [address] [facility]  [address]  [facility] [address]  [phone]            Discharge Medication List as of 1/20/2024  6:36 PM        CONTINUE these medications which have NOT CHANGED    Details   Calcium 600 MG tablet Take 1 tablet by mouth daily, Starting Fri 7/22/2016, Historical Med      Cholecalciferol (VITAMIN D) 2000 units CAPS Take 1 tablet by mouth daily, Historical Med      denosumab (Prolia) 60 mg/mL Inject 60 mg under the skin once, Historical Med      omeprazole (PriLOSEC) 20 mg delayed release capsule TAKE 1 CAPSULE BY MOUTH EVERY DAY, Normal      traZODone (DESYREL) 50 mg tablet TAKE 1 TABLET BY MOUTH EVERYDAY AT BEDTIME, Starting Wed 9/6/2023, Normal           No discharge procedures on file.       ED Provider  Electronically Signed by     Iain Valentino III, DO  01/20/24 0944
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
## 2024-02-08 ENCOUNTER — OFFICE VISIT (OUTPATIENT)
Dept: OBGYN CLINIC | Facility: CLINIC | Age: 69
End: 2024-02-08
Payer: COMMERCIAL

## 2024-02-08 DIAGNOSIS — M21.70 LEG LENGTH DISCREPANCY: ICD-10-CM

## 2024-02-08 DIAGNOSIS — M76.892 HIP ABDUCTOR TENDINITIS, LEFT: ICD-10-CM

## 2024-02-08 DIAGNOSIS — M70.62 GREATER TROCHANTERIC BURSITIS OF LEFT HIP: ICD-10-CM

## 2024-02-08 DIAGNOSIS — Z96.642 STATUS POST LEFT HIP REPLACEMENT: Primary | ICD-10-CM

## 2024-02-08 PROCEDURE — 99214 OFFICE O/P EST MOD 30 MIN: CPT | Performed by: ORTHOPAEDIC SURGERY

## 2024-02-08 RX ORDER — CLINDAMYCIN HYDROCHLORIDE 300 MG/1
CAPSULE ORAL
COMMUNITY
Start: 2024-02-07
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
## 2024-02-19 ENCOUNTER — HOSPITAL ENCOUNTER (OUTPATIENT)
Dept: RADIOLOGY | Facility: IMAGING CENTER | Age: 69
Discharge: HOME | End: 2024-02-19
Payer: COMMERCIAL

## 2024-02-19 PROCEDURE — 76775 US EXAM ABDO BACK WALL LIM: CPT

## 2024-02-20 DIAGNOSIS — F51.01 PRIMARY INSOMNIA: ICD-10-CM

## 2024-02-20 DIAGNOSIS — K21.9 GASTROESOPHAGEAL REFLUX DISEASE: ICD-10-CM

## 2024-02-20 RX ORDER — TRAZODONE HYDROCHLORIDE 50 MG/1
50 TABLET ORAL
Qty: 30 TABLET | Refills: 1 | Status: SHIPPED | OUTPATIENT
Start: 2024-02-20

## 2024-02-20 RX ORDER — OMEPRAZOLE 20 MG/1
CAPSULE, DELAYED RELEASE ORAL
Qty: 90 CAPSULE | Refills: 1 | Status: SHIPPED | OUTPATIENT
Start: 2024-02-20 | End: 2024-02-20 | Stop reason: SDUPTHER

## 2024-02-20 RX ORDER — OMEPRAZOLE 20 MG/1
20 CAPSULE, DELAYED RELEASE ORAL DAILY
Qty: 30 CAPSULE | Refills: 5 | Status: SHIPPED | OUTPATIENT
Start: 2024-02-20

## 2024-03-04 ENCOUNTER — HOSPITAL ENCOUNTER (OUTPATIENT)
Dept: CT IMAGING | Facility: HOSPITAL | Age: 69
Discharge: HOME | End: 2024-03-04
Payer: COMMERCIAL

## 2024-03-04 ENCOUNTER — TELEPHONE (OUTPATIENT)
Dept: UROLOGY | Facility: CLINIC | Age: 69
End: 2024-03-04

## 2024-03-04 DIAGNOSIS — Z96.642 STATUS POST LEFT HIP REPLACEMENT: ICD-10-CM

## 2024-03-04 DIAGNOSIS — N20.0 NEPHROLITHIASIS: Primary | ICD-10-CM

## 2024-03-04 DIAGNOSIS — M21.70 LEG LENGTH DISCREPANCY: ICD-10-CM

## 2024-03-04 PROCEDURE — 77073 BONE LENGTH STUDIES: CPT

## 2024-03-04 NOTE — TELEPHONE ENCOUNTER
Spoke to pt and advised:    YAA Sandhu-C2 hours ago (11:59 AM)     Most recent ultrasound reviewed -- bilateral non-obstructing calculi visualized. No other abnormalities seen. No emergent surgical intervention needed at this time. Most recent UA also is negative for blood or infection.      Patient can be contacted with results, no need to be seen in office. I would repeat renal u/s in one year (ordered) as long as patient remains asymptomatic she can follow up in 1 year.         Pt's upcoming appt with Dr. Tavares for 3/13/24 has been cancelled.

## 2024-03-04 NOTE — TELEPHONE ENCOUNTER
Most recent ultrasound reviewed -- bilateral non-obstructing calculi visualized. No other abnormalities seen. No emergent surgical intervention needed at this time. Most recent UA also is negative for blood or infection.     Patient can be contacted with results, no need to be seen in office. I would repeat renal u/s in one year (ordered) as long as patient remains asymptomatic she can follow up in 1 year.

## 2024-03-07 DIAGNOSIS — M21.70 LEG LENGTH DISCREPANCY: ICD-10-CM

## 2024-03-07 DIAGNOSIS — Z96.642 STATUS POST LEFT HIP REPLACEMENT: Primary | ICD-10-CM

## 2024-03-11 ENCOUNTER — RA CDI HCC (OUTPATIENT)
Dept: OTHER | Facility: HOSPITAL | Age: 69
End: 2024-03-11

## 2024-03-13 ENCOUNTER — RA CDI HCC (OUTPATIENT)
Dept: OTHER | Facility: HOSPITAL | Age: 69
End: 2024-03-13

## 2024-03-14 ENCOUNTER — OFFICE VISIT (OUTPATIENT)
Dept: FAMILY MEDICINE CLINIC | Facility: MEDICAL CENTER | Age: 69
End: 2024-03-14
Payer: COMMERCIAL

## 2024-03-14 ENCOUNTER — TELEPHONE (OUTPATIENT)
Dept: OTHER | Facility: OTHER | Age: 69
End: 2024-03-14

## 2024-03-14 VITALS
BODY MASS INDEX: 22.53 KG/M2 | HEART RATE: 88 BPM | RESPIRATION RATE: 16 BRPM | DIASTOLIC BLOOD PRESSURE: 88 MMHG | SYSTOLIC BLOOD PRESSURE: 112 MMHG | WEIGHT: 123.2 LBS | OXYGEN SATURATION: 98 % | TEMPERATURE: 99.3 F

## 2024-03-14 DIAGNOSIS — R05.1 ACUTE COUGH: ICD-10-CM

## 2024-03-14 DIAGNOSIS — R68.89 FLU-LIKE SYMPTOMS: Primary | ICD-10-CM

## 2024-03-14 PROCEDURE — 99213 OFFICE O/P EST LOW 20 MIN: CPT | Performed by: STUDENT IN AN ORGANIZED HEALTH CARE EDUCATION/TRAINING PROGRAM

## 2024-03-14 PROCEDURE — 87636 SARSCOV2 & INF A&B AMP PRB: CPT | Performed by: STUDENT IN AN ORGANIZED HEALTH CARE EDUCATION/TRAINING PROGRAM

## 2024-03-14 PROCEDURE — G2211 COMPLEX E/M VISIT ADD ON: HCPCS | Performed by: STUDENT IN AN ORGANIZED HEALTH CARE EDUCATION/TRAINING PROGRAM

## 2024-03-14 RX ORDER — HYDROCODONE POLISTIREX AND CHLORPHENIRAMINE POLISTIREX 10; 8 MG/5ML; MG/5ML
5 SUSPENSION, EXTENDED RELEASE ORAL EVERY 12 HOURS PRN
Qty: 70 ML | Refills: 0 | Status: SHIPPED | OUTPATIENT
Start: 2024-03-14 | End: 2024-03-15 | Stop reason: SDUPTHER

## 2024-03-14 NOTE — PROGRESS NOTES
"COVID-19 Outpatient Progress Note    Assessment/Plan:    Problem List Items Addressed This Visit          Other    Flu-like symptoms - Primary     -Fluids, rest, OTC antipyretic as needed, Flonase  -Patient complaining of bothersome cough and requests Tussionex as this has helped her in the past, Rx sent short-term  -Preemptively did discuss Tamiflu and side effects as patient exhibiting flulike symptoms and had exposure to her grandson influenza A         Relevant Medications    Hydrocod Nicolas-Chlorphe Nicolas ER (TUSSIONEX) 10-8 mg/5 mL ER suspension    Other Relevant Orders    Covid/Flu- Office Collect Normal     Other Visit Diagnoses       Acute cough        Relevant Medications    Hydrocod Nicolas-Chlorphe Nicolas ER (TUSSIONEX) 10-8 mg/5 mL ER suspension           Disposition:     I have spent a total time of 15 minutes on the day of the encounter for this patient including       Encounter provider: Usha Piedra DO     Provider located at: [address] [address]     Recent Visits  No visits were found meeting these conditions.  Showing recent visits within past 7 days and meeting all other requirements  Today's Visits  Date Type Provider Dept   03/14/24 Office Visit Usha Piedra DO [facility]   Showing today's visits and meeting all other requirements  Future Appointments  No visits were found meeting these conditions.  Showing future appointments within next 150 days and meeting all other requirements     Subjective:   Zuleyka Douglas is a 68 y.o. female who is concerned about COVID-19. Patient's symptoms include fever (\"103 F\"), chills, fatigue, malaise, nasal congestion, rhinorrhea, cough, myalgias and headache. Patient denies sore throat, anosmia, loss of taste, shortness of breath, chest tightness, abdominal pain, nausea, vomiting and diarrhea.     - Date of symptom onset: 3/13/2024      COVID-19 vaccination status: Fully " "vaccinated with booster    Exposure:   Contact with a person who is under investigation (PUI) for or who is positive for COVID-19 within the last 14 days?: No    Hospitalized recently for fever and/or lower respiratory symptoms?: No      Currently a healthcare worker that is involved in direct patient care?: No      Works in a special setting where the risk of COVID-19 transmission may be high? (this may include long-term care, correctional and shelter facilities; homeless shelters; assisted-living facilities and group homes.): No      Resident in a special setting where the risk of COVID-19 transmission may be high? (this may include long-term care, correctional and shelter facilities; homeless shelters; assisted-living facilities and group homes.): No      Patient states she had exposure to influenza A through her grandson.  Patient has been taking Motrin  Patient requests Tussionex \" only thing that helps the cough\"    Lab Results   Component Value Date    SARSCOV2 Negative 12/13/2021    SARSCOV2 Not Detected 07/02/2020    SARSCOVAG Negative 04/27/2023       Review of Systems   Constitutional:  Positive for chills, fatigue and fever (\"103 F\").   HENT:  Positive for congestion and rhinorrhea. Negative for sore throat.    Respiratory:  Positive for cough. Negative for chest tightness and shortness of breath.    Gastrointestinal:  Negative for abdominal pain, diarrhea, nausea and vomiting.   Musculoskeletal:  Positive for myalgias.   Neurological:  Positive for headaches.     Current Outpatient Medications on File Prior to Visit   Medication Sig    Calcium 600 MG tablet Take 1 tablet by mouth daily    Cholecalciferol (VITAMIN D) 2000 units CAPS Take 1 tablet by mouth daily    denosumab (Prolia) 60 mg/mL Inject 60 mg under the skin once    omeprazole (PriLOSEC) 20 mg delayed release capsule Take 1 capsule (20 mg total) by mouth daily    traZODone (DESYREL) 50 mg tablet Take 1 tablet (50 mg total) by mouth daily " at bedtime    clindamycin (CLEOCIN) 300 MG capsule        Objective:    /88 (BP Location: Left arm, Patient Position: Sitting, Cuff Size: Standard)   Pulse 88   Temp 99.3 °F (37.4 °C) (Temporal)   Resp 16   Wt 55.9 kg (123 lb 3.2 oz)   SpO2 98%   BMI 22.53 kg/m²        Physical Exam  Vitals reviewed.   Constitutional:       General: She is not in acute distress.     Appearance: She is ill-appearing. She is not toxic-appearing.   HENT:      Head: Normocephalic and atraumatic.      Right Ear: Tympanic membrane, ear canal and external ear normal.      Left Ear: Tympanic membrane, ear canal and external ear normal.      Nose: Congestion present.      Mouth/Throat:      Mouth: Mucous membranes are moist.      Pharynx: Oropharynx is clear. No posterior oropharyngeal erythema. Oropharyngeal exudate: post-nasal drip.  Eyes:      General:         Right eye: No discharge.         Left eye: No discharge.      Extraocular Movements: Extraocular movements intact.      Conjunctiva/sclera: Conjunctivae normal.      Pupils: Pupils are equal, round, and reactive to light.   Cardiovascular:      Rate and Rhythm: Normal rate and regular rhythm.      Pulses: Normal pulses.      Heart sounds: Normal heart sounds.   Pulmonary:      Effort: Pulmonary effort is normal. No respiratory distress.      Breath sounds: No wheezing or rales.   Abdominal:      General: Abdomen is flat. Bowel sounds are normal.      Palpations: Abdomen is soft.      Tenderness: There is no abdominal tenderness.   Musculoskeletal:      Cervical back: Neck supple. No rigidity.      Right lower leg: No edema.      Left lower leg: No edema.   Lymphadenopathy:      Cervical: No cervical adenopathy.   Skin:     General: Skin is warm and dry.      Capillary Refill: Capillary refill takes less than 2 seconds.      Findings: No rash.   Neurological:      Mental Status: She is alert and oriented to person, place, and time.   Psychiatric:         Mood and Affect:  Mood normal.         Behavior: Behavior normal.         Thought Content: Thought content normal.         Judgment: Judgment normal.       Usha Piedra, DO

## 2024-03-14 NOTE — TELEPHONE ENCOUNTER
Pt requested for the prescription for Hydrocod Nicolas-Chlorphe Nicolas ER (TUSSIONEX) 10-8 mg/5 mL ER suspension to be send to [facility] [address] [address] [phone]. [facility] is out of stock.

## 2024-03-14 NOTE — ASSESSMENT & PLAN NOTE
-Fluids, rest, OTC antipyretic as needed, Flonase  -Patient complaining of bothersome cough and requests Tussionex as this has helped her in the past, Rx sent short-term  -Preemptively did discuss Tamiflu and side effects as patient exhibiting flulike symptoms and had exposure to her grandson influenza A

## 2024-03-15 ENCOUNTER — TELEPHONE (OUTPATIENT)
Age: 69
End: 2024-03-15

## 2024-03-15 DIAGNOSIS — R05.1 ACUTE COUGH: ICD-10-CM

## 2024-03-15 DIAGNOSIS — R68.89 FLU-LIKE SYMPTOMS: ICD-10-CM

## 2024-03-15 LAB
FLUAV RNA RESP QL NAA+PROBE: NEGATIVE
FLUBV RNA RESP QL NAA+PROBE: NEGATIVE
SARS-COV-2 RNA RESP QL NAA+PROBE: NEGATIVE

## 2024-03-15 RX ORDER — HYDROCODONE POLISTIREX AND CHLORPHENIRAMINE POLISTIREX 10; 8 MG/5ML; MG/5ML
5 SUSPENSION, EXTENDED RELEASE ORAL EVERY 12 HOURS PRN
Qty: 70 ML | Refills: 0 | Status: SHIPPED | OUTPATIENT
Start: 2024-03-15 | End: 2024-03-19

## 2024-03-15 NOTE — TELEPHONE ENCOUNTER
Fiorella spoke to patient yesterday, she has not yet established with new PCP.  She will be establishing with her soon 3/19/2024 as it is closer to home.

## 2024-03-15 NOTE — TELEPHONE ENCOUNTER
Patient calling in stating that the medication prescribed for Hydrocod Nicolas-Chlorphe Nicolas ER (TUSSIONEX) 10-8 mg/5 mL ER suspension is not available at the pharmacy sent to.    Please provide an alternative or find a pharmacy who may have it in stock

## 2024-03-18 NOTE — TELEPHONE ENCOUNTER
Per patient request Rx was sent again to [facility], was she able to  there?  If not, she may inquire at local pharmacies and let us know which is done.  Or can send an alternative Rx for cough.

## 2024-03-18 NOTE — TELEPHONE ENCOUNTER
Pt called back to say that she spoke to [facility] in [place] and they will be able to get the medication for the pt.Thank you.

## 2024-03-19 ENCOUNTER — OFFICE VISIT (OUTPATIENT)
Dept: FAMILY MEDICINE CLINIC | Facility: CLINIC | Age: 69
End: 2024-03-19
Payer: COMMERCIAL

## 2024-03-19 VITALS
OXYGEN SATURATION: 99 % | BODY MASS INDEX: 22.49 KG/M2 | WEIGHT: 122.2 LBS | TEMPERATURE: 98.8 F | HEART RATE: 100 BPM | DIASTOLIC BLOOD PRESSURE: 80 MMHG | HEIGHT: 62 IN | SYSTOLIC BLOOD PRESSURE: 122 MMHG

## 2024-03-19 DIAGNOSIS — Z76.89 ENCOUNTER TO ESTABLISH CARE WITH NEW DOCTOR: ICD-10-CM

## 2024-03-19 DIAGNOSIS — E78.5 HYPERLIPIDEMIA, UNSPECIFIED HYPERLIPIDEMIA TYPE: ICD-10-CM

## 2024-03-19 DIAGNOSIS — M46.1 SACROILIITIS (HCC): ICD-10-CM

## 2024-03-19 DIAGNOSIS — K21.9 GASTROESOPHAGEAL REFLUX DISEASE WITHOUT ESOPHAGITIS: Primary | ICD-10-CM

## 2024-03-19 DIAGNOSIS — Z13.1 ENCOUNTER FOR SCREENING EXAMINATION FOR IMPAIRED GLUCOSE REGULATION AND DIABETES MELLITUS: ICD-10-CM

## 2024-03-19 DIAGNOSIS — G47.00 INSOMNIA, UNSPECIFIED TYPE: ICD-10-CM

## 2024-03-19 DIAGNOSIS — Z13.0 SCREENING FOR DEFICIENCY ANEMIA: ICD-10-CM

## 2024-03-19 DIAGNOSIS — Z86.2 HISTORY OF THROMBOCYTOSIS: ICD-10-CM

## 2024-03-19 DIAGNOSIS — Z79.899 LONG-TERM USE OF HIGH-RISK MEDICATION: ICD-10-CM

## 2024-03-19 DIAGNOSIS — Z13.29 THYROID DISORDER SCREEN: ICD-10-CM

## 2024-03-19 PROBLEM — D47.3 ESSENTIAL HEMORRHAGIC THROMBOCYTHEMIA (HCC): Status: RESOLVED | Noted: 2019-07-29 | Resolved: 2024-03-19

## 2024-03-19 PROBLEM — R68.89 FLU-LIKE SYMPTOMS: Status: RESOLVED | Noted: 2024-03-14 | Resolved: 2024-03-19

## 2024-03-19 PROCEDURE — 99203 OFFICE O/P NEW LOW 30 MIN: CPT | Performed by: FAMILY MEDICINE

## 2024-03-19 NOTE — PROGRESS NOTES
"Name: Zuleyka Douglas      : 1955      MRN: [id number]  Encounter Provider: Ирина Lawson DO  Encounter Date: 3/19/2024   Encounter department: [facility]    Assessment & Plan     1. Gastroesophageal reflux disease without esophagitis    2. Encounter to establish care with new doctor    3. Insomnia, unspecified type    4. Sacroiliitis (HCC)    5. Hyperlipidemia, unspecified hyperlipidemia type  -     Lipid panel; Future    6. Encounter for screening examination for impaired glucose regulation and diabetes mellitus  -     Comprehensive metabolic panel; Future    7. Screening for deficiency anemia  -     CBC and differential; Future    8. History of thrombocytosis  -     CBC and differential; Future    9. Thyroid disorder screen  -     TSH, 3rd generation with Free T4 reflex; Future    10. Long-term use of high-risk medication  -     TSH, 3rd generation with Free T4 reflex; Future           Subjective      Chief Complaint   Patient presents with    Establish Care     New pt       New pt  Known osteoporosis managed by rheumatologist, on prolia - states her last DEXA last August showed improvement to osteopenia instead of osteoporosis  Reconciling meds - on omeprazole managed by PCP - I ask pt how long been on- states many years, \"can't even remember how long it's been\" - \"Every time try to go off of it I get the symptoms back\" - Admits she has in the past tried pepcid instead and no benefit  No GI eval for GERD on review of chart or any EGD  - pt states she did have years ago and was \"clear\"  Pt states, \"Colonoscopy is due so have an appt with her in April\"            2016  . [facility]  Yara Mendez DO  Rheumatology Referred by Donita Maza MD  Reason for Visit  Discussion/Summary  This is a 61-year-old female presenting today for follow-up for osteoporosis. Patient states that she's been doing pretty good since last appointment. She did undergo right knee " meniscal repair with a physician out of [facility]. She states that the surgery went well however she is currently having right knee discomfort and is receiving cortisone injections for this. She states that the injections do provide relief however will need a knee replacement eventually. The patient states that she has no other joint pain at this time however does notice swelling in both ankles mostly related to the warmer weather. She has no morning stiffness and denies any difficulty with sleep. She occasionally has nonrestorative sleep. She's been injecting Forteo daily for the last year and continues to utilize vitamin D. The patient states that she has stopped calcium however and is uncertain why. In addition she is active and also completed physical therapy for her knee as well. On physical exam, patient does not have any synovitis. She does have tenderness with palpation of the lumbar spine as well as bilateral greater trochanteric bursae. She also experiences some discomfort with palpation of the right CMC. Patient does have some restriction of the right knee with range of motion secondary to pain as well as crepitus in bilateral knees. Patient's labs including a CBC, CMP, TSH, and parathyroid hormone were all within normal range. At this time patient's history and physical is most consistent with osteoporosis which appears to be stable at this time. Patient will continue with Forteo injections daily. Patient will also continue with vitamin D and was encouraged to restart her calcium supplement. In addition she will remain active and continue with exercise on a routine basis. Patient will continue to follow-up with her orthopedic for further cortisone injections into the right knee. We will see patient back in 6 months time and we'll repeat a CBC, CMP, TSH, PTH, and vitamin D level. Patient will call in the interim if she has any further questions or concerns.   Progress Notes  Cora Holder PA-C  (Physician Assistant) · Rheumatology    Assessment    1. Osteoporosis (733.00) (M81.0)   2. Compression fracture of spine (805.8) (M48.50XA)   3. Primary generalized (osteo)arthritis (715.09) (M15.0)   4. Encounter for monitoring teriparatide therapy (V58.83,V58.69) (Z51.81,Z79.899)   5. Esophageal reflux (530.81) (K21.9)   6. Hyperlipidemia (272.4) (E78.5)        8/4/2016  [facility]  Donita Maza MD  Family Medicine  Active Problems    1. Abnormal blood chemistry (790.6) (R79.9)   2. Acute exacerbation of chronic low back pain (724.2,338.19,338.29) (M54.5,G89.29)   3. Acute lumbar back pain (724.2) (M54.5)   4. Acute rhinitis (460) (J00)   5. Adjustment disorder with anxiety (309.24) (F43.22)   6. Backache (724.5) (M54.9)   7. Bilateral ovarian cysts (620.2) (N83.20)   8. Bronchitis (490) (J40)   9. Calf pain (729.5) (M79.669)   10. Compression fracture of spine (805.8) (M48.50XA)   11. Degenerative lumbar disc (722.52) (M51.36)   12. Dysfunction of eustachian tube, unspecified laterality (381.81) (H69.80)   13. Encounter for monitoring teriparatide therapy (V58.83,V58.69) (Z51.81,Z79.899)   14. Esophageal reflux (530.81) (K21.9)   15. Hyperlipidemia (272.4) (E78.5)   16. Insomnia (780.52) (G47.00)   17. Jaw pain (784.92) (R68.84)   18. Joint pain (719.40) (M25.50)   19. Knee injury, right, initial encounter (959.7) (S89.91XA)   20. Lumbar arthropathy (721.3) (M46.96)   21. Mammogram abnormal (793.80) (R92.8)   22. Osteoporosis (733.00) (M81.0)   23. Other acute sinusitis (461.8) (J01.80)   24. Primary generalized (osteo)arthritis (715.09) (M15.0)   25. Sacroiliitis (720.2) (M46.1)   26. Thrombocytosis (238.71) (D47.3)   27. Vaginitis, atrophic (627.3) (N95.2)   28. Visit for routine gyn exam (V72.31) (Z01.419)   29. Weight loss, non-intentional (783.21) (R63.4)  Past Medical History    · History of Age At First Period 12  Years Old (Menarche)   · History of Age At First Pregnancy 27  Years Old    · History of Arthritis (V13.4)   · History of ASCUS favor benign (796.9)   · History of Colonoscopy (Fiberoptic)   · History of Encounter for screening colonoscopy (V76.51) (Z12.11)   · History of Encounter for screening mammogram for malignant neoplasm of breast  (V76.12) (Z12.31)   · History Of 2  Previous Pregnancies (V61.5)   · History of gastroesophageal reflux (GERD) (V12.79) (Z87.19)   · History of Menopause (627.2) (Z78.0)   · History of Ovarian cyst (620.2) (N83.20)   · History of Postmenopausal bleeding (627.1) (N95.0)   · History of Previous Pregnancies Resulted In 2  Live Birth(S)   · History of Rheumatic fever (390) (I00)  Surgical History    · History of Arthrodesis Thumb Carpometacarpal Joint   · History of Diagnostic Esophagogastroduodenoscopy   · History of Dilation And Curettage Of Cervical Stump   · History of Ear Surgery   · History of Endometrial Biopsy By Suction   · History of Knee Arthroscopy With Medial Meniscectomy   · History of Knee Surgery   · History of Tubal Ligation  Family History  Mother    · Family history of Arthritis (V17.7)   · Family history of Diabetes Mellitus (V18.0)   · Family history of Family Health Status Of Mother - Alive   · Family history of osteoporosis (V17.81) (Z82.62)  Father    · Family history of Diabetes Mellitus (V18.0)   · Family history of Family Health Status Of Father - Alive   · Family history of Heart Disease (V17.49)   · Family history of Prostate Cancer (V16.42)  Social History    · Denied: History of Being A Social Drinker   · Caffeine Use   · Never a smoker   · Occasional alcohol use   · 2 drinks/month   · Denied: History of Tobacco Use   · Two children      Review of Systems    Current Outpatient Medications on File Prior to Visit   Medication Sig    Calcium 600 MG tablet Take 1 tablet by mouth daily    Cholecalciferol (VITAMIN D) 2000 units CAPS Take 1 tablet by mouth daily    clindamycin (CLEOCIN) 300 MG capsule     denosumab (Prolia) 60 mg/mL  "Inject 60 mg under the skin once    omeprazole (PriLOSEC) 20 mg delayed release capsule Take 1 capsule (20 mg total) by mouth daily    traZODone (DESYREL) 50 mg tablet Take 1 tablet (50 mg total) by mouth daily at bedtime       Objective     /80 (BP Location: Left arm, Patient Position: Sitting, Cuff Size: Standard)   Pulse 100   Temp 98.8 °F (37.1 °C) (Tympanic)   Ht 5' 2\" (1.575 m)   Wt 55.4 kg (122 lb 3.2 oz)   SpO2 99%   BMI 22.35 kg/m²     Physical Exam  Vitals and nursing note reviewed.   Constitutional:       General: She is not in acute distress.     Appearance: She is well-developed. She is not ill-appearing, toxic-appearing or diaphoretic.   HENT:      Head: Normocephalic and atraumatic.      Mouth/Throat:      Lips: Pink.      Mouth: Mucous membranes are moist.      Pharynx: Oropharynx is clear. Uvula midline.   Neck:      Trachea: Phonation normal.   Cardiovascular:      Rate and Rhythm: Normal rate and regular rhythm.      Pulses: Normal pulses.      Heart sounds: Normal heart sounds.   Pulmonary:      Effort: Pulmonary effort is normal.      Breath sounds: Normal breath sounds and air entry.   Abdominal:      General: Bowel sounds are normal.      Palpations: Abdomen is soft. There is no hepatomegaly, splenomegaly or mass.      Tenderness: There is no abdominal tenderness.      Hernia: There is no hernia in the ventral area.   Musculoskeletal:      Right lower leg: No edema.      Left lower leg: No edema.   Skin:     General: Skin is warm and dry.      Coloration: Skin is not pale.   Neurological:      Mental Status: She is alert and oriented to person, place, and time.      Gait: Gait normal.   Psychiatric:         Attention and Perception: Attention normal.         Behavior: Behavior normal. Behavior is cooperative.       Ирина Lawson DO    "

## 2024-03-24 ENCOUNTER — HOSPITAL ENCOUNTER (EMERGENCY)
Facility: HOSPITAL | Age: 69
Discharge: HOME | End: 2024-03-24
Attending: FAMILY MEDICINE
Payer: COMMERCIAL

## 2024-03-24 ENCOUNTER — APPOINTMENT (EMERGENCY)
Dept: RADIOLOGY | Facility: HOSPITAL | Age: 69
End: 2024-03-24
Payer: COMMERCIAL

## 2024-03-24 VITALS
WEIGHT: 122 LBS | SYSTOLIC BLOOD PRESSURE: 130 MMHG | DIASTOLIC BLOOD PRESSURE: 69 MMHG | HEART RATE: 98 BPM | BODY MASS INDEX: 22.45 KG/M2 | HEIGHT: 62 IN | OXYGEN SATURATION: 99 % | TEMPERATURE: 98.7 F | RESPIRATION RATE: 20 BRPM

## 2024-03-24 DIAGNOSIS — S89.92XA INJURY OF LEFT KNEE, INITIAL ENCOUNTER: Primary | ICD-10-CM

## 2024-03-24 PROCEDURE — 99284 EMERGENCY DEPT VISIT MOD MDM: CPT | Performed by: PHYSICIAN ASSISTANT

## 2024-03-24 PROCEDURE — 99283 EMERGENCY DEPT VISIT LOW MDM: CPT

## 2024-03-24 PROCEDURE — 73564 X-RAY EXAM KNEE 4 OR MORE: CPT

## 2024-03-24 PROCEDURE — 96372 THER/PROPH/DIAG INJ SC/IM: CPT

## 2024-03-24 RX ORDER — KETOROLAC TROMETHAMINE 10 MG/1
10 TABLET, FILM COATED ORAL EVERY 6 HOURS PRN
Qty: 20 TABLET | Refills: 0 | Status: SHIPPED | OUTPATIENT
Start: 2024-03-24 | End: 2024-03-29 | Stop reason: ALTCHOICE

## 2024-03-24 RX ORDER — KETOROLAC TROMETHAMINE 30 MG/ML
15 INJECTION, SOLUTION INTRAMUSCULAR; INTRAVENOUS ONCE
Status: DISCONTINUED | OUTPATIENT
Start: 2024-03-24 | End: 2024-03-24

## 2024-03-24 RX ORDER — KETOROLAC TROMETHAMINE 30 MG/ML
15 INJECTION, SOLUTION INTRAMUSCULAR; INTRAVENOUS ONCE
Status: COMPLETED | OUTPATIENT
Start: 2024-03-24 | End: 2024-03-24

## 2024-03-24 RX ADMIN — KETOROLAC TROMETHAMINE 15 MG: 30 INJECTION, SOLUTION INTRAMUSCULAR; INTRAVENOUS at 11:36

## 2024-03-25 ENCOUNTER — APPOINTMENT (OUTPATIENT)
Dept: RADIOLOGY | Facility: CLINIC | Age: 69
End: 2024-03-25
Payer: COMMERCIAL

## 2024-03-25 ENCOUNTER — HOSPITAL ENCOUNTER (OUTPATIENT)
Dept: MRI IMAGING | Facility: HOSPITAL | Age: 69
Discharge: HOME | End: 2024-03-25
Payer: COMMERCIAL

## 2024-03-25 ENCOUNTER — OFFICE VISIT (OUTPATIENT)
Dept: OBGYN CLINIC | Facility: CLINIC | Age: 69
End: 2024-03-25
Payer: COMMERCIAL

## 2024-03-25 VITALS
DIASTOLIC BLOOD PRESSURE: 91 MMHG | SYSTOLIC BLOOD PRESSURE: 144 MMHG | BODY MASS INDEX: 22.31 KG/M2 | HEIGHT: 62 IN | HEART RATE: 91 BPM

## 2024-03-25 DIAGNOSIS — G89.29 CHRONIC PAIN OF LEFT KNEE: ICD-10-CM

## 2024-03-25 DIAGNOSIS — G89.29 CHRONIC PAIN OF LEFT KNEE: Primary | ICD-10-CM

## 2024-03-25 DIAGNOSIS — Z01.89 ENCOUNTER FOR LOWER EXTREMITY COMPARISON IMAGING STUDY: ICD-10-CM

## 2024-03-25 DIAGNOSIS — S89.92XA KNEE INJURY, LEFT, INITIAL ENCOUNTER: ICD-10-CM

## 2024-03-25 DIAGNOSIS — M25.562 CHRONIC PAIN OF LEFT KNEE: ICD-10-CM

## 2024-03-25 DIAGNOSIS — M25.562 CHRONIC PAIN OF LEFT KNEE: Primary | ICD-10-CM

## 2024-03-25 PROCEDURE — 73560 X-RAY EXAM OF KNEE 1 OR 2: CPT

## 2024-03-25 PROCEDURE — 73721 MRI JNT OF LWR EXTRE W/O DYE: CPT

## 2024-03-25 PROCEDURE — 99214 OFFICE O/P EST MOD 30 MIN: CPT | Performed by: STUDENT IN AN ORGANIZED HEALTH CARE EDUCATION/TRAINING PROGRAM

## 2024-03-25 PROCEDURE — 73562 X-RAY EXAM OF KNEE 3: CPT
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
## 2024-03-29 ENCOUNTER — OFFICE VISIT (OUTPATIENT)
Dept: OBGYN CLINIC | Facility: CLINIC | Age: 69
End: 2024-03-29
Payer: COMMERCIAL

## 2024-03-29 VITALS
HEART RATE: 80 BPM | HEIGHT: 62 IN | BODY MASS INDEX: 22.31 KG/M2 | DIASTOLIC BLOOD PRESSURE: 80 MMHG | SYSTOLIC BLOOD PRESSURE: 120 MMHG

## 2024-03-29 DIAGNOSIS — M17.11 PRIMARY OSTEOARTHRITIS OF RIGHT KNEE: Primary | ICD-10-CM

## 2024-03-29 DIAGNOSIS — S83.203D OTHER TEAR OF MENISCUS OF RIGHT KNEE, UNSPECIFIED MENISCUS, UNSPECIFIED WHETHER OLD OR CURRENT TEAR, SUBSEQUENT ENCOUNTER: ICD-10-CM

## 2024-03-29 DIAGNOSIS — M25.561 ACUTE PAIN OF RIGHT KNEE: ICD-10-CM

## 2024-03-29 DIAGNOSIS — G89.29 CHRONIC PAIN OF LEFT KNEE: ICD-10-CM

## 2024-03-29 DIAGNOSIS — M25.562 CHRONIC PAIN OF LEFT KNEE: ICD-10-CM

## 2024-03-29 DIAGNOSIS — S83.241A OTHER TEAR OF MEDIAL MENISCUS, CURRENT INJURY, RIGHT KNEE, INITIAL ENCOUNTER: ICD-10-CM

## 2024-03-29 PROCEDURE — 99214 OFFICE O/P EST MOD 30 MIN: CPT | Performed by: STUDENT IN AN ORGANIZED HEALTH CARE EDUCATION/TRAINING PROGRAM

## 2024-03-29 RX ORDER — MELOXICAM 15 MG/1
15 TABLET ORAL DAILY
Qty: 30 TABLET | Refills: 0 | Status: SHIPPED | OUTPATIENT
Start: 2024-03-29
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
## 2024-04-08 NOTE — PROGRESS NOTES
PT Evaluation     Today's date: 2024  Patient name: Zuleyka Douglas  : 1955  MRN: [id number]  Referring provider: Edvin Silva MD  Dx:   Encounter Diagnosis     ICD-10-CM    1. Acute pain of right knee  M25.561 Ambulatory Referral to Physical Therapy     PT plan of care cert/re-cert      2. Primary osteoarthritis of left knee  M17.12 PT plan of care cert/re-cert      3. Other tear of meniscus of left knee, unspecified meniscus, unspecified whether old or current tear, subsequent encounter  S83.204D PT plan of care cert/re-cert      4. Acute pain of left knee  M25.562 PT plan of care cert/re-cert          Start Time: 1100  Stop Time: 1200  Total time in clinic (min): 60 minutes    Assessment  Assessment details: Zuleyka Douglas is a 68 y.o. female presenting to outpatient physical therapy with chief complaint of L knee pain that started a few weeks ago after fall outside. Upon examination,  impairments include L knee pain mainly located to lateral aspect of L knee, impaired hamstring tissue extensibility,  reduced L knee/hip/glute strength, and reduced activity tolerance. Pt demonstrating difficulty performing functional squat due to weakness and pain; noted L valgus. Significant weakness of L hip and L knee noted with MMT. Overall, L knee AROM/PROM WNL; noted pain with L knee flexion especially at end range. Relief in pain with L knee extension. Positive for provacative testing of lateral/medial mensicus of L knee. Signs and symptoms at present appear consistent with referring diagnosis of osteoarthritis of L knee and meniscus tear of L knee. Due to noted impairments, the patient's present functional limitations include difficulty with ADLs/IADLs, difficulty walking long periods, difficulty taking dog for walks, difficulty squatting/bending which makes HH responsibilities difficult, and difficulty complete hobbies and flexing L knee.  Patient to benefit from skilled outpatient physical therapy 1-2x/week  "for 6-8 weeks in order to reduce L knee pain, maximize pain free L knee range of motion, increase knee/hip/glute strength and stability, and improve functional mobility/functional activity in order to maximize return to prior level of function with reduced limitations. Home exercise program was provided and all questions answered to patient's level of satisfaction. Thank you for your referral.       Impairments: abnormal gait, activity intolerance, impaired balance, impaired physical strength, lacks appropriate home exercise program and pain with function  Understanding of Dx/Px/POC: good   Prognosis: good    Goals  STGs to be achieved in 4 weeks:  1. Pt to demonstrate reduced subjective pain rating \"at worst\" by at least 2-3 points from Initial Eval in order to allow for reduced pain with ADLs and improved functional activity tolerance.   2. Initiate HEP with pt understanding and follow-though  3. Pt will report decrease in L knee symptoms by at least 25% to facilitate improved function.   4. Pt to demonstrate increased MMT of L knee flexion and extension by at least 1/2-1 grade in order to improve safety and stability with ADLs and functional mobility.   5. Pt to demonstrate increased MMT of L hip flexion,extension, and abduction by at least 1/2-1 grade in order to improve safety and stability with ADLs and functional mobility.       LTGs to be achieved by discharge:  1. Pt will be I with HEP in order to continue to improve quality of life and independence and reduce risk for re-injury.   2. Pt to demonstrate return to pre-injury ADL/IADL performance without limitations or restrictions.   3. Pt to demonstrate normalized gait pattern with no evidence of antalgic gait to demonstrate return to PLOF  4. Pt will demonstrate 4+-5/5 MMT of L knee strength to demonstrate improved overall LE function.    5. Pt to demonstrate ability to walk at least 30 minutes without limitations due to L knee pain to show improved " function.     Plan  Patient would benefit from: skilled physical therapy  Planned modality interventions: cryotherapy and thermotherapy: hydrocollator packs  Planned therapy interventions: IASTM, joint mobilization, manual therapy, massage, neuromuscular re-education, patient education, strengthening, stretching, therapeutic activities, therapeutic exercise, home exercise program, flexibility, coordination, balance/weight bearing training and body mechanics training  Frequency: 2x week  Duration in weeks: 8  Plan of Care beginning date: 4/9/2024  Plan of Care expiration date: 6/17/2024  Treatment plan discussed with: patient        Subjective Evaluation    History of Present Illness  Mechanism of injury: Pt is presenting to OPPT with chief complaint of L knee pain. Reports pain started about 2 weeks ago, reports she took her dog outside and her L knee got twisted and she fell on back porch. Reports that she could not walk at all after she fell. Reports that she went to the ER; reports she was walking with crutches afterwards. Reports that she was then was seen by Dr. Silva. Reports x-rays showed now fractures, also had MRI performed. Reports that she had no L knee pain before this. Reports that she has the most pain on lateral aspect of the knee. Reports she was having some locking/catching but that seems to be improving. Pt reports that walking and squatting causes increased pain. Reports that standing long periods or twisting to get out of car causes pain as well. Reports that the pain appears to be getting somewhat better because she can walk better now. Reports she was on crutches, but was cleared to walk without them. Reports she stopped using crutches week of 3/29/24. Reports that she limps. Reports that she does not have many stairs, only a few up to the porch. Reports she performs them slow and holds onto railings. Denies any swelling. Reports that resting and keeping knee straight relieves pain. Reports  that she is taking meloxicam and is not icing/heating. Denies any numbness/tingling. Reports that she will be following up with ortho next Wednesday.     Reports she feels like she can not take her dog for walks because she can not walk very far now.       Narrative & Impression  MRI LEFT KNEE     INDICATION:   M25.562: Pain in left knee  G89.29: Other chronic pain  S89.92XA: Unspecified injury of left lower leg, initial encounter.     COMPARISON: Correlation is made with the prior radiograph dated 3/25/2024.     TECHNIQUE:    Multiplanar/multisequence MR of the left knee was performed.  Imaging performed on 1.5T MRI     FINDINGS:     SUBCUTANEOUS TISSUES: Normal     JOINT EFFUSION: There is only a trace amount of joint fluid.     BAKER'S CYST: There is a small Baker's cyst. There is also some fluid seen within the semimembranosus/tibial collateral ligament bursa compatible with mild bursitis.     MENISCI: There is a complex tear of the posterior horn of the medial meniscus extending to the posterior root with predominant radial component. There is heterogeneous low signal intensity focus seen in the inferior medial gutter as seen on series 5,   image 15 possibly a displaced meniscal fragment.     There is also a complex tear through the posterior root of the lateral meniscus with a horizontal tear through the remaining body.     CRUCIATE LIGAMENTS: Intact.     EXTENSOR APPARATUS: Intact.     COLLATERAL LIGAMENTS: Intact. There is a thickened appearance of the distal iliotibial band likely from remote sprain.     ARTICULAR SURFACES: There is moderate patellofemoral compartment cartilage loss. There is moderate to severe cartilage loss along the weightbearing portion of the medial femoral tibial compartment. There is mild to moderate cartilage loss along the   weightbearing portion of the lateral femoral tibial compartment.     BONES: Tiny intraosseous ganglia seen along the central posterior tibial plateau. No  fracture or contusion.     MUSCULATURE:  Intact.     IMPRESSION:     Complex tear posterior root of the medial meniscus extending to the posterior root with a predominant radial component. Suspected displaced meniscal tissue in the inferior medial gutter.     Complex tear posterior root of the lateral meniscus with a horizontal tear through the remaining body.     Tricompartmental osteoarthrosis as above.     Small Baker's cyst. Mild semimembranosus/tibial collateral ligament bursitis.              Recurrent probem    Quality of life: good    Patient Goals  Patient goals for therapy: decreased pain, increased motion, increased strength, independence with ADLs/IADLs and return to sport/leisure activities  Patient goal: walk around development at least 3 times, decrease pain  Pain  Current pain rating: 3  At best pain ratin  At worst pain ratin  Location: lateral aspect of L knee  Quality: dull ache and sharp  Relieving factors: rest and medications  Aggravating factors: standing and walking  Progression: improved    Social Support  Steps to enter house: yes  Lives in: one-story house  Lives with: spouse    Employment status: not working    Diagnostic Tests  MRI studies: abnormal  Treatments  Previous treatment: medication  Current treatment: physical therapy        Objective     Tenderness   Left Knee   Tenderness in the lateral joint line and patellar tendon. No tenderness in the LCL (distal), LCL (proximal), MCL (distal), MCL (proximal) and medial joint line.     Neurological Testing     Sensation     Hip   Left Hip   Intact: light touch    Right Hip   Intact: light touch    Active Range of Motion   Left Knee   Flexion: 120 degrees with pain  Extension: 0 degrees     Right Knee   Normal active range of motion    Additional Active Range of Motion Details  Pain reported lateral aspect of L knee with L knee flexion     Passive Range of Motion   Left Knee   Normal passive range of motion    Right Knee   Normal  passive range of motion    Strength/Myotome Testing     Left Hip   Planes of Motion   Flexion: 4-  Extension: 4-  Abduction: 4-  Adduction: 4  External rotation: 4  Internal rotation: 4    Right Hip   Planes of Motion   Flexion: 4+  Extension: 4+  Abduction: 4+  Adduction: 4+  External rotation: 4+  Internal rotation: 4+    Left Knee   Flexion: 4-  Extension: 4-  Quadriceps contraction: good    Right Knee   Flexion: 5  Extension: 5  Quadriceps contraction: good    Tests     Left Knee   Positive lateral Da, medial Da, Thessaly's test at 5 degrees and Thessaly's test at 20 degrees.   Negative anterior drawer, posterior drawer, valgus stress test at 0 degrees, valgus stress test at 30 degrees, varus stress test at 0 degrees and varus stress test at 30 degrees.     Functional Assessment      Squat    Left valgus.              Precautions: R TKA, L JANET      Re-eval Date:  by 5/8/24    Date 4/9/2024        Visit Count 1       FOTO 4/9/2024        Pain In See IE       Pain Out See IE           Manuals 4/9/2024                                        Neuro Re-Ed        Tandem stance tandem walking                                                         Ther Ex        Nustep        Heel slides        Calf stretch reviewed       Hamstring stretch reviewed       Knee flexion step stretch        QS        HR        LAQ reviewed       SLR        Clamshells  reviewed       Bridges        Leg ext/flx machine         Leg press                         Ther Activity                        Gait Training                        Modalities        Cryo                       4/9/2024  - HEP was issued and reviewed this date for above noted exercises. Pt demonstrated understanding without incident and without questions/concerns. Will continue to update upcoming.

## 2024-04-09 ENCOUNTER — EVALUATION (OUTPATIENT)
Dept: PHYSICAL THERAPY | Facility: CLINIC | Age: 69
End: 2024-04-09
Payer: COMMERCIAL

## 2024-04-09 DIAGNOSIS — M17.12 PRIMARY OSTEOARTHRITIS OF LEFT KNEE: ICD-10-CM

## 2024-04-09 DIAGNOSIS — S83.204D OTHER TEAR OF MENISCUS OF LEFT KNEE, UNSPECIFIED MENISCUS, UNSPECIFIED WHETHER OLD OR CURRENT TEAR, SUBSEQUENT ENCOUNTER: ICD-10-CM

## 2024-04-09 DIAGNOSIS — M25.562 ACUTE PAIN OF LEFT KNEE: ICD-10-CM

## 2024-04-09 DIAGNOSIS — M25.561 ACUTE PAIN OF RIGHT KNEE: Primary | ICD-10-CM

## 2024-04-09 PROCEDURE — 97110 THERAPEUTIC EXERCISES: CPT

## 2024-04-09 PROCEDURE — 97161 PT EVAL LOW COMPLEX 20 MIN: CPT

## 2024-04-17 ENCOUNTER — OFFICE VISIT (OUTPATIENT)
Dept: PHYSICAL THERAPY | Facility: CLINIC | Age: 69
End: 2024-04-17
Payer: COMMERCIAL

## 2024-04-17 DIAGNOSIS — M25.562 ACUTE PAIN OF LEFT KNEE: ICD-10-CM

## 2024-04-17 DIAGNOSIS — M17.12 PRIMARY OSTEOARTHRITIS OF LEFT KNEE: ICD-10-CM

## 2024-04-17 DIAGNOSIS — S83.204D OTHER TEAR OF MENISCUS OF LEFT KNEE, UNSPECIFIED MENISCUS, UNSPECIFIED WHETHER OLD OR CURRENT TEAR, SUBSEQUENT ENCOUNTER: ICD-10-CM

## 2024-04-17 DIAGNOSIS — M25.561 ACUTE PAIN OF RIGHT KNEE: Primary | ICD-10-CM

## 2024-04-17 PROCEDURE — 97110 THERAPEUTIC EXERCISES: CPT

## 2024-04-17 NOTE — PROGRESS NOTES
"Daily Note     Today's date: 2024  Patient name: Zuleyka Douglas  : 1955  MRN: [id number]  Referring provider: Edvin Silva MD  Dx:   Encounter Diagnosis     ICD-10-CM    1. Acute pain of right knee  M25.561       2. Primary osteoarthritis of left knee  M17.12       3. Other tear of meniscus of left knee, unspecified meniscus, unspecified whether old or current tear, subsequent encounter  S83.204D       4. Acute pain of left knee  M25.562           Start Time: 1030  Stop Time: 1130  Total time in clinic (min): 60 minutes    Subjective: \"My knee hurts whenever I am doing activities, but right now it feels okay. My knee hurts when I am on it a lot. I have not felt it lock up lately\"       Objective: See treatment diary below      Assessment: Tolerated treatment well. Able to complete POC without incident. Tolerated progression of POC well today; no increase in baseline symptoms. Requiring cues for proper technique and proper muscle engagement with squats. Noted muscle weakness and fatigue of L hip/quadriceps>R. B gluteal musculature quick to fatigue. Additional exercises added to HEP and pt edcuated on importance of compliance. Will cont to progress as tolerated. Patient would benefit from continued PT      Plan: Continue per plan of care.  Progress treatment as tolerated.       Precautions: R TKA, L JANET      Re-eval Date:  by 24    Date 2024      Visit Count 1 2      FOTO 2024        Pain In See IE 0/10       Pain Out See IE 0/10          Manuals 2024                                      Neuro Re-Ed        Tandem stance tandem walking                                                         Ther Ex        Nustep  L3 10'       Heel slides        Calf stretch reviewed With wedge 3x30\"       Hamstring stretch reviewed 3x30\"       Knee flexion step stretch        QS        HR        LAQ reviewed       SLR  2x10 supine hip flexion    Standing hip abduction and hip extension 2x10     " "  Lauryn  reviewed 2x10      Bridges        Leg ext/flx machine   2x10 11#         Leg press   2x10 40#       EOT stretch   3x30\"       Side steps  Rtb x3 laps      Monster walks  Rtb x3 laps      Step ups  Fwd 2x10 6\" step     Lat 2x10 6\" step               Ther Activity                        Gait Training                        Modalities        Cryo                       4/9/2024  - HEP was issued and reviewed this date for above noted exercises. Pt demonstrated understanding without incident and without questions/concerns. Will continue to update upcoming.           "

## 2024-04-18 ENCOUNTER — OFFICE VISIT (OUTPATIENT)
Dept: GASTROENTEROLOGY | Facility: CLINIC | Age: 69
End: 2024-04-18
Payer: COMMERCIAL

## 2024-04-18 VITALS
TEMPERATURE: 98.2 F | OXYGEN SATURATION: 99 % | SYSTOLIC BLOOD PRESSURE: 108 MMHG | HEART RATE: 58 BPM | DIASTOLIC BLOOD PRESSURE: 64 MMHG | BODY MASS INDEX: 21.97 KG/M2 | HEIGHT: 63 IN | WEIGHT: 124 LBS

## 2024-04-18 DIAGNOSIS — K21.9 GASTROESOPHAGEAL REFLUX DISEASE, UNSPECIFIED WHETHER ESOPHAGITIS PRESENT: ICD-10-CM

## 2024-04-18 DIAGNOSIS — E46 PROTEIN-CALORIE MALNUTRITION, UNSPECIFIED SEVERITY (HCC): ICD-10-CM

## 2024-04-18 DIAGNOSIS — Z51.81 MEDICATION MONITORING ENCOUNTER: ICD-10-CM

## 2024-04-18 DIAGNOSIS — Z12.11 SCREEN FOR COLON CANCER: Primary | ICD-10-CM

## 2024-04-18 PROCEDURE — 99203 OFFICE O/P NEW LOW 30 MIN: CPT | Performed by: PHYSICIAN ASSISTANT
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
## 2024-04-18 NOTE — PATIENT INSTRUCTIONS
You are due this year for your colonoscopy given in 10 years.  We will proceed with the MiraLAX/Dulcolax/Gatorade bowel prep.  The miralax bowel prep in 238g bottle (14 servings).       Given the duration of your GERD history and the fact that you have needed to remain on omeprazole, we are going to check a 1 EGD to look for abnormalities in your upper GI tract.  Given long-term use of omeprazole, I am checking magnesium and B12 levels.

## 2024-04-22 ENCOUNTER — HOSPITAL ENCOUNTER (OUTPATIENT)
Dept: RADIOLOGY | Facility: IMAGING CENTER | Age: 69
Discharge: HOME | End: 2024-04-22
Payer: COMMERCIAL

## 2024-04-22 VITALS — HEIGHT: 62 IN | WEIGHT: 120 LBS | BODY MASS INDEX: 22.08 KG/M2

## 2024-04-22 DIAGNOSIS — Z12.31 ENCOUNTER FOR SCREENING MAMMOGRAM FOR MALIGNANT NEOPLASM OF BREAST: ICD-10-CM

## 2024-04-22 PROCEDURE — 77067 SCR MAMMO BI INCL CAD: CPT

## 2024-04-22 PROCEDURE — 77063 BREAST TOMOSYNTHESIS BI: CPT

## 2024-04-24 ENCOUNTER — APPOINTMENT (OUTPATIENT)
Dept: PHYSICAL THERAPY | Facility: CLINIC | Age: 69
End: 2024-04-24
Payer: COMMERCIAL

## 2024-05-01 ENCOUNTER — OFFICE VISIT (OUTPATIENT)
Dept: PHYSICAL THERAPY | Facility: CLINIC | Age: 69
End: 2024-05-01
Payer: COMMERCIAL

## 2024-05-01 DIAGNOSIS — S83.204D OTHER TEAR OF MENISCUS OF LEFT KNEE, UNSPECIFIED MENISCUS, UNSPECIFIED WHETHER OLD OR CURRENT TEAR, SUBSEQUENT ENCOUNTER: ICD-10-CM

## 2024-05-01 DIAGNOSIS — M25.561 ACUTE PAIN OF RIGHT KNEE: Primary | ICD-10-CM

## 2024-05-01 DIAGNOSIS — M17.12 PRIMARY OSTEOARTHRITIS OF LEFT KNEE: ICD-10-CM

## 2024-05-01 PROCEDURE — 97112 NEUROMUSCULAR REEDUCATION: CPT

## 2024-05-01 PROCEDURE — 97110 THERAPEUTIC EXERCISES: CPT

## 2024-05-01 NOTE — PROGRESS NOTES
"Daily Note     Today's date: 2024  Patient name: Zuleyka Douglas  : 1955  MRN: [id number]  Referring provider: Edvin Silva MD  Dx:   Encounter Diagnosis     ICD-10-CM    1. Acute pain of right knee  M25.561       2. Primary osteoarthritis of left knee  M17.12       3. Other tear of meniscus of left knee, unspecified meniscus, unspecified whether old or current tear, subsequent encounter  S83.204D           Start Time: 1049  Stop Time: 1127  Total time in clinic (min): 38 minutes    Subjective: \"My knee feels pretty good.  I went shopping and walked a lot without problems.\"      Objective: See treatment diary below      Assessment: Tolerated treatment well. Able to complete program without exacerbation of symptoms.  Added resistance to clamshells with appropriate challenge.  Anticipate discharge next session if no change in symptoms.  Will continue to monitor and progress as able.  Patient demonstrated fatigue post treatment and would benefit from continued PT      Plan: Continue per plan of care.  Progress treatment as tolerated.       Precautions: R TKA, L JANET      Re-eval Date:  by 24    Date 2024     Visit Count 1 2 3/10     FOTO 2024        Pain In See IE 0/10  0     Pain Out See IE 0/10 0         Manuals 2024                                     Neuro Re-Ed        Tandem stance tandem walking                                                         Ther Ex        Nustep  L3 10'  L3 10'      Heel slides        Calf stretch reviewed With wedge 3x30\"  With wedge 3x30\"      Hamstring stretch reviewed 3x30\"  3x30\"      Knee flexion step stretch        QS        HR        LAQ reviewed       SLR  2x10 supine hip flexion    Standing hip abduction and hip extension 2x10  3x10 supine hip flexion    Standing hip abduction and hip extension 3x10      Clamshells  reviewed 2x10 Red 2x10     Bridges        Leg ext/flx machine   2x10 11#    Knee ext  11# 3x10  Knee flex     Leg " "press   2x10 40#  40# 3x10     EOT stretch   3x30\"  3x30\"      Side steps  Rtb x3 laps Rtb x3 laps     Monster walks  Rtb x3 laps Rtb x3 laps     Step ups  Fwd 2x10 6\" step     Lat 2x10 6\" step  Fwd 2x10 6\" step     Lat 2x10 6\" step              Ther Activity                        Gait Training                        Modalities        Cryo                       4/9/2024  - [facility] was issued and reviewed this date for above noted exercises. Pt demonstrated understanding without incident and without questions/concerns. Will continue to update upcoming.             "

## 2024-05-03 DIAGNOSIS — M17.11 PRIMARY OSTEOARTHRITIS OF RIGHT KNEE: ICD-10-CM

## 2024-05-03 DIAGNOSIS — S83.203D OTHER TEAR OF MENISCUS OF RIGHT KNEE, UNSPECIFIED MENISCUS, UNSPECIFIED WHETHER OLD OR CURRENT TEAR, SUBSEQUENT ENCOUNTER: ICD-10-CM

## 2024-05-03 DIAGNOSIS — M25.561 ACUTE PAIN OF RIGHT KNEE: ICD-10-CM

## 2024-05-03 RX ORDER — MELOXICAM 15 MG/1
15 TABLET ORAL DAILY
Qty: 30 TABLET | Refills: 0 | Status: SHIPPED | OUTPATIENT
Start: 2024-05-03

## 2024-05-03 NOTE — TELEPHONE ENCOUNTER
Reason for call:   [x] Refill   [] Prior Auth  [] Other:     Office:   [] PCP/Provider -   [x] Specialty/Provider -     Medication: meloxicam (Mobic) 15 mg tablet     Dose/Frequency:   15 mg, Oral, Daily      Quantity: 30    Pharmacy: [id number] - [address]     Does the patient have enough for 3 days?   [] Yes   [x] No - Send as HP to POD

## 2024-05-06 ENCOUNTER — APPOINTMENT (OUTPATIENT)
Dept: LAB | Facility: CLINIC | Age: 69
End: 2024-05-06
Payer: COMMERCIAL

## 2024-05-06 DIAGNOSIS — K21.9 GASTROESOPHAGEAL REFLUX DISEASE, UNSPECIFIED WHETHER ESOPHAGITIS PRESENT: ICD-10-CM

## 2024-05-06 DIAGNOSIS — Z86.2 HISTORY OF THROMBOCYTOSIS: ICD-10-CM

## 2024-05-06 DIAGNOSIS — E78.5 HYPERLIPIDEMIA, UNSPECIFIED HYPERLIPIDEMIA TYPE: ICD-10-CM

## 2024-05-06 DIAGNOSIS — E55.9 VITAMIN D DEFICIENCY: ICD-10-CM

## 2024-05-06 DIAGNOSIS — Z79.899 LONG-TERM USE OF HIGH-RISK MEDICATION: ICD-10-CM

## 2024-05-06 DIAGNOSIS — Z13.1 ENCOUNTER FOR SCREENING EXAMINATION FOR IMPAIRED GLUCOSE REGULATION AND DIABETES MELLITUS: ICD-10-CM

## 2024-05-06 DIAGNOSIS — Z13.0 SCREENING FOR DEFICIENCY ANEMIA: ICD-10-CM

## 2024-05-06 DIAGNOSIS — E46 PROTEIN-CALORIE MALNUTRITION, UNSPECIFIED SEVERITY (HCC): ICD-10-CM

## 2024-05-06 DIAGNOSIS — Z51.81 ENCOUNTER FOR MONITORING DENOSUMAB THERAPY: ICD-10-CM

## 2024-05-06 DIAGNOSIS — Z79.899 ENCOUNTER FOR MONITORING DENOSUMAB THERAPY: ICD-10-CM

## 2024-05-06 DIAGNOSIS — Z51.81 MEDICATION MONITORING ENCOUNTER: ICD-10-CM

## 2024-05-06 DIAGNOSIS — Z13.29 THYROID DISORDER SCREEN: ICD-10-CM

## 2024-05-06 LAB
25(OH)D3 SERPL-MCNC: 55.5 NG/ML (ref 30–100)
ALBUMIN SERPL BCP-MCNC: 4.1 G/DL (ref 3.5–5)
ALP SERPL-CCNC: 36 U/L (ref 34–104)
ALT SERPL W P-5'-P-CCNC: 7 U/L (ref 7–52)
ANION GAP SERPL CALCULATED.3IONS-SCNC: 4 MMOL/L (ref 4–13)
AST SERPL W P-5'-P-CCNC: 11 U/L (ref 13–39)
BASOPHILS # BLD AUTO: 0.08 THOUSANDS/ÂΜL (ref 0–0.1)
BASOPHILS NFR BLD AUTO: 1 % (ref 0–1)
BILIRUB SERPL-MCNC: 0.52 MG/DL (ref 0.2–1)
BUN SERPL-MCNC: 15 MG/DL (ref 5–25)
CALCIUM SERPL-MCNC: 10.1 MG/DL (ref 8.4–10.2)
CHLORIDE SERPL-SCNC: 107 MMOL/L (ref 96–108)
CHOLEST SERPL-MCNC: 220 MG/DL
CO2 SERPL-SCNC: 30 MMOL/L (ref 21–32)
CREAT SERPL-MCNC: 0.91 MG/DL (ref 0.6–1.3)
EOSINOPHIL # BLD AUTO: 0.37 THOUSAND/ÂΜL (ref 0–0.61)
EOSINOPHIL NFR BLD AUTO: 5 % (ref 0–6)
ERYTHROCYTE [DISTWIDTH] IN BLOOD BY AUTOMATED COUNT: 12.2 % (ref 11.6–15.1)
GFR SERPL CREATININE-BSD FRML MDRD: 64 ML/MIN/1.73SQ M
GLUCOSE P FAST SERPL-MCNC: 86 MG/DL (ref 65–99)
HCT VFR BLD AUTO: 41.4 % (ref 34.8–46.1)
HDLC SERPL-MCNC: 58 MG/DL
HGB BLD-MCNC: 13.2 G/DL (ref 11.5–15.4)
IMM GRANULOCYTES # BLD AUTO: 0.02 THOUSAND/UL (ref 0–0.2)
IMM GRANULOCYTES NFR BLD AUTO: 0 % (ref 0–2)
LDLC SERPL CALC-MCNC: 137 MG/DL (ref 0–100)
LYMPHOCYTES # BLD AUTO: 2.29 THOUSANDS/ÂΜL (ref 0.6–4.47)
LYMPHOCYTES NFR BLD AUTO: 33 % (ref 14–44)
MAGNESIUM SERPL-MCNC: 2.1 MG/DL (ref 1.9–2.7)
MCH RBC QN AUTO: 29.7 PG (ref 26.8–34.3)
MCHC RBC AUTO-ENTMCNC: 31.9 G/DL (ref 31.4–37.4)
MCV RBC AUTO: 93 FL (ref 82–98)
MONOCYTES # BLD AUTO: 0.55 THOUSAND/ÂΜL (ref 0.17–1.22)
MONOCYTES NFR BLD AUTO: 8 % (ref 4–12)
NEUTROPHILS # BLD AUTO: 3.61 THOUSANDS/ÂΜL (ref 1.85–7.62)
NEUTS SEG NFR BLD AUTO: 53 % (ref 43–75)
NONHDLC SERPL-MCNC: 162 MG/DL
NRBC BLD AUTO-RTO: 0 /100 WBCS
PLATELET # BLD AUTO: 388 THOUSANDS/UL (ref 149–390)
PMV BLD AUTO: 9.6 FL (ref 8.9–12.7)
POTASSIUM SERPL-SCNC: 4.6 MMOL/L (ref 3.5–5.3)
PROT SERPL-MCNC: 6.8 G/DL (ref 6.4–8.4)
RBC # BLD AUTO: 4.45 MILLION/UL (ref 3.81–5.12)
SODIUM SERPL-SCNC: 141 MMOL/L (ref 135–147)
TRIGL SERPL-MCNC: 125 MG/DL
TSH SERPL DL<=0.05 MIU/L-ACNC: 1.55 UIU/ML (ref 0.45–4.5)
VIT B12 SERPL-MCNC: 158 PG/ML (ref 180–914)
WBC # BLD AUTO: 6.92 THOUSAND/UL (ref 4.31–10.16)

## 2024-05-06 PROCEDURE — 85025 COMPLETE CBC W/AUTO DIFF WBC: CPT

## 2024-05-06 PROCEDURE — 82306 VITAMIN D 25 HYDROXY: CPT

## 2024-05-06 PROCEDURE — 36415 COLL VENOUS BLD VENIPUNCTURE: CPT

## 2024-05-06 PROCEDURE — 80053 COMPREHEN METABOLIC PANEL: CPT

## 2024-05-06 PROCEDURE — 84443 ASSAY THYROID STIM HORMONE: CPT

## 2024-05-06 PROCEDURE — 83735 ASSAY OF MAGNESIUM: CPT

## 2024-05-06 PROCEDURE — 82607 VITAMIN B-12: CPT

## 2024-05-06 PROCEDURE — 80061 LIPID PANEL: CPT

## 2024-05-07 NOTE — PROGRESS NOTES
"PT Re-Evaluation  and PT Discharge    Today's date: 2024  Patient name: Zuleyka Douglas  : 1955  MRN: [id number]  Referring provider: Edvin Silva MD  Dx:   Encounter Diagnosis     ICD-10-CM    1. Acute pain of right knee  M25.561       2. Primary osteoarthritis of left knee  M17.12       3. Other tear of meniscus of left knee, unspecified meniscus, unspecified whether old or current tear, subsequent encounter  S83.204D       4. Acute pain of left knee  M25.562             Start Time: 1045  Stop Time: 1130  Total time in clinic (min): 45 minutes    Assessment  Assessment details: Zuleyka Douglas has been consistent and complaint with attending PT sessions and performing HEP. Pt has made steady progress with skilled PT services. Noted significant improvements in hip/knee/glute strength since SOC, along with knee ROM. Both WNL now. Significant decrease in pain levels with ADLs/IADLs, HH responsibilities, yard work, walking, and standing. Pt reports ability to stand and walk for significantly longer periods before experiencing discomfort. Pt reports she is now able to walk dog without discomfort as well. No pain with squatting, bending, and kneeling. Pt has achieved all STG/LTG. Pt reporting readiness for PT discharge; due to significant improvements in impairments since SOC, plan to d/c and transition to HEP. Pt understanding and in agreement. Exam findings discussed with pt and all questions answered to pt satisfaction. Pt was provided copy of updated HEP. Educated to contact clinic with any questions or concerns.        Impairments: abnormal gait, activity intolerance, impaired balance, impaired physical strength, lacks appropriate home exercise program and pain with function  Understanding of Dx/Px/POC: good   Prognosis: good    Goals  STGs to be achieved in 4 weeks:  1. Pt to demonstrate reduced subjective pain rating \"at worst\" by at least 2-3 points from Initial Eval in order to allow for reduced pain " with ADLs and improved functional activity tolerance. MET  2. Initiate HEP with pt understanding and follow-though MET  3. Pt will report decrease in L knee symptoms by at least 25% to facilitate improved function. MET  4. Pt to demonstrate increased MMT of L knee flexion and extension by at least 1/2-1 grade in order to improve safety and stability with ADLs and functional mobility. MET  5. Pt to demonstrate increased MMT of L hip flexion,extension, and abduction by at least 1/2-1 grade in order to improve safety and stability with ADLs and functional mobility. MET      LTGs to be achieved by discharge:  1. Pt will be I with HEP in order to continue to improve quality of life and independence and reduce risk for re-injury. MET  2. Pt to demonstrate return to pre-injury ADL/IADL performance without limitations or restrictions. MET  3. Pt to demonstrate normalized gait pattern with no evidence of antalgic gait to demonstrate return to PLOF MET  4. Pt will demonstrate 4+-5/5 MMT of L knee strength to demonstrate improved overall LE function.  MET  5. Pt to demonstrate ability to walk at least 30 minutes without limitations due to L knee pain to show improved function. MET    Plan  Plan details: D/c and transition to HEP  Plan of Care beginning date: 5/8/2024  Treatment plan discussed with: patient        Subjective Evaluation    History of Present Illness  Mechanism of injury: Pt is presenting to OPPT with chief complaint of L knee pain. Reports pain started about 2 weeks ago, reports she took her dog outside and her L knee got twisted and she fell on back porch. Reports that she could not walk at all after she fell. Reports that she went to the ER; reports she was walking with crutches afterwards. Reports that she was then was seen by Dr. Silva. Reports x-rays showed now fractures, also had MRI performed. Reports that she had no L knee pain before this. Reports that she has the most pain on lateral aspect of the  knee. Reports she was having some locking/catching but that seems to be improving. Pt reports that walking and squatting causes increased pain. Reports that standing long periods or twisting to get out of car causes pain as well. Reports that the pain appears to be getting somewhat better because she can walk better now. Reports she was on crutches, but was cleared to walk without them. Reports she stopped using crutches week of 3/29/24. Reports that she limps. Reports that she does not have many stairs, only a few up to the porch. Reports she performs them slow and holds onto railings. Denies any swelling. Reports that resting and keeping knee straight relieves pain. Reports that she is taking meloxicam and is not icing/heating. Denies any numbness/tingling. Reports that she will be following up with ortho next Wednesday.     Reports she feels like she can not take her dog for walks because she can not walk very far now.       Narrative & Impression  MRI LEFT KNEE     INDICATION:   M25.562: Pain in left knee  G89.29: Other chronic pain  S89.92XA: Unspecified injury of left lower leg, initial encounter.     COMPARISON: Correlation is made with the prior radiograph dated 3/25/2024.     TECHNIQUE:    Multiplanar/multisequence MR of the left knee was performed.  Imaging performed on 1.5T MRI     FINDINGS:     SUBCUTANEOUS TISSUES: Normal     JOINT EFFUSION: There is only a trace amount of joint fluid.     BAKER'S CYST: There is a small Baker's cyst. There is also some fluid seen within the semimembranosus/tibial collateral ligament bursa compatible with mild bursitis.     MENISCI: There is a complex tear of the posterior horn of the medial meniscus extending to the posterior root with predominant radial component. There is heterogeneous low signal intensity focus seen in the inferior medial gutter as seen on series 5,   image 15 possibly a displaced meniscal fragment.     There is also a complex tear through the  posterior root of the lateral meniscus with a horizontal tear through the remaining body.     CRUCIATE LIGAMENTS: Intact.     EXTENSOR APPARATUS: Intact.     COLLATERAL LIGAMENTS: Intact. There is a thickened appearance of the distal iliotibial band likely from remote sprain.     ARTICULAR SURFACES: There is moderate patellofemoral compartment cartilage loss. There is moderate to severe cartilage loss along the weightbearing portion of the medial femoral tibial compartment. There is mild to moderate cartilage loss along the   weightbearing portion of the lateral femoral tibial compartment.     BONES: Tiny intraosseous ganglia seen along the central posterior tibial plateau. No fracture or contusion.     MUSCULATURE:  Intact.     IMPRESSION:     Complex tear posterior root of the medial meniscus extending to the posterior root with a predominant radial component. Suspected displaced meniscal tissue in the inferior medial gutter.     Complex tear posterior root of the lateral meniscus with a horizontal tear through the remaining body.     Tricompartmental osteoarthrosis as above.     Small Baker's cyst. Mild semimembranosus/tibial collateral ligament bursitis.    5/8/24: Pt reports knee is feeling much better since SOC. Reports pain is not as severe and pain is not nearly as frequent.. Reports she has no pain now with kneeling, squatting, and stair climbing. Reporting she does still have some pain with walking and standing but only if it is for really long periods. Reports she is able to perform yard work and take her dog for a walk with minimal pain. Reports she can kneel and squat without pain now. Reports she feels she can perform all ADLs/IADLs with minimal to no pain. Reports she feels ready to be discharged from PT.           Recurrent probem    Quality of life: good    Patient Goals  Patient goals for therapy: decreased pain, increased motion, increased strength, independence with ADLs/IADLs and return to  sport/leisure activities  Patient goal: walk around development at least 3 times MET, decrease pain MET  Pain  Current pain ratin  At best pain ratin  At worst pain ratin  Location: lateral aspect of L knee  Quality: dull ache and sharp  Relieving factors: rest and medications  Aggravating factors: standing and walking  Progression: improved    Social Support  Steps to enter house: yes  Lives in: one-story house  Lives with: spouse    Employment status: not working    Diagnostic Tests  MRI studies: abnormal  Treatments  Previous treatment: medication  Current treatment: physical therapy        Objective     Neurological Testing     Sensation     Hip   Left Hip   Intact: light touch    Right Hip   Intact: light touch    Active Range of Motion   Left Knee   Normal active range of motion  Flexion: 138 degrees   Extension: 0 degrees     Right Knee   Normal active range of motion    Passive Range of Motion   Left Knee   Normal passive range of motion    Right Knee   Normal passive range of motion    Strength/Myotome Testing     Left Hip   Planes of Motion   Flexion: 4+  Extension: 4+  Abduction: 4+  Adduction: 4+  External rotation: 4+  Internal rotation: 4+    Right Hip   Planes of Motion   Flexion: 4+  Extension: 4+  Abduction: 4+  Adduction: 4+  External rotation: 4+  Internal rotation: 4+    Left Knee   Flexion: 4+  Extension: 4+  Quadriceps contraction: good    Right Knee   Flexion: 5  Extension: 5  Quadriceps contraction: good    Functional Assessment      Squat    Left valgus.              Precautions: R TKA, L JANET      Re-eval Date:  by 24     Date 2024     Visit Count 1 2 3/10  4/10      FOTO 2024            Pain In See IE 0/10  0  0     Pain Out See  0  0           Manuals 2024                                                             Neuro Re-Ed             Tandem stance tandem walking                                                              "                                     Ther Ex             Nustep   L3 10'  L3 10'   L3 10'      Heel slides             Calf stretch reviewed With wedge 3x30\"  With wedge 3x30\"   with wedge 3x30\"      Hamstring stretch reviewed 3x30\"  3x30\"   2x30\"      Knee flexion step stretch             QS             HR             LAQ reviewed           SLR   2x10 supine hip flexion     Standing hip abduction and hip extension 2x10  3x10 supine hip flexion     Standing hip abduction and hip extension 3x10   3x10 supine hip flexion    Side lying hip abduction 2x10      Clamshells  reviewed 2x10 Red 2x10       Bridges             Leg ext/flx machine    2x10 11#     Knee ext  11# 3x10  Knee flex  Knee ext  22# 3x10  Knee flex 22#     Leg press    2x10 40#  40# 3x10  50# 2x10      EOT stretch    3x30\"  3x30\"   3x30\"      Side steps   Rtb x3 laps Rtb x3 laps  gtb x3 laps      Monster walks   Rtb x3 laps Rtb x3 laps  gtb x3 laps      Step ups   Fwd 2x10 6\" step      Lat 2x10 6\" step  Fwd 2x10 6\" step      Lat 2x10 6\" step                      Ther Activity                                         Gait Training                                         Modalities             Cryo                                    "

## 2024-05-08 ENCOUNTER — EVALUATION (OUTPATIENT)
Dept: PHYSICAL THERAPY | Facility: CLINIC | Age: 69
End: 2024-05-08
Payer: COMMERCIAL

## 2024-05-08 DIAGNOSIS — M25.562 ACUTE PAIN OF LEFT KNEE: ICD-10-CM

## 2024-05-08 DIAGNOSIS — M17.12 PRIMARY OSTEOARTHRITIS OF LEFT KNEE: ICD-10-CM

## 2024-05-08 DIAGNOSIS — S83.204D OTHER TEAR OF MENISCUS OF LEFT KNEE, UNSPECIFIED MENISCUS, UNSPECIFIED WHETHER OLD OR CURRENT TEAR, SUBSEQUENT ENCOUNTER: ICD-10-CM

## 2024-05-08 DIAGNOSIS — M25.561 ACUTE PAIN OF RIGHT KNEE: Primary | ICD-10-CM

## 2024-05-08 PROCEDURE — 97110 THERAPEUTIC EXERCISES: CPT

## 2024-05-10 DIAGNOSIS — F51.01 PRIMARY INSOMNIA: ICD-10-CM

## 2024-05-10 RX ORDER — TRAZODONE HYDROCHLORIDE 50 MG/1
50 TABLET ORAL
Qty: 30 TABLET | Refills: 5 | Status: SHIPPED | OUTPATIENT
Start: 2024-05-10

## 2024-05-13 ENCOUNTER — OFFICE VISIT (OUTPATIENT)
Dept: OBGYN CLINIC | Facility: CLINIC | Age: 69
End: 2024-05-13
Payer: COMMERCIAL

## 2024-05-13 VITALS
SYSTOLIC BLOOD PRESSURE: 126 MMHG | HEART RATE: 75 BPM | BODY MASS INDEX: 22.08 KG/M2 | WEIGHT: 120 LBS | DIASTOLIC BLOOD PRESSURE: 84 MMHG | HEIGHT: 62 IN

## 2024-05-13 DIAGNOSIS — M17.12 PRIMARY OSTEOARTHRITIS OF LEFT KNEE: Primary | ICD-10-CM

## 2024-05-13 PROCEDURE — 99213 OFFICE O/P EST LOW 20 MIN: CPT | Performed by: STUDENT IN AN ORGANIZED HEALTH CARE EDUCATION/TRAINING PROGRAM
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
## 2024-05-22 ENCOUNTER — HOSPITAL ENCOUNTER (OUTPATIENT)
Dept: GASTROENTEROLOGY | Facility: HOSPITAL | Age: 69
Setting detail: OUTPATIENT SURGERY
Discharge: HOME | End: 2024-05-22
Admitting: INTERNAL MEDICINE
Payer: COMMERCIAL

## 2024-05-22 ENCOUNTER — ANESTHESIA (OUTPATIENT)
Dept: GASTROENTEROLOGY | Facility: HOSPITAL | Age: 69
End: 2024-05-22

## 2024-05-22 ENCOUNTER — ANESTHESIA EVENT (OUTPATIENT)
Dept: GASTROENTEROLOGY | Facility: HOSPITAL | Age: 69
End: 2024-05-22

## 2024-05-22 VITALS
TEMPERATURE: 97.2 F | OXYGEN SATURATION: 95 % | HEART RATE: 73 BPM | DIASTOLIC BLOOD PRESSURE: 56 MMHG | RESPIRATION RATE: 18 BRPM | SYSTOLIC BLOOD PRESSURE: 119 MMHG

## 2024-05-22 DIAGNOSIS — K21.9 GASTROESOPHAGEAL REFLUX DISEASE, UNSPECIFIED WHETHER ESOPHAGITIS PRESENT: ICD-10-CM

## 2024-05-22 DIAGNOSIS — K21.9 GASTROESOPHAGEAL REFLUX DISEASE: ICD-10-CM

## 2024-05-22 DIAGNOSIS — Z12.11 SCREEN FOR COLON CANCER: ICD-10-CM

## 2024-05-22 PROCEDURE — 43239 EGD BIOPSY SINGLE/MULTIPLE: CPT | Performed by: INTERNAL MEDICINE

## 2024-05-22 PROCEDURE — 43251 EGD REMOVE LESION SNARE: CPT | Performed by: INTERNAL MEDICINE

## 2024-05-22 PROCEDURE — 88305 TISSUE EXAM BY PATHOLOGIST: CPT | Performed by: PATHOLOGY

## 2024-05-22 PROCEDURE — G0121 COLON CA SCRN NOT HI RSK IND: HCPCS | Performed by: INTERNAL MEDICINE

## 2024-05-22 RX ORDER — PROPOFOL 10 MG/ML
INJECTION, EMULSION INTRAVENOUS AS NEEDED
Status: DISCONTINUED | OUTPATIENT
Start: 2024-05-22 | End: 2024-05-22

## 2024-05-22 RX ORDER — SODIUM CHLORIDE, SODIUM LACTATE, POTASSIUM CHLORIDE, CALCIUM CHLORIDE 600; 310; 30; 20 MG/100ML; MG/100ML; MG/100ML; MG/100ML
125 INJECTION, SOLUTION INTRAVENOUS CONTINUOUS
Status: DISCONTINUED | OUTPATIENT
Start: 2024-05-22 | End: 2024-05-26 | Stop reason: HOSPADM

## 2024-05-22 RX ORDER — OMEPRAZOLE 20 MG/1
40 CAPSULE, DELAYED RELEASE ORAL DAILY
Qty: 30 CAPSULE | Refills: 5 | Status: SHIPPED | OUTPATIENT
Start: 2024-05-22

## 2024-05-22 RX ORDER — PROPOFOL 10 MG/ML
INJECTION, EMULSION INTRAVENOUS CONTINUOUS PRN
Status: DISCONTINUED | OUTPATIENT
Start: 2024-05-22 | End: 2024-05-22

## 2024-05-22 RX ORDER — LIDOCAINE HYDROCHLORIDE 20 MG/ML
INJECTION, SOLUTION EPIDURAL; INFILTRATION; INTRACAUDAL; PERINEURAL AS NEEDED
Status: DISCONTINUED | OUTPATIENT
Start: 2024-05-22 | End: 2024-05-22

## 2024-05-22 RX ORDER — SODIUM CHLORIDE, SODIUM LACTATE, POTASSIUM CHLORIDE, CALCIUM CHLORIDE 600; 310; 30; 20 MG/100ML; MG/100ML; MG/100ML; MG/100ML
INJECTION, SOLUTION INTRAVENOUS CONTINUOUS PRN
Status: DISCONTINUED | OUTPATIENT
Start: 2024-05-22 | End: 2024-05-22

## 2024-05-22 RX ADMIN — PROPOFOL 50 MG: 10 INJECTION, EMULSION INTRAVENOUS at 09:22

## 2024-05-22 RX ADMIN — PROPOFOL 50 MG: 10 INJECTION, EMULSION INTRAVENOUS at 09:27

## 2024-05-22 RX ADMIN — SODIUM CHLORIDE, SODIUM LACTATE, POTASSIUM CHLORIDE, AND CALCIUM CHLORIDE 125 ML/HR: .6; .31; .03; .02 INJECTION, SOLUTION INTRAVENOUS at 09:01

## 2024-05-22 RX ADMIN — PROPOFOL 50 MG: 10 INJECTION, EMULSION INTRAVENOUS at 09:14

## 2024-05-22 RX ADMIN — PROPOFOL 50 MG: 10 INJECTION, EMULSION INTRAVENOUS at 09:18

## 2024-05-22 RX ADMIN — SODIUM CHLORIDE, SODIUM LACTATE, POTASSIUM CHLORIDE, AND CALCIUM CHLORIDE: .6; .31; .03; .02 INJECTION, SOLUTION INTRAVENOUS at 08:50

## 2024-05-22 RX ADMIN — PROPOFOL 100 MCG/KG/MIN: 10 INJECTION, EMULSION INTRAVENOUS at 09:26

## 2024-05-22 RX ADMIN — LIDOCAINE HYDROCHLORIDE 100 MG: 20 INJECTION, SOLUTION EPIDURAL; INFILTRATION; INTRACAUDAL; PERINEURAL at 09:11

## 2024-05-22 RX ADMIN — PROPOFOL 150 MG: 10 INJECTION, EMULSION INTRAVENOUS at 09:11

## 2024-05-22 NOTE — ANESTHESIA PREPROCEDURE EVALUATION
Procedure:  COLONOSCOPY  EGD    Relevant Problems   ANESTHESIA   (+) PONV (postoperative nausea and vomiting)      CARDIO   (+) Hyperlipidemia      GI/HEPATIC   (+) Esophageal reflux      /RENAL   (+) Hydroureteronephrosis      MUSCULOSKELETAL   (+) Back pain   (+) Degenerative lumbar disc   (+) Primary generalized (osteo)arthritis   (+) Sacroiliitis (HCC)   (+) Sciatica of right side   (+) Unilateral osteoarthritis of hip, left        Physical Exam    Airway    Mallampati score: III  TM Distance: >3 FB  Neck ROM: full     Dental   No notable dental hx     Cardiovascular  Cardiovascular exam normal    Pulmonary  Pulmonary exam normal     Other Findings  post-pubertal.      Anesthesia Plan  ASA Score- 2     Anesthesia Type- IV sedation with anesthesia with ASA Monitors.         Additional Monitors:     Airway Plan:            Plan Factors-Exercise tolerance (METS): >4 METS.    Chart reviewed.    Patient summary reviewed.    Patient is not a current smoker.              Induction- intravenous.    Postoperative Plan-         Informed Consent- Anesthetic plan and risks discussed with patient.
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
## 2024-05-24 PROCEDURE — 88305 TISSUE EXAM BY PATHOLOGIST: CPT | Performed by: PATHOLOGY

## 2024-06-05 ENCOUNTER — OFFICE VISIT (OUTPATIENT)
Dept: RHEUMATOLOGY | Facility: CLINIC | Age: 69
End: 2024-06-05
Payer: COMMERCIAL

## 2024-06-05 DIAGNOSIS — M81.8 OTHER OSTEOPOROSIS WITHOUT CURRENT PATHOLOGICAL FRACTURE: Primary | ICD-10-CM

## 2024-06-05 PROCEDURE — 96372 THER/PROPH/DIAG INJ SC/IM: CPT

## 2024-06-05 NOTE — PROGRESS NOTES
Assessment/Plan:    Zuleyka Douglas came into the [facility] Rheumatology Office today 06/05/24 to receive Prolia injection.      Verbal consent obtained.  Consent given by: patient    patient states patient has been medically healthy with no underlining concerns/complications.      Zuleyka Douglas presents with no symptoms today.       All insturctions were reviewed with the patient.    If the patient should have any questions/concerns, advised patient to contacted [facility].       Subjective:     History provided by: patient    Patient ID: Zuleyka Douglas is a 69 y.o. female      Objective:    There were no vitals filed for this visit.    Patient tolerated the injection well without any complications.  Injection site/s left arm.  Medication was provided by providers stock.    Patient signed consent form yes   Patient signed ABN form yes (If no patient is not a medicare patient).   Patient waited 15 minutes after injection yes (This only applies to patient's receiving first time injection).       Last Visit: Visit date not found  Next visit:Visit date not found

## 2024-06-06 ENCOUNTER — RA CDI HCC (OUTPATIENT)
Dept: OTHER | Facility: HOSPITAL | Age: 69
End: 2024-06-06

## 2024-06-12 ENCOUNTER — RA CDI HCC (OUTPATIENT)
Dept: OTHER | Facility: HOSPITAL | Age: 69
End: 2024-06-12

## 2024-06-17 RX ORDER — KRILL/OM-3/DHA/EPA/PHOSPHO/AST 500MG-86MG
500 CAPSULE ORAL DAILY
COMMUNITY
Start: 2024-05-17

## 2024-06-17 RX ORDER — CYANOCOBALAMIN (VITAMIN B-12) 1000 MCG
1000 TABLET ORAL DAILY
COMMUNITY
Start: 2024-05-10

## 2024-06-19 ENCOUNTER — OFFICE VISIT (OUTPATIENT)
Dept: FAMILY MEDICINE CLINIC | Facility: CLINIC | Age: 69
End: 2024-06-19
Payer: COMMERCIAL

## 2024-06-19 VITALS
SYSTOLIC BLOOD PRESSURE: 122 MMHG | HEART RATE: 90 BPM | HEIGHT: 62 IN | WEIGHT: 122 LBS | OXYGEN SATURATION: 99 % | DIASTOLIC BLOOD PRESSURE: 80 MMHG | TEMPERATURE: 98.2 F | BODY MASS INDEX: 22.45 KG/M2

## 2024-06-19 DIAGNOSIS — Z00.00 MEDICARE ANNUAL WELLNESS VISIT, SUBSEQUENT: Primary | ICD-10-CM

## 2024-06-19 PROCEDURE — G0439 PPPS, SUBSEQ VISIT: HCPCS | Performed by: FAMILY MEDICINE

## 2024-06-19 NOTE — PROGRESS NOTES
Ambulatory Visit  Name: Zuleyka Douglas      : 1955      MRN: [id number]  Encounter Provider: Ирина Lawson DO  Encounter Date: 2024   Encounter department: [facility]    Assessment & Plan   1. Medicare annual wellness visit, subsequent       Preventive health issues were discussed with patient, and age appropriate screening tests were ordered as noted in patient's After Visit Summary. Personalized health advice and appropriate referrals for health education or preventive services given if needed, as noted in patient's After Visit Summary.    History of Present Illness     HPI   Patient Care Team:  Ирина Lawson DO as PCP - General (Family Medicine)  BROOK Mi DO Gbolabo Sokunbi, MD Sheila Borick, MD Rachel Sverchek, PA-C as Physician Assistant (Physician Assistant)    Review of Systems   Constitutional: Negative.    Respiratory: Negative.     Cardiovascular: Negative.    Gastrointestinal: Negative.    Neurological: Negative.    Hematological: Negative.      Medical History Reviewed by provider this encounter:  Tobacco  Allergies  Meds  Problems  Med Hx  Surg Hx  Fam Hx       Annual Wellness Visit Questionnaire   Zuleyka is here for her Subsequent Wellness visit. Last Medicare Wellness visit information reviewed, patient interviewed and updates made to the record today.      Health Risk Assessment:   Patient rates overall health as very good. Patient feels that their physical health rating is same. Patient is satisfied with their life. Eyesight was rated as same. Hearing was rated as same. Patient feels that their emotional and mental health rating is same. Patients states they are never, rarely angry. Patient states they are sometimes unusually tired/fatigued. Pain experienced in the last 7 days has been some. Patient's pain rating has been 6/10. Patient states that she has experienced no weight loss or gain in last 6 months.      Depression Screening:   PHQ-2 Score: 0      Fall Risk Screening:   In the past year, patient has experienced: history of falling in past year    Number of falls: 1  Injured during fall?: Yes    Feels unsteady when standing or walking?: No    Worried about falling?: No      Urinary Incontinence Screening:   Patient has not leaked urine accidently in the last six months.     Home Safety:  Patient does not have trouble with stairs inside or outside of their home. Patient has working smoke alarms and has working carbon monoxide detector. Home safety hazards include: none.     Nutrition:   Current diet is Regular.     Medications:   Patient is currently taking over-the-counter supplements. OTC medications include: see medication list. Patient is able to manage medications.     Activities of Daily Living (ADLs)/Instrumental Activities of Daily Living (IADLs):   Walk and transfer into and out of bed and chair?: Yes  Dress and groom yourself?: Yes    Bathe or shower yourself?: Yes    Feed yourself? Yes  Do your laundry/housekeeping?: Yes  Manage your money, pay your bills and track your expenses?: Yes  Make your own meals?: Yes    Do your own shopping?: Yes    Previous Hospitalizations:   Any hospitalizations or ED visits within the last 12 months?: Yes    How many hospitalizations have you had in the last year?: 1-2    Advance Care Planning:   Living will: No    Durable POA for healthcare: No    Advanced directive: No    ACP document given: Yes      Cognitive Screening:   Provider or family/friend/caregiver concerned regarding cognition?: No    PREVENTIVE SCREENINGS      Cardiovascular Screening:    General: Screening Not Indicated and History Lipid Disorder      Diabetes Screening:     General: Screening Current      Colorectal Cancer Screening:     General: Screening Current      Breast Cancer Screening:     General: Screening Current      Cervical Cancer Screening:    General: Screening Not Indicated       Osteoporosis Screening:    General: Screening Not Indicated and History Osteoporosis      Abdominal Aortic Aneurysm (AAA) Screening:        General: Screening Current      Lung Cancer Screening:     General: Screening Not Indicated      Hepatitis C Screening:    General: Screening Current    Screening, Brief Intervention, and Referral to Treatment (SBIRT)    Screening  Typical number of drinks in a day: 0  Typical number of drinks in a week: 0  Interpretation: Low risk drinking behavior.    AUDIT-C Screenin) How often did you have a drink containing alcohol in the past year? monthly or less  2) How many drinks did you have on a typical day when you were drinking in the past year? 0  3) How often did you have 6 or more drinks on one occasion in the past year? never    AUDIT-C Score: 1  Interpretation: Score 0-2 (female): Negative screen for alcohol misuse    Single Item Drug Screening:  How often have you used an illegal drug (including marijuana) or a prescription medication for non-medical reasons in the past year? never    Single Item Drug Screen Score: 0  Interpretation: Negative screen for possible drug use disorder    Social Determinants of Health     Financial Resource Strain: Low Risk  (2023)    Overall Financial Resource Strain (CARDIA)     Difficulty of Paying Living Expenses: Not hard at all   Food Insecurity: No Food Insecurity (2024)    Hunger Vital Sign     Worried About Running Out of Food in the Last Year: Never true     Ran Out of Food in the Last Year: Never true   Transportation Needs: No Transportation Needs (2024)    PRAPARE - Transportation     Lack of Transportation (Medical): No     Lack of Transportation (Non-Medical): No   Housing Stability: Low Risk  (2024)    Housing Stability Vital Sign     Unable to Pay for Housing in the Last Year: No     Number of Times Moved in the Last Year: 0     Homeless in the Last Year: No    Received from [employer] For     No  "results found.    Objective     /80 (BP Location: Left arm, Patient Position: Sitting, Cuff Size: Adult)   Pulse 90   Temp 98.2 °F (36.8 °C)   Ht 5' 2\" (1.575 m)   Wt 55.3 kg (122 lb)   SpO2 99%   BMI 22.31 kg/m²     Physical Exam  Vitals and nursing note reviewed.   Constitutional:       General: She is not in acute distress.     Appearance: Normal appearance. She is well-developed and well-groomed. She is not ill-appearing, toxic-appearing or diaphoretic.   HENT:      Head: Normocephalic and atraumatic.      Right Ear: Tympanic membrane and ear canal normal.      Left Ear: Tympanic membrane and ear canal normal.      Nose: Nose normal.      Mouth/Throat:      Lips: Pink.      Mouth: Mucous membranes are moist.      Pharynx: Oropharynx is clear. Uvula midline.   Eyes:      General: Lids are normal.      Conjunctiva/sclera: Conjunctivae normal.      Pupils: Pupils are equal, round, and reactive to light.   Neck:      Thyroid: No thyroid mass, thyromegaly or thyroid tenderness.      Vascular: No JVD.      Trachea: Trachea and phonation normal.   Cardiovascular:      Rate and Rhythm: Normal rate and regular rhythm.      Pulses: Normal pulses.      Heart sounds: Normal heart sounds.   Pulmonary:      Effort: Pulmonary effort is normal.      Breath sounds: Normal breath sounds and air entry.   Abdominal:      General: Bowel sounds are normal.      Palpations: Abdomen is soft. There is no hepatomegaly, splenomegaly or mass.      Tenderness: There is no abdominal tenderness.      Hernia: There is no hernia in the ventral area.   Musculoskeletal:      Cervical back: Neck supple.      Right lower leg: No edema.      Left lower leg: No edema.   Lymphadenopathy:      Cervical: No cervical adenopathy.   Skin:     General: Skin is warm and dry.      Coloration: Skin is not pale.   Neurological:      Mental Status: She is alert and oriented to person, place, and time.      Gait: Gait normal.   Psychiatric:         " Mood and Affect: Mood normal.         Behavior: Behavior normal. Behavior is cooperative.         Cognition and Memory: Cognition and memory normal.       Administrative Statements           Falls Plan of Care: Balance, strength, and gait training instructions were provided.

## 2024-06-25 ENCOUNTER — TELEPHONE (OUTPATIENT)
Age: 69
End: 2024-06-25

## 2024-06-25 DIAGNOSIS — E53.8 B12 DEFICIENCY: ICD-10-CM

## 2024-06-25 DIAGNOSIS — K21.9 GASTROESOPHAGEAL REFLUX DISEASE, UNSPECIFIED WHETHER ESOPHAGITIS PRESENT: Primary | ICD-10-CM

## 2024-06-25 RX ORDER — OMEPRAZOLE 40 MG/1
40 CAPSULE, DELAYED RELEASE ORAL DAILY
Qty: 90 CAPSULE | Refills: 1 | Status: SHIPPED | OUTPATIENT
Start: 2024-06-25

## 2024-06-25 NOTE — TELEPHONE ENCOUNTER
Patients GI provider:  YUNG Dominguez    Number to return call: [phone]    Reason for call: Pt calling stating RX for Omeprazole was put in incorrectly. Please send rx for 40mg tabs for 90 days.    Also, order needs to be placed for her repeat B12 test.    Scheduled procedure/appointment date if applicable: 9/17/24

## 2024-08-26 ENCOUNTER — APPOINTMENT (OUTPATIENT)
Dept: LAB | Facility: CLINIC | Age: 69
End: 2024-08-26
Payer: COMMERCIAL

## 2024-08-26 DIAGNOSIS — E53.8 B12 DEFICIENCY: ICD-10-CM

## 2024-08-26 LAB — VIT B12 SERPL-MCNC: 1633 PG/ML (ref 180–914)

## 2024-08-26 PROCEDURE — 82607 VITAMIN B-12: CPT

## 2024-08-26 PROCEDURE — 36415 COLL VENOUS BLD VENIPUNCTURE: CPT

## 2024-09-17 ENCOUNTER — OFFICE VISIT (OUTPATIENT)
Dept: GASTROENTEROLOGY | Facility: CLINIC | Age: 69
End: 2024-09-17
Payer: COMMERCIAL

## 2024-09-17 VITALS
RESPIRATION RATE: 18 BRPM | HEART RATE: 73 BPM | BODY MASS INDEX: 22.82 KG/M2 | SYSTOLIC BLOOD PRESSURE: 126 MMHG | HEIGHT: 62 IN | DIASTOLIC BLOOD PRESSURE: 74 MMHG | OXYGEN SATURATION: 98 % | WEIGHT: 124 LBS

## 2024-09-17 DIAGNOSIS — K31.7 GASTRIC POLYP: ICD-10-CM

## 2024-09-17 DIAGNOSIS — Z12.11 SCREENING FOR COLON CANCER: ICD-10-CM

## 2024-09-17 DIAGNOSIS — K21.9 GASTROESOPHAGEAL REFLUX DISEASE WITHOUT ESOPHAGITIS: Primary | ICD-10-CM

## 2024-09-17 DIAGNOSIS — K59.00 CONSTIPATION, UNSPECIFIED CONSTIPATION TYPE: ICD-10-CM

## 2024-09-17 DIAGNOSIS — K22.2 LOWER ESOPHAGEAL RING (SCHATZKI): ICD-10-CM

## 2024-09-17 DIAGNOSIS — E53.8 B12 DEFICIENCY: ICD-10-CM

## 2024-09-17 PROCEDURE — 99214 OFFICE O/P EST MOD 30 MIN: CPT | Performed by: PHYSICIAN ASSISTANT

## 2024-09-17 NOTE — PATIENT INSTRUCTIONS
For now, please continue on the omeprazole at 40 mg every morning.  If you start to develop any issues with swallowing, we may need to repeat the upper endoscopy and stretch the area of your esophagus because you have a little benign ring or growth of tissue at the bottom of your food pipe that sometimes causes some issues with swallowing.  If in the future you are interested in trying to taper the omeprazole or adjust your regimen please let me know.  Repeat your B12 in a couple of months.    For your bowels, I would recommend a fiber supplement and excellent hydration.  I would recommend MiraLAX daily or at least on a scheduled routine basis.  You can take anywhere from one half capful daily up to 2 capfuls daily, all of which is a safe daily dose to take.  You mix 1 dose of MiraLAX or 17 g or 1 capful in 8 ounces of liquid.  It is okay to use Senokot or Dulcolax as needed for persistent constipation.  If your bowels do not start moving better, we can always try something more potent such as a prescription pooping pill like Linzess.
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
## 2024-09-20 DIAGNOSIS — F51.01 PRIMARY INSOMNIA: ICD-10-CM

## 2024-09-22 RX ORDER — TRAZODONE HYDROCHLORIDE 50 MG/1
50 TABLET, FILM COATED ORAL
Qty: 90 TABLET | Refills: 1 | Status: SHIPPED | OUTPATIENT
Start: 2024-09-22

## 2024-12-02 DIAGNOSIS — K21.9 GASTROESOPHAGEAL REFLUX DISEASE, UNSPECIFIED WHETHER ESOPHAGITIS PRESENT: ICD-10-CM

## 2024-12-03 RX ORDER — OMEPRAZOLE 40 MG/1
40 CAPSULE, DELAYED RELEASE ORAL DAILY
Qty: 90 CAPSULE | Refills: 1 | Status: SHIPPED | OUTPATIENT
Start: 2024-12-03

## 2024-12-15 ENCOUNTER — RA CDI HCC (OUTPATIENT)
Dept: OTHER | Facility: HOSPITAL | Age: 69
End: 2024-12-15

## 2024-12-19 ENCOUNTER — OFFICE VISIT (OUTPATIENT)
Dept: FAMILY MEDICINE CLINIC | Facility: CLINIC | Age: 69
End: 2024-12-19

## 2024-12-19 VITALS
HEIGHT: 62 IN | DIASTOLIC BLOOD PRESSURE: 70 MMHG | WEIGHT: 125 LBS | OXYGEN SATURATION: 98 % | SYSTOLIC BLOOD PRESSURE: 130 MMHG | TEMPERATURE: 97.8 F | BODY MASS INDEX: 23 KG/M2 | HEART RATE: 90 BPM

## 2024-12-19 DIAGNOSIS — Z01.419 ENCOUNTER FOR GYNECOLOGICAL EXAMINATION WITHOUT ABNORMAL FINDING: Primary | ICD-10-CM

## 2024-12-19 DIAGNOSIS — Z12.31 BREAST CANCER SCREENING BY MAMMOGRAM: ICD-10-CM

## 2024-12-19 DIAGNOSIS — Z23 ENCOUNTER FOR IMMUNIZATION: ICD-10-CM

## 2024-12-19 NOTE — PROGRESS NOTES
"Subjective   Chief Complaint   Patient presents with    Gynecologic Exam        Zuleyka Douglas is a 69 y.o. female who presents for annual exam. The patient has no complaints today. The patient is sexually active. GYN screening history: last pap: was normal and 2020 and last mammogram: was normal. The patient is not taking hormone replacement therapy. Patient denies post-menopausal vaginal bleeding.. The patient wears seatbelts: yes.    Menstrual History:  OB History          2    Para   2    Term   2            AB        Living             SAB        IAB        Ectopic        Multiple        Live Births   2           Obstetric Comments   Menarche: 12 years old  First pregnancy: 27 years old                No LMP recorded. Patient is postmenopausal.       The following portions of the patient's history were reviewed and updated as appropriate: allergies, current medications, past family history, past medical history, past social history, past surgical history, and problem list.    Review of Systems  Pertinent items are noted in HPI.     Objective      /70 (BP Location: Left arm, Patient Position: Sitting, Cuff Size: Standard)   Pulse 90   Temp 97.8 °F (36.6 °C) (Tympanic)   Ht 5' 2\" (1.575 m)   Wt 56.7 kg (125 lb)   SpO2 98%   BMI 22.86 kg/m²     General:   alert and oriented, in no acute distress   Heart: regular rate and rhythm, S1, S2 normal, no murmur, click, rub or gallop   Lungs: clear to auscultation bilaterally   Abdomen: soft, non-tender, without masses or organomegaly   Vulva: normal   Vagina: dry mucosa       Uterus: normal size, non-tender   Adnexa: normal adnexa        Assessment/Plan     Diagnoses and all orders for this visit:    Encounter for gynecological examination without abnormal finding    Encounter for immunization  -     Pneumococcal Conjugate Vaccine 20-valent (Pcv20)    Breast cancer screening by mammogram  -     Mammo screening bilateral w 3d and cad; " Future

## 2025-01-06 ENCOUNTER — APPOINTMENT (OUTPATIENT)
Dept: LAB | Facility: CLINIC | Age: 70
End: 2025-01-06
Payer: COMMERCIAL

## 2025-01-06 DIAGNOSIS — E53.8 B12 DEFICIENCY: ICD-10-CM

## 2025-01-06 LAB — VIT B12 SERPL-MCNC: 634 PG/ML (ref 180–914)

## 2025-01-06 PROCEDURE — 36415 COLL VENOUS BLD VENIPUNCTURE: CPT

## 2025-01-06 PROCEDURE — 82607 VITAMIN B-12: CPT

## 2025-01-07 ENCOUNTER — RESULTS FOLLOW-UP (OUTPATIENT)
Dept: GASTROENTEROLOGY | Facility: CLINIC | Age: 70
End: 2025-01-07

## 2025-01-08 ENCOUNTER — OFFICE VISIT (OUTPATIENT)
Dept: RHEUMATOLOGY | Facility: CLINIC | Age: 70
End: 2025-01-08

## 2025-01-08 VITALS
BODY MASS INDEX: 23.48 KG/M2 | HEART RATE: 70 BPM | WEIGHT: 127.6 LBS | HEIGHT: 62 IN | OXYGEN SATURATION: 98 % | SYSTOLIC BLOOD PRESSURE: 132 MMHG | TEMPERATURE: 98 F | DIASTOLIC BLOOD PRESSURE: 86 MMHG

## 2025-01-08 DIAGNOSIS — Z79.899 ENCOUNTER FOR MONITORING DENOSUMAB THERAPY: ICD-10-CM

## 2025-01-08 DIAGNOSIS — Z51.81 ENCOUNTER FOR MONITORING DENOSUMAB THERAPY: ICD-10-CM

## 2025-01-08 DIAGNOSIS — M15.0 PRIMARY GENERALIZED (OSTEO)ARTHRITIS: ICD-10-CM

## 2025-01-08 DIAGNOSIS — M81.0 AGE-RELATED OSTEOPOROSIS WITHOUT CURRENT PATHOLOGICAL FRACTURE: Primary | ICD-10-CM

## 2025-01-08 NOTE — ASSESSMENT & PLAN NOTE
Ms. Douglas is a 69-year-old female with history significant for postmenopausal osteoporosis who presents for a follow-up.  She is currently receiving subcutaneous denosumab injections 60 mg every 6 months.        - Zuleyka presents today for a follow-up of postmenopausal osteoporosis and to receive her scheduled denosumab injection.  Since the last visit she denies interim fractures.  She will continue taking weekly calcium supplements due to a history of hypercalcemia and vitamin-D supplements 2000 IU once daily and perform weight-bearing exercises.  I requested she update a BMP.  A DEXA scan can be updated in 9/2025.
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
## 2025-01-08 NOTE — PROGRESS NOTES
"Assessment/Plan:    Zuleyka Douglas came into the [facility] Rheumatology Office today 01/08/25 to receive Prolia injection.      Verbal consent obtained.  Consent given by: patient    patient states patient has been medically healthy with no underlining concerns/complications.      Zuleyka Douglas presents with no symptoms today.       All insturctions were reviewed with the patient.    If the patient should have any questions/concerns, advised patient to contacted [facility] Rheumatology Office.       Subjective:     History provided by: patient    Patient ID: Zuleyka Douglas is a 69 y.o. female      Objective:    Vitals:    01/08/25 0907   BP: 132/86   BP Location: Left arm   Pulse: 70   Temp: 98 °F (36.7 °C)   SpO2: 98%   Weight: 57.9 kg (127 lb 9.6 oz)   Height: 5' 2\" (1.575 m)       Patient tolerated the injection well without any complications.  Injection site/s Left arm.  Medication was provided by Rheumatology.    Patient signed consent form yes   Patient signed ABN form yes (If no patient is not a medicare patient).   Patient waited 15 minutes after injection no (This only applies to patient's receiving first time injection).       Last Visit: Visit date not found  Next visit:Visit date not found     Answers submitted by the patient for this visit:  Back Pain Questionnaire (Submitted on 1/1/2025)  Chief Complaint: Back pain  Chronicity: recurrent  Onset: more than 1 year ago  Frequency: intermittently  Progression since onset: unchanged  Pain location: sacro-iliac  Pain quality: aching  Radiates to: right thigh  Pain - numeric: 8/10  Pain is: the same all the time  Aggravated by: lying down  Stiffness is present: all day  abdominal pain: No  bladder incontinence: No  bowel incontinence: No  chest pain: No  fever: No  headaches: No  leg pain: Yes  numbness: No  perianal numbness: No  dysuria: No  paresis: No  pelvic pain: Yes  paresthesias: No  tingling: No  weakness: No  weight loss: No  Risk factors: history " of osteoporosis, menopause, pregnancy

## 2025-03-04 ENCOUNTER — HOSPITAL ENCOUNTER (OUTPATIENT)
Dept: RADIOLOGY | Facility: IMAGING CENTER | Age: 70
Discharge: HOME | End: 2025-03-04
Payer: COMMERCIAL

## 2025-03-04 DIAGNOSIS — N20.0 NEPHROLITHIASIS: ICD-10-CM

## 2025-03-04 PROCEDURE — 76775 US EXAM ABDO BACK WALL LIM: CPT

## 2025-03-07 ENCOUNTER — RESULTS FOLLOW-UP (OUTPATIENT)
Dept: OTHER | Facility: HOSPITAL | Age: 70
End: 2025-03-07

## 2025-03-07 NOTE — TELEPHONE ENCOUNTER
Updated ultrasound kidney and bladder (3/4/25) reviewed:    No renal or bladder masses/lesions  Nonobstructing right 3 mm stone  Nonobstructing left 2 mm stone  Simple renal cyst noted on the left  Bladder is normally distended, bilateral ureteral jets noted  No renal swelling or signs of obstruction    The patient has not been seen in office since March 2023 -- it is her decision if she would like to follow-up with us in office or if she would like to only reach out in the future if she has new onset symptoms as her stones have remained stable and do not require intervention.

## 2025-03-14 ENCOUNTER — TELEPHONE (OUTPATIENT)
Age: 70
End: 2025-03-14

## 2025-03-14 NOTE — TELEPHONE ENCOUNTER
Patient called asking if Dr Lawson could please order her yearly blood work for her.  She uses [facility].

## 2025-03-21 NOTE — TELEPHONE ENCOUNTER
Patient called the office asking if the provider could write orders for her to get labs done. Please review and advise.

## 2025-04-03 DIAGNOSIS — Z79.899 LONG-TERM USE OF HIGH-RISK MEDICATION: ICD-10-CM

## 2025-04-03 DIAGNOSIS — Z13.29 THYROID DISORDER SCREEN: ICD-10-CM

## 2025-04-03 DIAGNOSIS — Z13.21 ENCOUNTER FOR VITAMIN DEFICIENCY SCREENING: ICD-10-CM

## 2025-04-03 DIAGNOSIS — Z13.0 SCREENING FOR DEFICIENCY ANEMIA: ICD-10-CM

## 2025-04-03 DIAGNOSIS — Z13.1 ENCOUNTER FOR SCREENING EXAMINATION FOR IMPAIRED GLUCOSE REGULATION AND DIABETES MELLITUS: ICD-10-CM

## 2025-04-03 DIAGNOSIS — E78.5 HYPERLIPIDEMIA, UNSPECIFIED HYPERLIPIDEMIA TYPE: Primary | ICD-10-CM

## 2025-04-03 DIAGNOSIS — M81.8 OTHER OSTEOPOROSIS WITHOUT CURRENT PATHOLOGICAL FRACTURE: ICD-10-CM

## 2025-04-14 DIAGNOSIS — F51.01 PRIMARY INSOMNIA: ICD-10-CM

## 2025-04-16 RX ORDER — TRAZODONE HYDROCHLORIDE 50 MG/1
50 TABLET ORAL
Qty: 90 TABLET | Refills: 1 | Status: SHIPPED | OUTPATIENT
Start: 2025-04-16

## 2025-04-23 ENCOUNTER — HOSPITAL ENCOUNTER (OUTPATIENT)
Dept: RADIOLOGY | Facility: IMAGING CENTER | Age: 70
Discharge: HOME | End: 2025-04-23
Payer: COMMERCIAL

## 2025-04-23 VITALS — WEIGHT: 120 LBS | BODY MASS INDEX: 22.08 KG/M2 | HEIGHT: 62 IN

## 2025-04-23 DIAGNOSIS — Z12.31 BREAST CANCER SCREENING BY MAMMOGRAM: ICD-10-CM

## 2025-04-23 PROCEDURE — 77063 BREAST TOMOSYNTHESIS BI: CPT

## 2025-04-23 PROCEDURE — 77067 SCR MAMMO BI INCL CAD: CPT

## 2025-04-27 ENCOUNTER — PATIENT MESSAGE (OUTPATIENT)
Dept: GASTROENTEROLOGY | Facility: CLINIC | Age: 70
End: 2025-04-27

## 2025-04-27 DIAGNOSIS — E53.8 B12 DEFICIENCY: Primary | ICD-10-CM

## 2025-05-22 DIAGNOSIS — K21.9 GASTROESOPHAGEAL REFLUX DISEASE, UNSPECIFIED WHETHER ESOPHAGITIS PRESENT: ICD-10-CM

## 2025-05-22 RX ORDER — OMEPRAZOLE 40 MG/1
40 CAPSULE, DELAYED RELEASE ORAL DAILY
Qty: 90 CAPSULE | Refills: 1 | Status: SHIPPED | OUTPATIENT
Start: 2025-05-22

## 2025-05-30 ENCOUNTER — APPOINTMENT (OUTPATIENT)
Dept: LAB | Facility: CLINIC | Age: 70
End: 2025-05-30
Payer: COMMERCIAL

## 2025-05-30 DIAGNOSIS — E78.5 HYPERLIPIDEMIA, UNSPECIFIED HYPERLIPIDEMIA TYPE: ICD-10-CM

## 2025-05-30 DIAGNOSIS — M81.8 OTHER OSTEOPOROSIS WITHOUT CURRENT PATHOLOGICAL FRACTURE: ICD-10-CM

## 2025-05-30 DIAGNOSIS — Z79.620 ENCOUNTER FOR MONITORING DENOSUMAB THERAPY: ICD-10-CM

## 2025-05-30 DIAGNOSIS — Z13.1 ENCOUNTER FOR SCREENING EXAMINATION FOR IMPAIRED GLUCOSE REGULATION AND DIABETES MELLITUS: ICD-10-CM

## 2025-05-30 DIAGNOSIS — Z51.81 ENCOUNTER FOR MONITORING DENOSUMAB THERAPY: ICD-10-CM

## 2025-05-30 DIAGNOSIS — E53.8 B12 DEFICIENCY: ICD-10-CM

## 2025-05-30 DIAGNOSIS — Z13.29 THYROID DISORDER SCREEN: ICD-10-CM

## 2025-05-30 DIAGNOSIS — Z79.899 LONG-TERM USE OF HIGH-RISK MEDICATION: ICD-10-CM

## 2025-05-30 DIAGNOSIS — Z13.21 ENCOUNTER FOR VITAMIN DEFICIENCY SCREENING: ICD-10-CM

## 2025-05-30 DIAGNOSIS — Z13.0 SCREENING FOR DEFICIENCY ANEMIA: ICD-10-CM

## 2025-05-30 LAB
25(OH)D3 SERPL-MCNC: 70.2 NG/ML (ref 30–100)
ALBUMIN SERPL BCG-MCNC: 4.3 G/DL (ref 3.5–5)
ALP SERPL-CCNC: 44 U/L (ref 34–104)
ALT SERPL W P-5'-P-CCNC: 8 U/L (ref 7–52)
ANION GAP SERPL CALCULATED.3IONS-SCNC: 5 MMOL/L (ref 4–13)
AST SERPL W P-5'-P-CCNC: 12 U/L (ref 13–39)
BASOPHILS # BLD AUTO: 0.07 THOUSANDS/ÂΜL (ref 0–0.1)
BASOPHILS NFR BLD AUTO: 1 % (ref 0–1)
BILIRUB SERPL-MCNC: 0.56 MG/DL (ref 0.2–1)
BUN SERPL-MCNC: 12 MG/DL (ref 5–25)
CALCIUM SERPL-MCNC: 9.5 MG/DL (ref 8.4–10.2)
CHLORIDE SERPL-SCNC: 107 MMOL/L (ref 96–108)
CHOLEST SERPL-MCNC: 207 MG/DL (ref ?–200)
CO2 SERPL-SCNC: 31 MMOL/L (ref 21–32)
CREAT SERPL-MCNC: 0.82 MG/DL (ref 0.6–1.3)
EOSINOPHIL # BLD AUTO: 0.25 THOUSAND/ÂΜL (ref 0–0.61)
EOSINOPHIL NFR BLD AUTO: 4 % (ref 0–6)
ERYTHROCYTE [DISTWIDTH] IN BLOOD BY AUTOMATED COUNT: 11.9 % (ref 11.6–15.1)
GFR SERPL CREATININE-BSD FRML MDRD: 72 ML/MIN/1.73SQ M
GLUCOSE P FAST SERPL-MCNC: 89 MG/DL (ref 65–99)
HCT VFR BLD AUTO: 42.6 % (ref 34.8–46.1)
HDLC SERPL-MCNC: 58 MG/DL
HGB BLD-MCNC: 13.9 G/DL (ref 11.5–15.4)
IMM GRANULOCYTES # BLD AUTO: 0.01 THOUSAND/UL (ref 0–0.2)
IMM GRANULOCYTES NFR BLD AUTO: 0 % (ref 0–2)
LDLC SERPL CALC-MCNC: 129 MG/DL (ref 0–100)
LYMPHOCYTES # BLD AUTO: 2.48 THOUSANDS/ÂΜL (ref 0.6–4.47)
LYMPHOCYTES NFR BLD AUTO: 37 % (ref 14–44)
MAGNESIUM SERPL-MCNC: 2.1 MG/DL (ref 1.9–2.7)
MCH RBC QN AUTO: 30.1 PG (ref 26.8–34.3)
MCHC RBC AUTO-ENTMCNC: 32.6 G/DL (ref 31.4–37.4)
MCV RBC AUTO: 92 FL (ref 82–98)
MONOCYTES # BLD AUTO: 0.51 THOUSAND/ÂΜL (ref 0.17–1.22)
MONOCYTES NFR BLD AUTO: 8 % (ref 4–12)
NEUTROPHILS # BLD AUTO: 3.39 THOUSANDS/ÂΜL (ref 1.85–7.62)
NEUTS SEG NFR BLD AUTO: 50 % (ref 43–75)
NRBC BLD AUTO-RTO: 0 /100 WBCS
PLATELET # BLD AUTO: 415 THOUSANDS/UL (ref 149–390)
PMV BLD AUTO: 9.8 FL (ref 8.9–12.7)
POTASSIUM SERPL-SCNC: 4 MMOL/L (ref 3.5–5.3)
PROT SERPL-MCNC: 7 G/DL (ref 6.4–8.4)
RBC # BLD AUTO: 4.62 MILLION/UL (ref 3.81–5.12)
SODIUM SERPL-SCNC: 143 MMOL/L (ref 135–147)
TRIGL SERPL-MCNC: 101 MG/DL (ref ?–150)
TSH SERPL DL<=0.05 MIU/L-ACNC: 1.9 UIU/ML (ref 0.45–4.5)
VIT B12 SERPL-MCNC: 523 PG/ML (ref 180–914)
WBC # BLD AUTO: 6.71 THOUSAND/UL (ref 4.31–10.16)

## 2025-05-30 PROCEDURE — 85025 COMPLETE CBC W/AUTO DIFF WBC: CPT

## 2025-05-30 PROCEDURE — 80061 LIPID PANEL: CPT

## 2025-05-30 PROCEDURE — 83735 ASSAY OF MAGNESIUM: CPT

## 2025-05-30 PROCEDURE — 80053 COMPREHEN METABOLIC PANEL: CPT

## 2025-05-30 PROCEDURE — 84443 ASSAY THYROID STIM HORMONE: CPT

## 2025-05-30 PROCEDURE — 82306 VITAMIN D 25 HYDROXY: CPT

## 2025-05-30 PROCEDURE — 82607 VITAMIN B-12: CPT

## 2025-05-30 PROCEDURE — 36415 COLL VENOUS BLD VENIPUNCTURE: CPT

## 2025-06-02 ENCOUNTER — RESULTS FOLLOW-UP (OUTPATIENT)
Dept: GASTROENTEROLOGY | Facility: CLINIC | Age: 70
End: 2025-06-02

## 2025-06-26 ENCOUNTER — RA CDI HCC (OUTPATIENT)
Dept: OTHER | Facility: HOSPITAL | Age: 70
End: 2025-06-26

## 2025-07-01 ENCOUNTER — OFFICE VISIT (OUTPATIENT)
Dept: FAMILY MEDICINE CLINIC | Facility: CLINIC | Age: 70
End: 2025-07-01
Payer: COMMERCIAL

## 2025-07-01 VITALS
TEMPERATURE: 99 F | SYSTOLIC BLOOD PRESSURE: 126 MMHG | HEART RATE: 85 BPM | DIASTOLIC BLOOD PRESSURE: 78 MMHG | OXYGEN SATURATION: 100 % | BODY MASS INDEX: 22.82 KG/M2 | HEIGHT: 62 IN | WEIGHT: 124 LBS

## 2025-07-01 DIAGNOSIS — Z97.4 HEARING AID WORN: ICD-10-CM

## 2025-07-01 DIAGNOSIS — R68.84 JAW PAIN: ICD-10-CM

## 2025-07-01 DIAGNOSIS — Z00.00 MEDICARE ANNUAL WELLNESS VISIT, SUBSEQUENT: Primary | ICD-10-CM

## 2025-07-01 DIAGNOSIS — F51.01 PRIMARY INSOMNIA: ICD-10-CM

## 2025-07-01 PROCEDURE — 99214 OFFICE O/P EST MOD 30 MIN: CPT | Performed by: FAMILY MEDICINE

## 2025-07-01 PROCEDURE — G0439 PPPS, SUBSEQ VISIT: HCPCS | Performed by: FAMILY MEDICINE

## 2025-07-01 PROCEDURE — G2211 COMPLEX E/M VISIT ADD ON: HCPCS | Performed by: FAMILY MEDICINE

## 2025-07-01 RX ORDER — TRAZODONE HYDROCHLORIDE 50 MG/1
50 TABLET ORAL
Qty: 90 TABLET | Refills: 1 | Status: SHIPPED | OUTPATIENT
Start: 2025-07-01

## 2025-07-01 NOTE — PATIENT INSTRUCTIONS
Medicare Preventive Visit Patient Instructions  Thank you for completing your Welcome to Medicare Visit or Medicare Annual Wellness Visit today. Your next wellness visit will be due in one year (7/2/2026).  The screening/preventive services that you may require over the next 5-10 years are detailed below. Some tests may not apply to you based off risk factors and/or age. Screening tests ordered at today's visit but not completed yet may show as past due. Also, please note that scanned in results may not display below.  Preventive Screenings:  Service Recommendations Previous Testing/Comments   Colorectal Cancer Screening  * Colonoscopy    * Fecal Occult Blood Test (FOBT)/Fecal Immunochemical Test (FIT)  * Fecal DNA/Cologuard Test  * Flexible Sigmoidoscopy Age: 45-75 years old   Colonoscopy: every 10 years (may be performed more frequently if at higher risk)  OR  FOBT/FIT: every 1 year  OR  Cologuard: every 3 years  OR  Sigmoidoscopy: every 5 years  Screening may be recommended earlier than age 45 if at higher risk for colorectal cancer. Also, an individualized decision between you and your healthcare provider will decide whether screening between the ages of 76-85 would be appropriate. Colonoscopy: 05/22/2024  FOBT/FIT: Not on file  Cologuard: Not on file  Sigmoidoscopy: Not on file    Screening Current     Breast Cancer Screening Age: 40+ years old  Frequency: every 1-2 years  Not required if history of left and right mastectomy Mammogram: 04/23/2025    Screening Current   Cervical Cancer Screening Between the ages of 21-29, pap smear recommended once every 3 years.   Between the ages of 30-65, can perform pap smear with HPV co-testing every 5 years.   Recommendations may differ for women with a history of total hysterectomy, cervical cancer, or abnormal pap smears in past. Pap Smear: 12/14/2023    Screening Not Indicated   Hepatitis C Screening Once for adults born between 1945 and 1965  More frequently in  patients at high risk for Hepatitis C Hep C Antibody: 05/26/2021    Screening Current   Diabetes Screening 1-2 times per year if you're at risk for diabetes or have pre-diabetes Fasting glucose: 89 mg/dL (5/30/2025)  A1C: No results in last 5 years (No results in last 5 years)  Screening Current   Cholesterol Screening Once every 5 years if you don't have a lipid disorder. May order more often based on risk factors. Lipid panel: 05/30/2025    Screening Not Indicated  History Lipid Disorder     Other Preventive Screenings Covered by Medicare:  Abdominal Aortic Aneurysm (AAA) Screening: covered once if your at risk. You're considered to be at risk if you have a family history of AAA.  Lung Cancer Screening: covers low dose CT scan once per year if you meet all of the following conditions: (1) Age 55-77; (2) No signs or symptoms of lung cancer; (3) Current smoker or have quit smoking within the last 15 years; (4) You have a tobacco smoking history of at least 20 pack years (packs per day multiplied by number of years you smoked); (5) You get a written order from a healthcare provider.  Glaucoma Screening: covered annually if you're considered high risk: (1) You have diabetes OR (2) Family history of glaucoma OR (3)  aged 50 and older OR (4)  American aged 65 and older  Osteoporosis Screening: covered every 2 years if you meet one of the following conditions: (1) You're estrogen deficient and at risk for osteoporosis based off medical history and other findings; (2) Have a vertebral abnormality; (3) On glucocorticoid therapy for more than 3 months; (4) Have primary hyperparathyroidism; (5) On osteoporosis medications and need to assess response to drug therapy.   Last bone density test (DXA Scan): 09/06/2023.  HIV Screening: covered annually if you're between the age of 15-65. Also covered annually if you are younger than 15 and older than 65 with risk factors for HIV infection. For pregnant  patients, it is covered up to 3 times per pregnancy.    Immunizations:  Immunization Recommendations   Influenza Vaccine Annual influenza vaccination during flu season is recommended for all persons aged >= 6 months who do not have contraindications   Pneumococcal Vaccine   * Pneumococcal conjugate vaccine = PCV13 (Prevnar 13), PCV15 (Vaxneuvance), PCV20 (Prevnar 20)  * Pneumococcal polysaccharide vaccine = PPSV23 (Pneumovax) Adults 19-65 yo with certain risk factors or if 65+ yo  If never received any pneumonia vaccine: recommend Prevnar 20 (PCV20)  Give PCV20 if previously received 1 dose of PCV13 or PPSV23   Hepatitis B Vaccine 3 dose series if at intermediate or high risk (ex: diabetes, end stage renal disease, liver disease)   Respiratory syncytial virus (RSV) Vaccine - COVERED BY MEDICARE PART D  * RSVPreF3 (Arexvy) CDC recommends that adults 60 years of age and older may receive a single dose of RSV vaccine using shared clinical decision-making (SCDM)   Tetanus (Td) Vaccine - COST NOT COVERED BY MEDICARE PART B Following completion of primary series, a booster dose should be given every 10 years to maintain immunity against tetanus. Td may also be given as tetanus wound prophylaxis.   Tdap Vaccine - COST NOT COVERED BY MEDICARE PART B Recommended at least once for all adults. For pregnant patients, recommended with each pregnancy.   Shingles Vaccine (Shingrix) - COST NOT COVERED BY MEDICARE PART B  2 shot series recommended in those 19 years and older who have or will have weakened immune systems or those 50 years and older     Health Maintenance Due:      Topic Date Due   • Breast Cancer Screening: Mammogram  04/23/2027   • DXA SCAN  09/06/2028   • Hepatitis C Screening  Completed   • Colorectal Cancer Screening  Discontinued     Immunizations Due:      Topic Date Due   • COVID-19 Vaccine (5 - 2024-25 season) 09/01/2024   • Influenza Vaccine (1) 09/01/2025     Advance Directives   What are advance  directives?  Advance directives are legal documents that state your wishes and plans for medical care. These plans are made ahead of time in case you lose your ability to make decisions for yourself. Advance directives can apply to any medical decision, such as the treatments you want, and if you want to donate organs.   What are the types of advance directives?  There are many types of advance directives, and each state has rules about how to use them. You may choose a combination of any of the following:  Living will:  This is a written record of the treatment you want. You can also choose which treatments you do not want, which to limit, and which to stop at a certain time. This includes surgery, medicine, IV fluid, and tube feedings.   Durable power of  for healthcare (DPAHC):  This is a written record that states who you want to make healthcare choices for you when you are unable to make them for yourself. This person, called a proxy, is usually a family member or a friend. You may choose more than 1 proxy.  Do not resuscitate (DNR) order:  A DNR order is used in case your heart stops beating or you stop breathing. It is a request not to have certain forms of treatment, such as CPR. A DNR order may be included in other types of advance directives.  Medical directive:  This covers the care that you want if you are in a coma, near death, or unable to make decisions for yourself. You can list the treatments you want for each condition. Treatment may include pain medicine, surgery, blood transfusions, dialysis, IV or tube feedings, and a ventilator (breathing machine).  Values history:  This document has questions about your views, beliefs, and how you feel and think about life. This information can help others choose the care that you would choose.  Why are advance directives important?  An advance directive helps you control your care. Although spoken wishes may be used, it is better to have your wishes  written down. Spoken wishes can be misunderstood, or not followed. Treatments may be given even if you do not want them. An advance directive may make it easier for your family to make difficult choices about your care.       © Copyright [employer] 2018 Information is for End User's use only and may not be sold, redistributed or otherwise used for commercial purposes. All illustrations and images included in CareNotes® are the copyrighted property of [employer].D.A[employer]M., Inc. or "[employer]

## 2025-07-01 NOTE — PROGRESS NOTES
Name: Zuleyka Douglas      : 1955      MRN: [id number]  Encounter Provider: Ирина Lawson DO  Encounter Date: 2025   Encounter department: [facility] AT [facility]  :  Assessment & Plan  Medicare annual wellness visit, subsequent         Primary insomnia  Controlled, cpm  Orders:    traZODone (DESYREL) 50 mg tablet; Take 1 tablet (50 mg total) by mouth daily at bedtime    Hearing aid worn    Orders:    Ambulatory Referral to Otolaryngology; Future    Jaw pain    Orders:    Ambulatory Referral to Otolaryngology; Future       Preventive health issues were discussed with patient, and age appropriate screening tests were ordered as noted in patient's After Visit Summary. Personalized health advice and appropriate referrals for health education or preventive services given if needed, as noted in patient's After Visit Summary.    History of Present Illness     Medicare AWV + f/u visit  Also c/o right jaw pain       Patient Care Team:  Ирина Lawson DO as PCP - General (Family Medicine)  BROOK Mi DO Gbolabo Sokunbi, MD Sheila Borick, MD Rachel Sverchek, PA-C as Physician Assistant (Physician Assistant)    Review of Systems   All other systems reviewed and are negative.    Medical History Reviewed by provider this encounter:  Tobacco  Allergies  Meds  Problems  Med Hx  Surg Hx  Fam Hx       Annual Wellness Visit Questionnaire   Zuleyka is here for her Subsequent Wellness visit. Last Medicare Wellness visit information reviewed, patient interviewed and updates made to the record today.      Health Risk Assessment:   Patient rates overall health as very good. Patient feels that their physical health rating is same. Patient is satisfied with their life. Eyesight was rated as same. Hearing was rated as same. Patient feels that their emotional and mental health rating is same. Patients states they are never, rarely angry. Patient states they are sometimes  unusually tired/fatigued. Pain experienced in the last 7 days has been none. Patient states that she has experienced no weight loss or gain in last 6 months.     Depression Screening:   PHQ-2 Score: 0      Fall Risk Screening:   In the past year, patient has experienced: no history of falling in past year      Urinary Incontinence Screening:   Patient has not leaked urine accidently in the last six months.     Home Safety:  Patient does not have trouble with stairs inside or outside of their home. Patient has working smoke alarms and has working carbon monoxide detector. Home safety hazards include: none.     Nutrition:   Current diet is Regular.     Medications:   Patient is currently taking over-the-counter supplements. OTC medications include: see medication list. Patient is able to manage medications.     Activities of Daily Living (ADLs)/Instrumental Activities of Daily Living (IADLs):   Walk and transfer into and out of bed and chair?: Yes  Dress and groom yourself?: Yes    Bathe or shower yourself?: Yes    Feed yourself? Yes  Do your laundry/housekeeping?: Yes  Manage your money, pay your bills and track your expenses?: Yes  Make your own meals?: Yes    Do your own shopping?: Yes    Previous Hospitalizations:   Any hospitalizations or ED visits within the last 12 months?: No      Advance Care Planning:   Living will: No    Durable POA for healthcare: No    Advanced directive: No    ACP document given: Yes      Cognitive Screening:   Provider or family/friend/caregiver concerned regarding cognition?: No    Preventive Screenings      Cardiovascular Screening:    General: Screening Not Indicated and History Lipid Disorder      Diabetes Screening:     General: Screening Current      Colorectal Cancer Screening:     General: Screening Current      Breast Cancer Screening:     General: Screening Current      Cervical Cancer Screening:    General: Screening Not Indicated      Osteoporosis Screening:    General:  Screening Not Indicated and History Osteoporosis      Abdominal Aortic Aneurysm (AAA) Screening:        General: Screening Current      Lung Cancer Screening:     General: Screening Not Indicated      Hepatitis C Screening:    General: Screening Current    Screening, Brief Intervention, and Referral to Treatment (SBIRT)     Screening  Typical number of drinks in a day: 0  Typical number of drinks in a week: 0  Interpretation: Low risk drinking behavior.    AUDIT-C Screenin) How often did you have a drink containing alcohol in the past year? monthly or less  2) How many drinks did you have on a typical day when you were drinking in the past year? 0  3) How often did you have 6 or more drinks on one occasion in the past year? never    AUDIT-C Score: 1  Interpretation: Score 0-2 (female): Negative screen for alcohol misuse    Single Item Drug Screening:  How often have you used an illegal drug (including marijuana) or a prescription medication for non-medical reasons in the past year? never    Single Item Drug Screen Score: 0  Interpretation: Negative screen for possible drug use disorder    Social Drivers of Health     Financial Resource Strain: Low Risk  (2023)    Overall Financial Resource Strain (CARDIA)     Difficulty of Paying Living Expenses: Not hard at all   Food Insecurity: No Food Insecurity (2025)    Nursing - Inadequate Food Risk Classification     Worried About Running Out of Food in the Last Year: Never true     Ran Out of Food in the Last Year: Never true   Transportation Needs: No Transportation Needs (2025)    PRAPARE - Transportation     Lack of Transportation (Medical): No     Lack of Transportation (Non-Medical): No   Housing Stability: Low Risk  (2025)    Housing Stability Vital Sign     Unable to Pay for Housing in the Last Year: No     Number of Times Moved in the Last Year: 0     Homeless in the Last Year: No   Utilities: Not At Risk (2025)    [facility] Utilities      "Threatened with loss of utilities: No     No results found.    Objective   /78   Pulse 85   Temp 99 °F (37.2 °C) (Tympanic)   Ht 5' 2\" (1.575 m)   Wt 56.2 kg (124 lb)   SpO2 100%   BMI 22.68 kg/m²     Physical Exam  Vitals and nursing note reviewed.   Constitutional:       General: She is not in acute distress.     Appearance: Normal appearance. She is well-developed and well-groomed. She is not ill-appearing, toxic-appearing or diaphoretic.   HENT:      Head: Normocephalic and atraumatic.      Jaw: Pain on movement present. No tenderness or swelling.      Right Ear: Tympanic membrane, ear canal and external ear normal.      Left Ear: Ear canal and external ear normal.      Nose: Nose normal.      Mouth/Throat:      Lips: Pink.      Mouth: Mucous membranes are moist.      Pharynx: Oropharynx is clear.     Eyes:      General: Lids are normal.      Conjunctiva/sclera: Conjunctivae normal.      Pupils: Pupils are equal, round, and reactive to light.       Cardiovascular:      Rate and Rhythm: Normal rate and regular rhythm.      Pulses: Normal pulses.      Heart sounds: Normal heart sounds.   Pulmonary:      Effort: Pulmonary effort is normal.      Breath sounds: Normal breath sounds and air entry.   Abdominal:      General: Bowel sounds are normal.      Palpations: Abdomen is soft. There is no hepatomegaly, splenomegaly or mass.      Tenderness: There is no abdominal tenderness.      Hernia: There is no hernia in the ventral area.     Musculoskeletal:      Right lower leg: No edema.      Left lower leg: No edema.     Skin:     General: Skin is warm and dry.      Capillary Refill: Capillary refill takes less than 2 seconds.      Coloration: Skin is not pale.     Neurological:      General: No focal deficit present.      Mental Status: She is alert and oriented to person, place, and time.      Gait: Gait normal.     Psychiatric:         Mood and Affect: Mood normal.         Behavior: Behavior normal. " Behavior is cooperative.         Cognition and Memory: Cognition and memory normal.         Judgment: Judgment normal.

## 2025-07-09 ENCOUNTER — CLINICAL SUPPORT (OUTPATIENT)
Dept: RHEUMATOLOGY | Facility: CLINIC | Age: 70
End: 2025-07-09
Payer: COMMERCIAL

## 2025-07-09 VITALS
SYSTOLIC BLOOD PRESSURE: 124 MMHG | DIASTOLIC BLOOD PRESSURE: 80 MMHG | BODY MASS INDEX: 22.08 KG/M2 | HEART RATE: 71 BPM | OXYGEN SATURATION: 98 % | WEIGHT: 120 LBS | TEMPERATURE: 98.7 F | HEIGHT: 62 IN

## 2025-07-09 DIAGNOSIS — M81.0 AGE-RELATED OSTEOPOROSIS WITHOUT CURRENT PATHOLOGICAL FRACTURE: Primary | ICD-10-CM

## 2025-07-09 PROCEDURE — 96372 THER/PROPH/DIAG INJ SC/IM: CPT

## 2025-07-09 NOTE — PROGRESS NOTES
"Assessment/Plan:    Zuleyka Douglas came into the [facility] today 07/09/25 to receive Prolia injection.      Verbal consent obtained.  Consent given by: patient    patient states patient has been medically healthy with no underlining concerns/complications.      Zuleyka Douglas presents with no symptoms today.       All insturctions were reviewed with the patient.    If the patient should have any questions/concerns, advised patient to contacted [facility].       Subjective:     History provided by: patient    Patient ID: Zuleyka Douglas is a 70 y.o. female      Objective:    Vitals:    07/09/25 0831   BP: 124/80   Pulse: 71   Temp: 98.7 °F (37.1 °C)   TempSrc: Tympanic   SpO2: 98%   Weight: 54.4 kg (120 lb)   Height: 5' 2\" (1.575 m)       Patient tolerated the injection well without any complications.  Injection site/s Left arms.  Medication was provided by provider stock.      Patient signed consent form no   Patient signed ABN form no (If no patient is not a medicare patient).   Patient waited 15 minutes after injection no (This only applies to patient's receiving first time injection).       Last Visit: Visit date not found  Next visit:Visit date not found     "

## 2025-07-21 ENCOUNTER — TELEPHONE (OUTPATIENT)
Dept: RHEUMATOLOGY | Facility: CLINIC | Age: 70
End: 2025-07-21

## 2025-07-21 DIAGNOSIS — M81.0 AGE-RELATED OSTEOPOROSIS WITHOUT CURRENT PATHOLOGICAL FRACTURE: Primary | ICD-10-CM

## 2025-08-04 ENCOUNTER — PATIENT MESSAGE (OUTPATIENT)
Dept: GASTROENTEROLOGY | Facility: CLINIC | Age: 70
End: 2025-08-04

## 2025-08-07 ENCOUNTER — TELEPHONE (OUTPATIENT)
Dept: GASTROENTEROLOGY | Facility: CLINIC | Age: 70
End: 2025-08-07

## (undated) DEVICE — CHLORAPREP HI-LITE 26ML ORANGE

## (undated) DEVICE — BETHLEHEM UNIVERSAL MINOR GEN: Brand: CARDINAL HEALTH

## (undated) DEVICE — ASTOUND STANDARD SURGICAL GOWN, XL: Brand: CONVERTORS

## (undated) DEVICE — 2108 SERIES SAGITTAL BLADE (18.6 X 0.64 X 61.1MM)

## (undated) DEVICE — SUT VICRYL 0 CT-1 27 IN J260H

## (undated) DEVICE — SYRINGE 20ML LL

## (undated) DEVICE — PAD GROUNDING ADULT

## (undated) DEVICE — DRESSING MEPILEX AG BORDER 4 X 8 IN

## (undated) DEVICE — DECANTER: Brand: UNBRANDED

## (undated) DEVICE — PREMIUM DRY TRAY LF: Brand: MEDLINE INDUSTRIES, INC.

## (undated) DEVICE — UROCATCH BAG

## (undated) DEVICE — HEWSON SUTURE RETRIEVER: Brand: HEWSON SUTURE RETRIEVER

## (undated) DEVICE — GLOVE SRG BIOGEL 7.5

## (undated) DEVICE — TUBING SUCTION 5MM X 12 FT

## (undated) DEVICE — URETERAL CATHETER 5 FR CONE TIP

## (undated) DEVICE — GLOVE INDICATOR PI UNDERGLOVE SZ 8 BLUE

## (undated) DEVICE — SUT ETHIBOND 5 V-37 30 IN MB66G

## (undated) DEVICE — GLOVE SRG BIOGEL ORTHOPEDIC 8

## (undated) DEVICE — STERILE SURGICAL LUBRICANT,  TUBE: Brand: SURGILUBE

## (undated) DEVICE — 3M™ IOBAN™ 2 ANTIMICROBIAL INCISE DRAPE 6650EZ: Brand: IOBAN™ 2

## (undated) DEVICE — HOOD: Brand: FLYTE, SURGICOOL

## (undated) DEVICE — DRAPE EQUIPMENT RF WAND

## (undated) DEVICE — INVIEW CLEAR LEGGINGS: Brand: CONVERTORS

## (undated) DEVICE — SUT MONOCRYL 4-0 PS-2 18 IN Y496G

## (undated) DEVICE — ADHESIVE SKIN HIGH VISCOSITY EXOFIN 1ML

## (undated) DEVICE — FIBER STD QUANTA 365 MICRON

## (undated) DEVICE — SINGLE-USE DIGITAL FLEXIBLE URETEROSCOPE: Brand: APTRA

## (undated) DEVICE — GLOVE SRG BIOGEL 8

## (undated) DEVICE — PACK TUR

## (undated) DEVICE — INTENDED FOR TISSUE SEPARATION, AND OTHER PROCEDURES THAT REQUIRE A SHARP SURGICAL BLADE TO PUNCTURE OR CUT.: Brand: BARD-PARKER SAFETY BLADES SIZE 10, STERILE

## (undated) DEVICE — SUT VICRYL 3-0 SH 27 IN J416H

## (undated) DEVICE — BETHLEHEM TOTAL HIP, KIT: Brand: CARDINAL HEALTH

## (undated) DEVICE — GUIDEWIRE STRGHT TIP 0.035 IN  SOLO PLUS

## (undated) DEVICE — TRAY FOLEY 16FR URIMETER SURESTEP

## (undated) DEVICE — PENCIL ELECTROSURG E-Z CLEAN -0035H

## (undated) DEVICE — SUT STRATAFIX SPIRAL PDS 2-0 CT-1 45 CM SXPP1B411

## (undated) DEVICE — CAPIT HIP MOP -METAL ON POLY

## (undated) DEVICE — ENDOSCOPIC VALVE WITH ADAPTER.: Brand: SURSEAL® II

## (undated) DEVICE — ADHESIVE SKIN CLOSR DERMABOND PRINEO

## (undated) DEVICE — LIGHT HANDLE COVER SLEEVE DISP BLUE STELLAR

## (undated) DEVICE — PLUMEPEN PRO 10FT

## (undated) DEVICE — SPECIMEN CONTAINER STERILE PEEL PACK

## (undated) DEVICE — NEEDLE 22 G X 1 1/2 SAFETY

## (undated) DEVICE — THE SIMPULSE SOLO SYSTEM WITH ULTREX RETRACTABLE SPLASH SHIELD TIP: Brand: SIMPULSE SOLO

## (undated) DEVICE — ELECTRODE BLADE MOD  E-Z CLEAN 6.5IN -0014M

## (undated) DEVICE — CATH URETERAL 5FR X 70 CM FLEX TIP POLYUR BARD

## (undated) DEVICE — INTENDED FOR TISSUE SEPARATION, AND OTHER PROCEDURES THAT REQUIRE A SHARP SURGICAL BLADE TO PUNCTURE OR CUT.: Brand: BARD-PARKER SAFETY BLADES SIZE 15, STERILE

## (undated) DEVICE — IMPERVIOUS STOCKINETTE: Brand: DEROYAL

## (undated) DEVICE — SPONGE PVP SCRUB WING STERILE

## (undated) DEVICE — SUT STRATAFIX SPIRAL PDS PLUS 1 CTX 18 IN SXPP1A400